# Patient Record
Sex: FEMALE | Race: WHITE | NOT HISPANIC OR LATINO | URBAN - METROPOLITAN AREA
[De-identification: names, ages, dates, MRNs, and addresses within clinical notes are randomized per-mention and may not be internally consistent; named-entity substitution may affect disease eponyms.]

---

## 2017-05-05 ENCOUNTER — EMERGENCY (EMERGENCY)
Facility: HOSPITAL | Age: 72
LOS: 1 days | Discharge: HOME | End: 2017-05-05
Attending: INTERNAL MEDICINE | Admitting: INTERNAL MEDICINE
Payer: MEDICARE

## 2017-05-05 VITALS
SYSTOLIC BLOOD PRESSURE: 161 MMHG | DIASTOLIC BLOOD PRESSURE: 93 MMHG | HEART RATE: 95 BPM | OXYGEN SATURATION: 96 % | WEIGHT: 255.96 LBS | HEIGHT: 64 IN | TEMPERATURE: 98 F

## 2017-05-05 VITALS
HEART RATE: 88 BPM | SYSTOLIC BLOOD PRESSURE: 154 MMHG | OXYGEN SATURATION: 96 % | DIASTOLIC BLOOD PRESSURE: 86 MMHG | RESPIRATION RATE: 16 BRPM

## 2017-05-05 DIAGNOSIS — Z90.49 ACQUIRED ABSENCE OF OTHER SPECIFIED PARTS OF DIGESTIVE TRACT: Chronic | ICD-10-CM

## 2017-05-05 DIAGNOSIS — Z90.710 ACQUIRED ABSENCE OF BOTH CERVIX AND UTERUS: Chronic | ICD-10-CM

## 2017-05-05 PROCEDURE — 99283 EMERGENCY DEPT VISIT LOW MDM: CPT | Mod: 25

## 2017-05-05 PROCEDURE — 73650 X-RAY EXAM OF HEEL: CPT

## 2017-05-05 PROCEDURE — 73650 X-RAY EXAM OF HEEL: CPT | Mod: 26,LT

## 2017-05-05 PROCEDURE — 99283 EMERGENCY DEPT VISIT LOW MDM: CPT

## 2017-05-05 RX ORDER — CEFOXITIN 1 G/1
1 INJECTION, POWDER, FOR SOLUTION INTRAVENOUS
Qty: 20 | Refills: 0
Start: 2017-05-05 | End: 2017-05-15

## 2017-05-05 NOTE — ED PROVIDER NOTE - MEDICAL DECISION MAKING DETAILS
erythema,tenderness,mild swelling( 4x2 inch area) overlying achilles insertion site(achilles not overtly involved)..will rx for cellulitis..no need presently to admit for iv ab

## 2017-05-05 NOTE — ED PROVIDER NOTE - OBJECTIVE STATEMENT
told to go to er for iv dose of ab."cellulitis"( overlying left achilles tendon insertion site)began 4 days ago.had fever and pain with ambulation.no chills,non diabetic.pain is actually better and no fever since onset of problem.

## 2017-05-05 NOTE — ED ADULT NURSE NOTE - OBJECTIVE STATEMENT
Noted mild swelling to lt lat ankle on Tues. Developed redness & spread to heel .. Advised by private MD to go to ED for antibiotics.

## 2017-11-21 ENCOUNTER — APPOINTMENT (OUTPATIENT)
Dept: INTERVENTIONAL RADIOLOGY/VASCULAR | Facility: CLINIC | Age: 72
End: 2017-11-21
Payer: MEDICARE

## 2017-11-21 VITALS
SYSTOLIC BLOOD PRESSURE: 116 MMHG | WEIGHT: 242 LBS | DIASTOLIC BLOOD PRESSURE: 74 MMHG | HEART RATE: 109 BPM | TEMPERATURE: 98 F | HEIGHT: 64.5 IN | BODY MASS INDEX: 40.81 KG/M2 | OXYGEN SATURATION: 96 %

## 2017-11-21 DIAGNOSIS — G89.29 LUMBAGO WITH SCIATICA, LEFT SIDE: ICD-10-CM

## 2017-11-21 DIAGNOSIS — Z60.2 PROBLEMS RELATED TO LIVING ALONE: ICD-10-CM

## 2017-11-21 DIAGNOSIS — M54.42 LUMBAGO WITH SCIATICA, LEFT SIDE: ICD-10-CM

## 2017-11-21 DIAGNOSIS — Z80.8 FAMILY HISTORY OF MALIGNANT NEOPLASM OF OTHER ORGANS OR SYSTEMS: ICD-10-CM

## 2017-11-21 DIAGNOSIS — Z87.39 PERSONAL HISTORY OF OTHER DISEASES OF THE MUSCULOSKELETAL SYSTEM AND CONNECTIVE TISSUE: ICD-10-CM

## 2017-11-21 DIAGNOSIS — Z78.9 OTHER SPECIFIED HEALTH STATUS: ICD-10-CM

## 2017-11-21 DIAGNOSIS — Z63.4 DISAPPEARANCE AND DEATH OF FAMILY MEMBER: ICD-10-CM

## 2017-11-21 DIAGNOSIS — Z85.41 PERSONAL HISTORY OF MALIGNANT NEOPLASM OF CERVIX UTERI: ICD-10-CM

## 2017-11-21 DIAGNOSIS — R93.5 ABNORMAL FINDINGS ON DIAGNOSTIC IMAGING OF OTHER ABDOMINAL REGIONS, INCLUDING RETROPERITONEUM: ICD-10-CM

## 2017-11-21 DIAGNOSIS — F15.90 OTHER STIMULANT USE, UNSPECIFIED, UNCOMPLICATED: ICD-10-CM

## 2017-11-21 DIAGNOSIS — R31.29 OTHER MICROSCOPIC HEMATURIA: ICD-10-CM

## 2017-11-21 PROCEDURE — 99204 OFFICE O/P NEW MOD 45 MIN: CPT

## 2017-11-21 SDOH — SOCIAL STABILITY - SOCIAL INSECURITY: DISSAPEARANCE AND DEATH OF FAMILY MEMBER: Z63.4

## 2017-11-21 SDOH — SOCIAL STABILITY - SOCIAL INSECURITY: PROBLEMS RELATED TO LIVING ALONE: Z60.2

## 2017-11-30 ENCOUNTER — OUTPATIENT (OUTPATIENT)
Dept: OUTPATIENT SERVICES | Facility: HOSPITAL | Age: 72
LOS: 1 days | End: 2017-11-30
Payer: MEDICARE

## 2017-11-30 VITALS
OXYGEN SATURATION: 95 % | HEART RATE: 90 BPM | RESPIRATION RATE: 16 BRPM | WEIGHT: 240.97 LBS | SYSTOLIC BLOOD PRESSURE: 137 MMHG | TEMPERATURE: 98 F | DIASTOLIC BLOOD PRESSURE: 80 MMHG | HEIGHT: 64.5 IN

## 2017-11-30 DIAGNOSIS — Z90.49 ACQUIRED ABSENCE OF OTHER SPECIFIED PARTS OF DIGESTIVE TRACT: Chronic | ICD-10-CM

## 2017-11-30 DIAGNOSIS — Z01.818 ENCOUNTER FOR OTHER PREPROCEDURAL EXAMINATION: ICD-10-CM

## 2017-11-30 DIAGNOSIS — N20.0 CALCULUS OF KIDNEY: ICD-10-CM

## 2017-11-30 DIAGNOSIS — Z98.890 OTHER SPECIFIED POSTPROCEDURAL STATES: Chronic | ICD-10-CM

## 2017-11-30 DIAGNOSIS — N81.4 UTEROVAGINAL PROLAPSE, UNSPECIFIED: Chronic | ICD-10-CM

## 2017-11-30 DIAGNOSIS — G47.33 OBSTRUCTIVE SLEEP APNEA (ADULT) (PEDIATRIC): ICD-10-CM

## 2017-11-30 DIAGNOSIS — I10 ESSENTIAL (PRIMARY) HYPERTENSION: ICD-10-CM

## 2017-11-30 DIAGNOSIS — Z90.710 ACQUIRED ABSENCE OF BOTH CERVIX AND UTERUS: Chronic | ICD-10-CM

## 2017-11-30 DIAGNOSIS — Z98.49 CATARACT EXTRACTION STATUS, UNSPECIFIED EYE: Chronic | ICD-10-CM

## 2017-11-30 LAB
ANION GAP SERPL CALC-SCNC: 12 MMOL/L — SIGNIFICANT CHANGE UP (ref 5–17)
BLD GP AB SCN SERPL QL: NEGATIVE — SIGNIFICANT CHANGE UP
BUN SERPL-MCNC: 16 MG/DL — SIGNIFICANT CHANGE UP (ref 7–23)
CALCIUM SERPL-MCNC: 10.1 MG/DL — SIGNIFICANT CHANGE UP (ref 8.4–10.5)
CHLORIDE SERPL-SCNC: 101 MMOL/L — SIGNIFICANT CHANGE UP (ref 96–108)
CO2 SERPL-SCNC: 27 MMOL/L — SIGNIFICANT CHANGE UP (ref 22–31)
CREAT SERPL-MCNC: 0.86 MG/DL — SIGNIFICANT CHANGE UP (ref 0.5–1.3)
GLUCOSE SERPL-MCNC: 88 MG/DL — SIGNIFICANT CHANGE UP (ref 70–99)
HCT VFR BLD CALC: 41.9 % — SIGNIFICANT CHANGE UP (ref 34.5–45)
HGB BLD-MCNC: 13.7 G/DL — SIGNIFICANT CHANGE UP (ref 11.5–15.5)
MCHC RBC-ENTMCNC: 31.6 PG — SIGNIFICANT CHANGE UP (ref 27–34)
MCHC RBC-ENTMCNC: 32.7 GM/DL — SIGNIFICANT CHANGE UP (ref 32–36)
MCV RBC AUTO: 96.8 FL — SIGNIFICANT CHANGE UP (ref 80–100)
PLATELET # BLD AUTO: 343 K/UL — SIGNIFICANT CHANGE UP (ref 150–400)
POTASSIUM SERPL-MCNC: 4.5 MMOL/L — SIGNIFICANT CHANGE UP (ref 3.5–5.3)
POTASSIUM SERPL-SCNC: 4.5 MMOL/L — SIGNIFICANT CHANGE UP (ref 3.5–5.3)
RBC # BLD: 4.33 M/UL — SIGNIFICANT CHANGE UP (ref 3.8–5.2)
RBC # FLD: 13.8 % — SIGNIFICANT CHANGE UP (ref 10.3–14.5)
RH IG SCN BLD-IMP: POSITIVE — SIGNIFICANT CHANGE UP
SODIUM SERPL-SCNC: 140 MMOL/L — SIGNIFICANT CHANGE UP (ref 135–145)
WBC # BLD: 7.88 K/UL — SIGNIFICANT CHANGE UP (ref 3.8–10.5)
WBC # FLD AUTO: 7.88 K/UL — SIGNIFICANT CHANGE UP (ref 3.8–10.5)

## 2017-11-30 PROCEDURE — 93005 ELECTROCARDIOGRAM TRACING: CPT

## 2017-11-30 PROCEDURE — 93010 ELECTROCARDIOGRAM REPORT: CPT

## 2017-11-30 PROCEDURE — 86900 BLOOD TYPING SEROLOGIC ABO: CPT

## 2017-11-30 PROCEDURE — 87086 URINE CULTURE/COLONY COUNT: CPT

## 2017-11-30 PROCEDURE — 86901 BLOOD TYPING SEROLOGIC RH(D): CPT

## 2017-11-30 PROCEDURE — G0463: CPT

## 2017-11-30 PROCEDURE — 85027 COMPLETE CBC AUTOMATED: CPT

## 2017-11-30 PROCEDURE — 86850 RBC ANTIBODY SCREEN: CPT

## 2017-11-30 PROCEDURE — 80048 BASIC METABOLIC PNL TOTAL CA: CPT

## 2017-11-30 RX ORDER — VALSARTAN 80 MG/1
1 TABLET ORAL
Qty: 0 | Refills: 0 | COMMUNITY

## 2017-11-30 RX ORDER — SODIUM CHLORIDE 9 MG/ML
3 INJECTION INTRAMUSCULAR; INTRAVENOUS; SUBCUTANEOUS EVERY 8 HOURS
Qty: 0 | Refills: 0 | Status: DISCONTINUED | OUTPATIENT
Start: 2017-12-07 | End: 2017-12-10

## 2017-11-30 NOTE — H&P PST ADULT - HISTORY OF PRESENT ILLNESS
73 yo female, PMH HTN, 71 yo female, PMH HTN, smoker 1/2 PPD X 55 years, emphysema, morbid obesity BMI 41, cervical CA s/p ROBERTO 1978, chronic low back pain since 2008 after a fall and lumbar spinal stenosis, bilateral renal stones s/p lithotripsy X 3. Pt. reports having microscopic blood in urine and CT scan revealed bilateral kidney stones, large stone on right and presents to PST today for Right Percutaneous Cystoscopy, Nephrolithotomy on 12/7/17. Pt. with NAVARRO which is chronic due to weight and emphysema, denies recent fever, chills, chest pain, changes in bowel/urinary habits.

## 2017-11-30 NOTE — H&P PST ADULT - PSH
H/O abdominal hysterectomy  ROBERTO 1978  H/O lithotripsy  ESWL 1986 left, 2004 right, 2007 left  History of appendectomy  1954  Prolapse, uterovaginal  s/p TVT Sling 2000  S/P breast lumpectomy  left, benign  S/P cataract surgery  bilateral, 2015  S/P cholecystectomy  2005  S/P ERCP  2006

## 2017-11-30 NOTE — H&P PST ADULT - PROBLEM SELECTOR PLAN 1
Right Right Percutaneous cystoscopy, Nephrolithotomy  Pre-op education, including Chlorhexidine soap, provided - all questions answered

## 2017-11-30 NOTE — H&P PST ADULT - PMH
Emphysema lung  mild, on CT scan 2017  HTN (hypertension)    Malignant neoplasm of cervix, unspecified site  1978  Obesity    Proteinuria    Renal calculus, bilateral    Smoker  1/2 pack X 55 years Emphysema lung  mild, on CT scan 2017  HTN (hypertension)    Malignant neoplasm of cervix, unspecified site  1978  Morbid obesity with BMI of 40.0-44.9, adult    Proteinuria    Renal calculus, bilateral    Smoker  1/2 pack X 55 years Emphysema lung  mild, on CT scan 2017  HTN (hypertension)  diagnosed 10/2017  Lumbar herniated disc    Malignant neoplasm of cervix, unspecified site  s/p ROBERTO 1978  Morbid obesity with BMI of 40.0-44.9, adult    TYLOR (obstructive sleep apnea)  as per [facility] criteria  Proteinuria    Renal calculus, bilateral    Smoker  1/2 pack X 55 years  Spinal stenosis of lumbar region

## 2017-11-30 NOTE — H&P PST ADULT - GENERAL GENITOURINARY SYMPTOMS
increased urinary frequency/hematuria/nocturia nocturia/microscopic hematuria/increased urinary frequency

## 2017-11-30 NOTE — H&P PST ADULT - NSANTHOSAYNRD_GEN_A_CORE
No. TYLOR screening performed.  STOP BANG Legend: 0-2 = LOW Risk; 3-4 = INTERMEDIATE Risk; 5-8 = HIGH Risk

## 2017-12-01 LAB
CULTURE RESULTS: SIGNIFICANT CHANGE UP
SPECIMEN SOURCE: SIGNIFICANT CHANGE UP

## 2017-12-07 ENCOUNTER — TRANSCRIPTION ENCOUNTER (OUTPATIENT)
Age: 72
End: 2017-12-07

## 2017-12-07 ENCOUNTER — INPATIENT (INPATIENT)
Facility: HOSPITAL | Age: 72
LOS: 2 days | Discharge: HOME | DRG: 660 | End: 2017-12-10
Attending: UROLOGY | Admitting: UROLOGY
Payer: MEDICARE

## 2017-12-07 ENCOUNTER — RESULT REVIEW (OUTPATIENT)
Age: 72
End: 2017-12-07

## 2017-12-07 VITALS
SYSTOLIC BLOOD PRESSURE: 104 MMHG | HEART RATE: 60 BPM | DIASTOLIC BLOOD PRESSURE: 53 MMHG | RESPIRATION RATE: 14 BRPM | TEMPERATURE: 97 F | OXYGEN SATURATION: 94 %

## 2017-12-07 DIAGNOSIS — Z98.890 OTHER SPECIFIED POSTPROCEDURAL STATES: Chronic | ICD-10-CM

## 2017-12-07 DIAGNOSIS — Z98.49 CATARACT EXTRACTION STATUS, UNSPECIFIED EYE: Chronic | ICD-10-CM

## 2017-12-07 DIAGNOSIS — N23 UNSPECIFIED RENAL COLIC: ICD-10-CM

## 2017-12-07 DIAGNOSIS — Z90.49 ACQUIRED ABSENCE OF OTHER SPECIFIED PARTS OF DIGESTIVE TRACT: Chronic | ICD-10-CM

## 2017-12-07 DIAGNOSIS — N20.0 CALCULUS OF KIDNEY: ICD-10-CM

## 2017-12-07 DIAGNOSIS — Z90.710 ACQUIRED ABSENCE OF BOTH CERVIX AND UTERUS: Chronic | ICD-10-CM

## 2017-12-07 DIAGNOSIS — N81.4 UTEROVAGINAL PROLAPSE, UNSPECIFIED: Chronic | ICD-10-CM

## 2017-12-07 LAB
ANION GAP SERPL CALC-SCNC: 12 MMOL/L — SIGNIFICANT CHANGE UP (ref 5–17)
APTT BLD: 31.6 SEC — SIGNIFICANT CHANGE UP (ref 27.5–37.4)
BASOPHILS # BLD AUTO: 0 K/UL — SIGNIFICANT CHANGE UP (ref 0–0.2)
BASOPHILS NFR BLD AUTO: 0.1 % — SIGNIFICANT CHANGE UP (ref 0–2)
BUN SERPL-MCNC: 20 MG/DL — SIGNIFICANT CHANGE UP (ref 7–23)
CALCIUM SERPL-MCNC: 7.8 MG/DL — LOW (ref 8.4–10.5)
CHLORIDE SERPL-SCNC: 104 MMOL/L — SIGNIFICANT CHANGE UP (ref 96–108)
CO2 SERPL-SCNC: 23 MMOL/L — SIGNIFICANT CHANGE UP (ref 22–31)
CREAT SERPL-MCNC: 0.86 MG/DL — SIGNIFICANT CHANGE UP (ref 0.5–1.3)
EOSINOPHIL # BLD AUTO: 0.1 K/UL — SIGNIFICANT CHANGE UP (ref 0–0.5)
EOSINOPHIL NFR BLD AUTO: 0.5 % — SIGNIFICANT CHANGE UP (ref 0–6)
GLUCOSE SERPL-MCNC: 171 MG/DL — HIGH (ref 70–99)
HCT VFR BLD CALC: 27 % — LOW (ref 34.5–45)
HCT VFR BLD CALC: 30.6 % — LOW (ref 34.5–45)
HGB BLD-MCNC: 10.7 G/DL — LOW (ref 11.5–15.5)
HGB BLD-MCNC: 9.7 G/DL — LOW (ref 11.5–15.5)
INR BLD: 1.01 RATIO — SIGNIFICANT CHANGE UP (ref 0.88–1.16)
LYMPHOCYTES # BLD AUTO: 16 % — SIGNIFICANT CHANGE UP (ref 13–44)
LYMPHOCYTES # BLD AUTO: 2.3 K/UL — SIGNIFICANT CHANGE UP (ref 1–3.3)
MCHC RBC-ENTMCNC: 34.6 PG — HIGH (ref 27–34)
MCHC RBC-ENTMCNC: 35 GM/DL — SIGNIFICANT CHANGE UP (ref 32–36)
MCHC RBC-ENTMCNC: 35.7 PG — HIGH (ref 27–34)
MCHC RBC-ENTMCNC: 35.9 GM/DL — SIGNIFICANT CHANGE UP (ref 32–36)
MCV RBC AUTO: 99 FL — SIGNIFICANT CHANGE UP (ref 80–100)
MCV RBC AUTO: 99.4 FL — SIGNIFICANT CHANGE UP (ref 80–100)
MONOCYTES # BLD AUTO: 0.5 K/UL — SIGNIFICANT CHANGE UP (ref 0–0.9)
MONOCYTES NFR BLD AUTO: 3.2 % — SIGNIFICANT CHANGE UP (ref 2–14)
NEUTROPHILS # BLD AUTO: 11.5 K/UL — HIGH (ref 1.8–7.4)
NEUTROPHILS NFR BLD AUTO: 80.2 % — HIGH (ref 43–77)
PLATELET # BLD AUTO: 221 K/UL — SIGNIFICANT CHANGE UP (ref 150–400)
PLATELET # BLD AUTO: 281 K/UL — SIGNIFICANT CHANGE UP (ref 150–400)
POTASSIUM SERPL-MCNC: 4.1 MMOL/L — SIGNIFICANT CHANGE UP (ref 3.5–5.3)
POTASSIUM SERPL-SCNC: 4.1 MMOL/L — SIGNIFICANT CHANGE UP (ref 3.5–5.3)
PROTHROM AB SERPL-ACNC: 10.9 SEC — SIGNIFICANT CHANGE UP (ref 9.8–12.7)
RBC # BLD: 2.72 M/UL — LOW (ref 3.8–5.2)
RBC # BLD: 3.09 M/UL — LOW (ref 3.8–5.2)
RBC # FLD: 12.3 % — SIGNIFICANT CHANGE UP (ref 10.3–14.5)
RBC # FLD: 12.4 % — SIGNIFICANT CHANGE UP (ref 10.3–14.5)
SODIUM SERPL-SCNC: 139 MMOL/L — SIGNIFICANT CHANGE UP (ref 135–145)
WBC # BLD: 11.3 K/UL — HIGH (ref 3.8–10.5)
WBC # BLD: 14.3 K/UL — HIGH (ref 3.8–10.5)
WBC # FLD AUTO: 11.3 K/UL — HIGH (ref 3.8–10.5)
WBC # FLD AUTO: 14.3 K/UL — HIGH (ref 3.8–10.5)

## 2017-12-07 PROCEDURE — 88300 SURGICAL PATH GROSS: CPT | Mod: 26

## 2017-12-07 PROCEDURE — 50395: CPT | Mod: RT

## 2017-12-07 RX ORDER — CEFAZOLIN SODIUM 1 G
2000 VIAL (EA) INJECTION ONCE
Qty: 0 | Refills: 0 | Status: COMPLETED | OUTPATIENT
Start: 2017-12-07 | End: 2017-12-07

## 2017-12-07 RX ORDER — SENNA PLUS 8.6 MG/1
2 TABLET ORAL AT BEDTIME
Qty: 0 | Refills: 0 | Status: DISCONTINUED | OUTPATIENT
Start: 2017-12-07 | End: 2017-12-07

## 2017-12-07 RX ORDER — VALSARTAN 80 MG/1
320 TABLET ORAL DAILY
Qty: 0 | Refills: 0 | Status: DISCONTINUED | OUTPATIENT
Start: 2017-12-07 | End: 2017-12-10

## 2017-12-07 RX ORDER — ONDANSETRON 8 MG/1
4 TABLET, FILM COATED ORAL ONCE
Qty: 0 | Refills: 0 | Status: COMPLETED | OUTPATIENT
Start: 2017-12-07 | End: 2017-12-08

## 2017-12-07 RX ORDER — HEPARIN SODIUM 5000 [USP'U]/ML
5000 INJECTION INTRAVENOUS; SUBCUTANEOUS EVERY 8 HOURS
Qty: 0 | Refills: 0 | Status: DISCONTINUED | OUTPATIENT
Start: 2017-12-07 | End: 2017-12-10

## 2017-12-07 RX ORDER — OXYCODONE AND ACETAMINOPHEN 5; 325 MG/1; MG/1
2 TABLET ORAL EVERY 4 HOURS
Qty: 0 | Refills: 0 | Status: DISCONTINUED | OUTPATIENT
Start: 2017-12-07 | End: 2017-12-10

## 2017-12-07 RX ORDER — LIDOCAINE HCL 20 MG/ML
0.2 VIAL (ML) INJECTION ONCE
Qty: 0 | Refills: 0 | Status: COMPLETED | OUTPATIENT
Start: 2017-12-07 | End: 2017-12-07

## 2017-12-07 RX ORDER — HYDROMORPHONE HYDROCHLORIDE 2 MG/ML
0.25 INJECTION INTRAMUSCULAR; INTRAVENOUS; SUBCUTANEOUS ONCE
Qty: 0 | Refills: 0 | Status: DISCONTINUED | OUTPATIENT
Start: 2017-12-07 | End: 2017-12-07

## 2017-12-07 RX ORDER — CEFAZOLIN SODIUM 1 G
2000 VIAL (EA) INJECTION EVERY 8 HOURS
Qty: 0 | Refills: 0 | Status: COMPLETED | OUTPATIENT
Start: 2017-12-07 | End: 2017-12-08

## 2017-12-07 RX ORDER — HYDROMORPHONE HYDROCHLORIDE 2 MG/ML
0.25 INJECTION INTRAMUSCULAR; INTRAVENOUS; SUBCUTANEOUS
Qty: 0 | Refills: 0 | Status: DISCONTINUED | OUTPATIENT
Start: 2017-12-07 | End: 2017-12-08

## 2017-12-07 RX ORDER — OXYCODONE AND ACETAMINOPHEN 5; 325 MG/1; MG/1
1 TABLET ORAL EVERY 4 HOURS
Qty: 0 | Refills: 0 | Status: DISCONTINUED | OUTPATIENT
Start: 2017-12-07 | End: 2017-12-10

## 2017-12-07 RX ORDER — SODIUM CHLORIDE 9 MG/ML
1000 INJECTION, SOLUTION INTRAVENOUS
Qty: 0 | Refills: 0 | Status: DISCONTINUED | OUTPATIENT
Start: 2017-12-07 | End: 2017-12-08

## 2017-12-07 RX ADMIN — Medication 100 MILLIGRAM(S): at 22:52

## 2017-12-07 RX ADMIN — HYDROMORPHONE HYDROCHLORIDE 0.25 MILLIGRAM(S): 2 INJECTION INTRAMUSCULAR; INTRAVENOUS; SUBCUTANEOUS at 20:05

## 2017-12-07 RX ADMIN — HYDROMORPHONE HYDROCHLORIDE 0.25 MILLIGRAM(S): 2 INJECTION INTRAMUSCULAR; INTRAVENOUS; SUBCUTANEOUS at 20:30

## 2017-12-07 RX ADMIN — SODIUM CHLORIDE 3 MILLILITER(S): 9 INJECTION INTRAMUSCULAR; INTRAVENOUS; SUBCUTANEOUS at 22:22

## 2017-12-07 RX ADMIN — HYDROMORPHONE HYDROCHLORIDE 0.25 MILLIGRAM(S): 2 INJECTION INTRAMUSCULAR; INTRAVENOUS; SUBCUTANEOUS at 13:00

## 2017-12-07 RX ADMIN — SODIUM CHLORIDE 150 MILLILITER(S): 9 INJECTION, SOLUTION INTRAVENOUS at 19:44

## 2017-12-07 RX ADMIN — HYDROMORPHONE HYDROCHLORIDE 0.25 MILLIGRAM(S): 2 INJECTION INTRAMUSCULAR; INTRAVENOUS; SUBCUTANEOUS at 12:45

## 2017-12-07 RX ADMIN — HYDROMORPHONE HYDROCHLORIDE 0.25 MILLIGRAM(S): 2 INJECTION INTRAMUSCULAR; INTRAVENOUS; SUBCUTANEOUS at 19:42

## 2017-12-07 RX ADMIN — HEPARIN SODIUM 5000 UNIT(S): 5000 INJECTION INTRAVENOUS; SUBCUTANEOUS at 22:53

## 2017-12-07 RX ADMIN — HYDROMORPHONE HYDROCHLORIDE 0.25 MILLIGRAM(S): 2 INJECTION INTRAMUSCULAR; INTRAVENOUS; SUBCUTANEOUS at 20:45

## 2017-12-07 NOTE — BRIEF OPERATIVE NOTE - PROCEDURE
<<-----Click on this checkbox to enter Procedure Percutaneous nephrolithotomy  12/07/2017    Active  CTABIB

## 2017-12-07 NOTE — PROGRESS NOTE ADULT - SUBJECTIVE AND OBJECTIVE BOX
Post op Check    Pt seen and examined without complaints. Pain is controlled. Denies SOB/CP/N/V.     Vital Signs Last 24 Hrs  T(C): 36.2 (07 Dec 2017 19:30), Max: 36.2 (07 Dec 2017 19:30)  T(F): 97.2 (07 Dec 2017 19:30), Max: 97.2 (07 Dec 2017 19:30)  HR: 78 (07 Dec 2017 20:30) (54 - 89)  BP: 114/62 (07 Dec 2017 20:30) (81/50 - 119/58)  BP(mean): 80 (07 Dec 2017 20:30) (57 - 83)  RR: 17 (07 Dec 2017 20:30) (14 - 17)  SpO2: 100% (07 Dec 2017 20:30) (94% - 100%)    I&O's Summary    07 Dec 2017 07:01  -  07 Dec 2017 20:58  --------------------------------------------------------  IN: 300 mL / OUT: 210 mL / NET: 90 mL    Physical Exam  Gen: NAD, A&Ox3  Pulm: No respiratory distress, no subcostal retractions  CV: RRR, no JVD  Abd: obese, Soft, NT, ND  Back: right flank dressing c/d/i   r nephrostomy tube draining clear red urine  : donnelly draining pink urine                           9.7    11.3  )-----------( 221      ( 07 Dec 2017 20:14 )             27.0       12-07    139  |  104  |  20  ----------------------------<  171<H>  4.1   |  23  |  0.86    Ca    7.8<L>      07 Dec 2017 18:53        A/P: 72y Female s/p right PCNL   DVT prophylaxis/OOB  Incentive spirometry  Strict I&O's and monitor vitals closely   Analgesia and antiemetics as needed  Diet-regular   Pacu labs and AM labs  Post op Check    Pt seen and examined without complaints. Pain is controlled. Denies SOB/CP/N/V.     Vital Signs Last 24 Hrs  T(C): 36.2 (07 Dec 2017 19:30), Max: 36.2 (07 Dec 2017 19:30)  T(F): 97.2 (07 Dec 2017 19:30), Max: 97.2 (07 Dec 2017 19:30)  HR: 78 (07 Dec 2017 20:30) (54 - 89)  BP: 114/62 (07 Dec 2017 20:30) (81/50 - 119/58)  BP(mean): 80 (07 Dec 2017 20:30) (57 - 83)  RR: 17 (07 Dec 2017 20:30) (14 - 17)  SpO2: 100% (07 Dec 2017 20:30) (94% - 100%)    I&O's Summary    07 Dec 2017 07:01  -  07 Dec 2017 20:58  --------------------------------------------------------  IN: 300 mL / OUT: 210 mL / NET: 90 mL    Physical Exam  Gen: NAD, A&Ox3  Pulm: No respiratory distress, no subcostal retractions  CV: RRR, no JVD  Abd: obese, Soft, NT, ND  Back: right flank dressing c/d/i   r nephrostomy tube draining clear red urine  : donnelly draining pink urine                           9.7    11.3  )-----------( 221      ( 07 Dec 2017 20:14 )             27.0       12-07    139  |  104  |  20  ----------------------------<  171<H>  4.1   |  23  |  0.86    Ca    7.8<L>      07 Dec 2017 18:53        A/P: 72y Female s/p right PCNL   DVT prophylaxis/OOB  Incentive spirometry  Strict I&O's and monitor vitals closely   Analgesia and antiemetics as needed  Diet-regular   Pacu labs and AM labs  Nephrostogram tomorrow

## 2017-12-08 DIAGNOSIS — N20.0 CALCULUS OF KIDNEY: ICD-10-CM

## 2017-12-08 LAB
ANION GAP SERPL CALC-SCNC: 8 MMOL/L — SIGNIFICANT CHANGE UP (ref 5–17)
BASOPHILS # BLD AUTO: 0 K/UL — SIGNIFICANT CHANGE UP (ref 0–0.2)
BASOPHILS NFR BLD AUTO: 0.2 % — SIGNIFICANT CHANGE UP (ref 0–2)
BUN SERPL-MCNC: 19 MG/DL — SIGNIFICANT CHANGE UP (ref 7–23)
CALCIUM SERPL-MCNC: 7.7 MG/DL — LOW (ref 8.4–10.5)
CHLORIDE SERPL-SCNC: 103 MMOL/L — SIGNIFICANT CHANGE UP (ref 96–108)
CO2 SERPL-SCNC: 27 MMOL/L — SIGNIFICANT CHANGE UP (ref 22–31)
CREAT SERPL-MCNC: 0.86 MG/DL — SIGNIFICANT CHANGE UP (ref 0.5–1.3)
EOSINOPHIL # BLD AUTO: 0 K/UL — SIGNIFICANT CHANGE UP (ref 0–0.5)
EOSINOPHIL NFR BLD AUTO: 0 % — SIGNIFICANT CHANGE UP (ref 0–6)
GLUCOSE SERPL-MCNC: 132 MG/DL — HIGH (ref 70–99)
HCT VFR BLD CALC: 28.3 % — LOW (ref 34.5–45)
HCT VFR BLD CALC: 30.4 % — LOW (ref 34.5–45)
HGB BLD-MCNC: 10.3 G/DL — LOW (ref 11.5–15.5)
HGB BLD-MCNC: 9.8 G/DL — LOW (ref 11.5–15.5)
LYMPHOCYTES # BLD AUTO: 0.8 K/UL — LOW (ref 1–3.3)
LYMPHOCYTES # BLD AUTO: 7.9 % — LOW (ref 13–44)
MCHC RBC-ENTMCNC: 33.6 PG — SIGNIFICANT CHANGE UP (ref 27–34)
MCHC RBC-ENTMCNC: 33.8 GM/DL — SIGNIFICANT CHANGE UP (ref 32–36)
MCHC RBC-ENTMCNC: 34.6 PG — HIGH (ref 27–34)
MCHC RBC-ENTMCNC: 34.8 GM/DL — SIGNIFICANT CHANGE UP (ref 32–36)
MCV RBC AUTO: 99.4 FL — SIGNIFICANT CHANGE UP (ref 80–100)
MCV RBC AUTO: 99.5 FL — SIGNIFICANT CHANGE UP (ref 80–100)
MONOCYTES # BLD AUTO: 0.5 K/UL — SIGNIFICANT CHANGE UP (ref 0–0.9)
MONOCYTES NFR BLD AUTO: 4.4 % — SIGNIFICANT CHANGE UP (ref 2–14)
NEUTROPHILS # BLD AUTO: 8.9 K/UL — HIGH (ref 1.8–7.4)
NEUTROPHILS NFR BLD AUTO: 87.5 % — HIGH (ref 43–77)
PLATELET # BLD AUTO: 267 K/UL — SIGNIFICANT CHANGE UP (ref 150–400)
PLATELET # BLD AUTO: 269 K/UL — SIGNIFICANT CHANGE UP (ref 150–400)
POTASSIUM SERPL-MCNC: 4.9 MMOL/L — SIGNIFICANT CHANGE UP (ref 3.5–5.3)
POTASSIUM SERPL-SCNC: 4.9 MMOL/L — SIGNIFICANT CHANGE UP (ref 3.5–5.3)
RBC # BLD: 2.85 M/UL — LOW (ref 3.8–5.2)
RBC # BLD: 3.05 M/UL — LOW (ref 3.8–5.2)
RBC # FLD: 12.5 % — SIGNIFICANT CHANGE UP (ref 10.3–14.5)
RBC # FLD: 12.5 % — SIGNIFICANT CHANGE UP (ref 10.3–14.5)
SODIUM SERPL-SCNC: 138 MMOL/L — SIGNIFICANT CHANGE UP (ref 135–145)
WBC # BLD: 10.1 K/UL — SIGNIFICANT CHANGE UP (ref 3.8–10.5)
WBC # BLD: 10.8 K/UL — HIGH (ref 3.8–10.5)
WBC # FLD AUTO: 10.1 K/UL — SIGNIFICANT CHANGE UP (ref 3.8–10.5)
WBC # FLD AUTO: 10.8 K/UL — HIGH (ref 3.8–10.5)

## 2017-12-08 PROCEDURE — 50431 NJX PX NFROSGRM &/URTRGRM: CPT | Mod: RT

## 2017-12-08 RX ORDER — ACETAMINOPHEN 500 MG
1000 TABLET ORAL ONCE
Qty: 0 | Refills: 0 | Status: COMPLETED | OUTPATIENT
Start: 2017-12-08 | End: 2017-12-08

## 2017-12-08 RX ORDER — INFLUENZA VIRUS VACCINE 15; 15; 15; 15 UG/.5ML; UG/.5ML; UG/.5ML; UG/.5ML
0.5 SUSPENSION INTRAMUSCULAR ONCE
Qty: 0 | Refills: 0 | Status: DISCONTINUED | OUTPATIENT
Start: 2017-12-08 | End: 2017-12-10

## 2017-12-08 RX ADMIN — HEPARIN SODIUM 5000 UNIT(S): 5000 INJECTION INTRAVENOUS; SUBCUTANEOUS at 21:19

## 2017-12-08 RX ADMIN — Medication 1000 MILLIGRAM(S): at 07:30

## 2017-12-08 RX ADMIN — Medication 100 MILLIGRAM(S): at 21:17

## 2017-12-08 RX ADMIN — SODIUM CHLORIDE 3 MILLILITER(S): 9 INJECTION INTRAMUSCULAR; INTRAVENOUS; SUBCUTANEOUS at 06:07

## 2017-12-08 RX ADMIN — Medication 100 MILLIGRAM(S): at 06:07

## 2017-12-08 RX ADMIN — Medication 400 MILLIGRAM(S): at 07:15

## 2017-12-08 RX ADMIN — Medication 100 MILLIGRAM(S): at 13:12

## 2017-12-08 RX ADMIN — Medication 30 MILLILITER(S): at 16:07

## 2017-12-08 RX ADMIN — HEPARIN SODIUM 5000 UNIT(S): 5000 INJECTION INTRAVENOUS; SUBCUTANEOUS at 13:12

## 2017-12-08 RX ADMIN — SODIUM CHLORIDE 3 MILLILITER(S): 9 INJECTION INTRAMUSCULAR; INTRAVENOUS; SUBCUTANEOUS at 13:13

## 2017-12-08 RX ADMIN — ONDANSETRON 4 MILLIGRAM(S): 8 TABLET, FILM COATED ORAL at 08:15

## 2017-12-08 RX ADMIN — SODIUM CHLORIDE 3 MILLILITER(S): 9 INJECTION INTRAMUSCULAR; INTRAVENOUS; SUBCUTANEOUS at 21:21

## 2017-12-08 RX ADMIN — HEPARIN SODIUM 5000 UNIT(S): 5000 INJECTION INTRAVENOUS; SUBCUTANEOUS at 06:07

## 2017-12-08 NOTE — PROGRESS NOTE ADULT - ASSESSMENT
A/P: 72y Female s/p right PCNL   -DVT prophylaxis/OOB  -Incentive spirometry  -F/U Nephrostogram   -OOB

## 2017-12-08 NOTE — PROGRESS NOTE ADULT - ATTENDING COMMENTS
patient stable but with decrease h/h  now stable and will monitor  for nephrostogram study today and possible removable saturday if no leak and no hematuria  advance diet   oob ambulate

## 2017-12-08 NOTE — PROGRESS NOTE ADULT - SUBJECTIVE AND OBJECTIVE BOX
UROLOGY DAILY PROGRESS NOTE:     Subjective: Patient seen and examined at bedside. No overnight events.       Objective:  Vital signs  T(F): , Max: 98.1 (12-08-17 @ 06:00)  HR: 85 (12-08-17 @ 07:30)  BP: 119/54 (12-08-17 @ 07:15)  SpO2: 94% (12-08-17 @ 07:30)  Wt(kg): --    I&O's Summary    07 Dec 2017 07:01  -  08 Dec 2017 07:00  --------------------------------------------------------  IN: 1875 mL / OUT: 1075 mL / NET: 800 mL        Gen: NAD  Pulm: No respiratory distress, no subcostal retractions  CV: RRR, no JVD  Abd: Soft, NT, ND  Back: right flank dressing c/d/i. R nephrostomy tube draining clear red urine  : [place] draining pink urine         Labs:  12-08  10.1  / 28.3  /0.86   12-08  10.8  / 30.4  /x                              9.8    10.1  )-----------( 269      ( 08 Dec 2017 04:34 )             28.3     12-08    138  |  103  |  19  ----------------------------<  132<H>  4.9   |  27  |  0.86    Ca    7.7<L>      08 Dec 2017 04:34      PT/INR - ( 07 Dec 2017 08:25 )   PT: 10.9 sec;   INR: 1.01 ratio         PTT - ( 07 Dec 2017 08:25 )  PTT:31.6 sec

## 2017-12-09 LAB
HCT VFR BLD CALC: 23.6 % — LOW (ref 34.5–45)
HCT VFR BLD CALC: 23.9 % — LOW (ref 34.5–45)
HGB BLD-MCNC: 8.1 G/DL — LOW (ref 11.5–15.5)
HGB BLD-MCNC: 8.3 G/DL — LOW (ref 11.5–15.5)
MCHC RBC-ENTMCNC: 34.2 GM/DL — SIGNIFICANT CHANGE UP (ref 32–36)
MCHC RBC-ENTMCNC: 34.2 PG — HIGH (ref 27–34)
MCHC RBC-ENTMCNC: 34.6 GM/DL — SIGNIFICANT CHANGE UP (ref 32–36)
MCHC RBC-ENTMCNC: 34.7 PG — HIGH (ref 27–34)
MCV RBC AUTO: 100 FL — SIGNIFICANT CHANGE UP (ref 80–100)
MCV RBC AUTO: 100 FL — SIGNIFICANT CHANGE UP (ref 80–100)
PLATELET # BLD AUTO: 233 K/UL — SIGNIFICANT CHANGE UP (ref 150–400)
PLATELET # BLD AUTO: 258 K/UL — SIGNIFICANT CHANGE UP (ref 150–400)
RBC # BLD: 2.36 M/UL — LOW (ref 3.8–5.2)
RBC # BLD: 2.38 M/UL — LOW (ref 3.8–5.2)
RBC # FLD: 12.6 % — SIGNIFICANT CHANGE UP (ref 10.3–14.5)
RBC # FLD: 12.6 % — SIGNIFICANT CHANGE UP (ref 10.3–14.5)
WBC # BLD: 11.3 K/UL — HIGH (ref 3.8–10.5)
WBC # BLD: 9.1 K/UL — SIGNIFICANT CHANGE UP (ref 3.8–10.5)
WBC # FLD AUTO: 11.3 K/UL — HIGH (ref 3.8–10.5)
WBC # FLD AUTO: 9.1 K/UL — SIGNIFICANT CHANGE UP (ref 3.8–10.5)

## 2017-12-09 RX ORDER — NICOTINE POLACRILEX 2 MG
1 GUM BUCCAL DAILY
Qty: 0 | Refills: 0 | Status: DISCONTINUED | OUTPATIENT
Start: 2017-12-09 | End: 2017-12-10

## 2017-12-09 RX ORDER — SODIUM CHLORIDE 9 MG/ML
1000 INJECTION INTRAMUSCULAR; INTRAVENOUS; SUBCUTANEOUS ONCE
Qty: 0 | Refills: 0 | Status: COMPLETED | OUTPATIENT
Start: 2017-12-09 | End: 2017-12-09

## 2017-12-09 RX ORDER — ACETAMINOPHEN 500 MG
1000 TABLET ORAL ONCE
Qty: 0 | Refills: 0 | Status: DISCONTINUED | OUTPATIENT
Start: 2017-12-09 | End: 2017-12-10

## 2017-12-09 RX ADMIN — SODIUM CHLORIDE 1000 MILLILITER(S): 9 INJECTION INTRAMUSCULAR; INTRAVENOUS; SUBCUTANEOUS at 11:59

## 2017-12-09 RX ADMIN — SODIUM CHLORIDE 3 MILLILITER(S): 9 INJECTION INTRAMUSCULAR; INTRAVENOUS; SUBCUTANEOUS at 05:56

## 2017-12-09 RX ADMIN — HEPARIN SODIUM 5000 UNIT(S): 5000 INJECTION INTRAVENOUS; SUBCUTANEOUS at 15:56

## 2017-12-09 RX ADMIN — OXYCODONE AND ACETAMINOPHEN 2 TABLET(S): 5; 325 TABLET ORAL at 13:00

## 2017-12-09 RX ADMIN — HEPARIN SODIUM 5000 UNIT(S): 5000 INJECTION INTRAVENOUS; SUBCUTANEOUS at 05:41

## 2017-12-09 RX ADMIN — SODIUM CHLORIDE 3 MILLILITER(S): 9 INJECTION INTRAMUSCULAR; INTRAVENOUS; SUBCUTANEOUS at 13:00

## 2017-12-09 RX ADMIN — OXYCODONE AND ACETAMINOPHEN 2 TABLET(S): 5; 325 TABLET ORAL at 12:04

## 2017-12-09 RX ADMIN — SODIUM CHLORIDE 3 MILLILITER(S): 9 INJECTION INTRAMUSCULAR; INTRAVENOUS; SUBCUTANEOUS at 21:29

## 2017-12-09 RX ADMIN — HEPARIN SODIUM 5000 UNIT(S): 5000 INJECTION INTRAVENOUS; SUBCUTANEOUS at 21:25

## 2017-12-09 RX ADMIN — VALSARTAN 320 MILLIGRAM(S): 80 TABLET ORAL at 05:58

## 2017-12-09 NOTE — PROVIDER CONTACT NOTE (OTHER) - ASSESSMENT
R hand IV infiltration; Site ecchymotic & +1 edema noted. No numbness, tingling, loss of sensation. Pulses palpable +2, capillary refill

## 2017-12-09 NOTE — PROGRESS NOTE ADULT - SUBJECTIVE AND OBJECTIVE BOX
UROLOGY DAILY PROGRESS NOTE:     Subjective: Patient seen and examined at bedside. No overnight events.       Objective:  Vital signs  T(F): , Max: 99 (12-08-17 @ 20:20)  HR: 98 (12-09-17 @ 05:10)  BP: 135/77 (12-09-17 @ 05:10)  SpO2: 94% (12-09-17 @ 05:10)  Wt(kg): --    I&O's Summary    08 Dec 2017 07:01  -  09 Dec 2017 07:00  --------------------------------------------------------  IN: 1900 mL / OUT: 2135 mL / NET: -235 mL    Gen: NAD  Pulm: No respiratory distress, no subcostal retractions  CV: RRR, no JVD  Abd: Soft, NT, ND  Back: R NT dressing c/d/i, draining clear red urine  : donnelly draining clear red urine    Labs:  12-09  9.1   / 23.9  /x      12-08  10.1  / 28.3  /0.86                           8.3    9.1   )-----------( 233      ( 09 Dec 2017 07:07 )             23.9     12-08    138  |  103  |  19  ----------------------------<  132<H>  4.9   |  27  |  0.86    Ca    7.7<L>      08 Dec 2017 04:34

## 2017-12-09 NOTE — DIETITIAN INITIAL EVALUATION ADULT. - ORAL INTAKE PTA
Pt reports consuming 2-3meals/day + snacks. Breakfast: coffee (made with hot chocolate and cream), cream of wheat/oatmeal/cinnamon toast/cold cereal, eggs. Lunch: skips or salad. Dinner: meat with sauce, vegetable and starch. Snacks: cookies, cakes. Pt stated she changed the way she eats prior to surgery to try to loose weight. Pt denies micronutrient supplementation. Pt confirms NKFA./good

## 2017-12-09 NOTE — CHART NOTE - NSCHARTNOTEFT_GEN_A_CORE
Upon Nutritional Assessment by the Registered Dietitian your patient was determined to meet criteria / has evidence of the following diagnosis/diagnoses:          [ ]  Mild Protein Calorie Malnutrition        [ ]  Moderate Protein Calorie Malnutrition        [ ] Severe Protein Calorie Malnutrition        [ ] Unspecified Protein Calorie Malnutrition        [ ] Underweight / BMI <19        [X] Morbid Obesity / BMI > 40      Findings as based on:  [X] Comprehensive nutrition assessment   [ ] Nutrition Focused Physical Exam  [X] Other: BMI:40.6kg/m2, 196% of ideal body weight      Nutrition Plan/Recommendations:    - Change to Low Sodium diet  - Provided and reviewed low sodium diet handout. Reviewed foods high in Na to avoid. Reviewed ways to decrease Na in your diet, discussed meal and snack options. Discussed myplate method and portion sizes.    RD remains available Namrata Weiss MBA RDN CDN Pager [phone]     PROVIDER Section:     By signing this assessment you are acknowledging and agree with the diagnosis/diagnoses assigned by the Registered Dietitian    Comments:

## 2017-12-09 NOTE — DIETITIAN INITIAL EVALUATION ADULT. - ADHERENCE
fair/Pt endorses limiting salt intake since dx of HTN although unaware of Himalayan sea salt as a source of sodium.

## 2017-12-09 NOTE — DIETITIAN INITIAL EVALUATION ADULT. - ENERGY NEEDS
ht.64.6inches wt.240.9pounds BMI:40.6kg/m2 IBW:123pounds(+/-10%) %IBW:196%  Pt is 71 yo F with PMH of HTN, smoker, emphysema, morbid obesity, cervical Ca S/P ROBERTO 1978, chronic LBP and spinal stenosis, B/L renal stones S/P lithotripsy now S/P R PCNL POD#2 pending CBC, plan to discharge NT this afternoon.   No Edema noted  No Pressure Ulcers noted  Namrata Weiss MBA RDN CDN Pager [phone]

## 2017-12-09 NOTE — DIETITIAN INITIAL EVALUATION ADULT. - OTHER INFO
Pt seen for BMI >40kg/m2 consult. Pt reports UBW of 253 pounds with recent intentional 13pound weight loss [id number] prior to surgery. Pt denies any N/V, constipation or diarrhea. Per chart, pt's last BM 12/6. Pt denies any chewing/swallowing difficulties.

## 2017-12-09 NOTE — DIETITIAN INITIAL EVALUATION ADULT. - NS AS NUTRI INTERV ED CONTENT
Provided and reviewed low sodium diet handout. Reviewed foods high in Na to avoid. Reviewed ways to decrease Na in your diet, discussed meal and snack options. Discussed myplate method and portion sizes

## 2017-12-09 NOTE — PROGRESS NOTE ADULT - SUBJECTIVE AND OBJECTIVE BOX
Subjective  oob with slight dizziness and mild flank pain    Objective  urine with minimal hematuria    Vital signs  T(F): , Max: 99.2 (12-09-17 @ 09:21)  HR: 102 (12-09-17 @ 11:09)  BP: 92/58 (12-09-17 @ 11:09)  SpO2: 94% (12-09-17 @ 09:21)  Wt(kg): --    Output     12-08 @ 07:01  -  12-09 @ 07:00  --------------------------------------------------------  IN: 1900 mL / OUT: 2135 mL / NET: -235 mL    12-09 @ 07:01  -  12-09 @ 13:32  --------------------------------------------------------  IN: 320 mL / OUT: 0 mL / NET: 320 mL        Gen  awake and alert   Abd  soft  nt with min hematuria    as above    Labs      12-09 @ 13:09    WBC 11.3  / Hct 23.6  / SCr --       12-09 @ 07:07    WBC 9.1   / Hct 23.9  / SCr --

## 2017-12-09 NOTE — PROGRESS NOTE ADULT - ASSESSMENT
A/P: 72y Female s/p right PCNL, POD2  -DVT prophylaxis/OOB  -Incentive spirometry  -Trial of void  -Noon cbc  -D/c NT this afternoon pending cbc

## 2017-12-09 NOTE — PROGRESS NOTE ADULT - ASSESSMENT
stable post op but anemic with acute periop blood loss now stable    t and c x 2u  ? transfuse this pm  clamp nt and ? remove in am

## 2017-12-10 ENCOUNTER — TRANSCRIPTION ENCOUNTER (OUTPATIENT)
Age: 72
End: 2017-12-10

## 2017-12-10 VITALS
SYSTOLIC BLOOD PRESSURE: 134 MMHG | DIASTOLIC BLOOD PRESSURE: 62 MMHG | TEMPERATURE: 98 F | HEART RATE: 95 BPM | RESPIRATION RATE: 18 BRPM | OXYGEN SATURATION: 96 %

## 2017-12-10 LAB
HCT VFR BLD CALC: 26.5 % — LOW (ref 34.5–45)
HGB BLD-MCNC: 9 G/DL — LOW (ref 11.5–15.5)
MCHC RBC-ENTMCNC: 33.6 PG — SIGNIFICANT CHANGE UP (ref 27–34)
MCHC RBC-ENTMCNC: 33.9 GM/DL — SIGNIFICANT CHANGE UP (ref 32–36)
MCV RBC AUTO: 99 FL — SIGNIFICANT CHANGE UP (ref 80–100)
PLATELET # BLD AUTO: 234 K/UL — SIGNIFICANT CHANGE UP (ref 150–400)
RBC # BLD: 2.68 M/UL — LOW (ref 3.8–5.2)
RBC # FLD: 12.8 % — SIGNIFICANT CHANGE UP (ref 10.3–14.5)
WBC # BLD: 8 K/UL — SIGNIFICANT CHANGE UP (ref 3.8–10.5)
WBC # FLD AUTO: 8 K/UL — SIGNIFICANT CHANGE UP (ref 3.8–10.5)

## 2017-12-10 RX ORDER — ACETAMINOPHEN 500 MG
650 TABLET ORAL EVERY 6 HOURS
Qty: 0 | Refills: 0 | Status: DISCONTINUED | OUTPATIENT
Start: 2017-12-10 | End: 2017-12-10

## 2017-12-10 RX ORDER — VALSARTAN 80 MG/1
1 TABLET ORAL
Qty: 0 | Refills: 0 | DISCHARGE
Start: 2017-12-10

## 2017-12-10 RX ORDER — VALSARTAN 80 MG/1
1 TABLET ORAL
Qty: 0 | Refills: 0 | COMMUNITY

## 2017-12-10 RX ORDER — CEPHALEXIN 500 MG
1 CAPSULE ORAL
Qty: 21 | Refills: 0
Start: 2017-12-10 | End: 2017-12-16

## 2017-12-10 RX ADMIN — HEPARIN SODIUM 5000 UNIT(S): 5000 INJECTION INTRAVENOUS; SUBCUTANEOUS at 06:03

## 2017-12-10 RX ADMIN — VALSARTAN 320 MILLIGRAM(S): 80 TABLET ORAL at 06:03

## 2017-12-10 RX ADMIN — Medication 650 MILLIGRAM(S): at 09:21

## 2017-12-10 RX ADMIN — SODIUM CHLORIDE 3 MILLILITER(S): 9 INJECTION INTRAMUSCULAR; INTRAVENOUS; SUBCUTANEOUS at 13:04

## 2017-12-10 RX ADMIN — HEPARIN SODIUM 5000 UNIT(S): 5000 INJECTION INTRAVENOUS; SUBCUTANEOUS at 13:50

## 2017-12-10 RX ADMIN — Medication 650 MILLIGRAM(S): at 09:51

## 2017-12-10 NOTE — DISCHARGE NOTE ADULT - MEDICATION SUMMARY - MEDICATIONS TO TAKE
I will START or STAY ON the medications listed below when I get home from the hospital:    oxyCODONE-acetaminophen 5 mg-325 mg oral tablet  -- 1 tab(s) by mouth every 4 hours, As needed, Moderate Pain (4 - 6) MDD:8  -- Indication: For Pain medication     valsartan 320 mg oral tablet  -- 1 tab(s) by mouth once a day  -- Indication: For Blood pressure    cephalexin 500 mg oral tablet  -- 1 tab(s) by mouth 3 times a day   -- Finish all this medication unless otherwise directed by prescriber.    -- Indication: For Antibiotic

## 2017-12-10 NOTE — DISCHARGE NOTE ADULT - HOSPITAL COURSE
71y/o female with PMHx HTN, emphysema, cervical ca and kidney stones s/p right pcnl on 12/7/17. Patient tolerated procedure well. Gamboa was removed postop day 2, pt voided with minimal post void residual. Had a drop in hct of 24 and tachy so 1 unit of blood given and pt responded well. Nephrostogram performed showed stone burden resolved. Nephrostomy tube was removed Post op day #3. Dressing applied. Upon discharge pt remained afebrile, voiding, tolerating diet, and pain well managed. Pt will follow up with Dr. Goldberg next week. 71y/o female with PMHx HTN, emphysema, cervical ca and kidney stones s/p right perc nephrostomy tube placement by IR and right pcnl on 12/7/17. Patient tolerated procedure well. Gamboa was removed postop day 2, pt voided with minimal post void residual. Had a drop in hct of 24 and tachy so 1 unit of blood given and pt responded well. Nephrostogram performed showed stone burden resolved. Nephrostomy tube was removed Post op day #3. Dressing applied. Upon discharge pt remained afebrile, voiding, tolerating diet, and pain well managed. Pt will follow up with Dr. Goldberg next week.

## 2017-12-10 NOTE — DISCHARGE NOTE ADULT - PATIENT PORTAL LINK FT
“You can access the Follow[employer] Patient Portal, offered by [facility], by registering with the following website: [email or web address]”

## 2017-12-10 NOTE — PROGRESS NOTE ADULT - ASSESSMENT
stable post op  discharge home   instructions reviewed  will fu 2 weeks as outpatient re stent removal  feso4 for anemia unless symptomatic

## 2017-12-10 NOTE — DISCHARGE NOTE ADULT - SECONDARY DIAGNOSIS.
Hypertension, unspecified type TYLOR (obstructive sleep apnea) Pulmonary emphysema, unspecified emphysema type

## 2017-12-10 NOTE — DISCHARGE NOTE ADULT - PLAN OF CARE
Resolution of stone burden Call the office or go to the emergency room if you experience fever greater than 101, chills, pain not relieved by oral medication, inability to tolerate food or liquids or are unable to void. No heavy lifting greater than 10lbs, you may shower regularly but no baths, use stool softener to avoid straining and constipation. Change back dressing as often as needed.   Return to daily living activities slowly as tolerated. Maintain normal blood pressure Continue home regimen Baseline symptoms

## 2017-12-10 NOTE — DISCHARGE NOTE ADULT - CARE PROVIDER_API CALL
Goldberg, Gary D (MD), Urology  [address] [place] [address]  Phone: [phone]  Fax: [phone]

## 2017-12-10 NOTE — PROGRESS NOTE ADULT - SUBJECTIVE AND OBJECTIVE BOX
Subjective  no pain but urinary urgency  no dizziness lightheadedness    Objective  wound c/d/i  nt removed no leakage    Vital signs  T(F): , Max: 98.6 (12-10-17 @ 09:22)  HR: 89 (12-10-17 @ 09:22)  BP: 114/71 (12-10-17 @ 09:22)  SpO2: 95% (12-10-17 @ 09:22)  Wt(kg): --    Output     12-09 @ 07:01  -  12-10 @ 07:00  --------------------------------------------------------  IN: 2690 mL / OUT: 1100 mL / NET: 1590 mL        Gen  awake and alert  [facility]    Labs      12-10 @ 07:06    WBC 8.0   / Hct 26.5  / SCr --       12-09 @ 13:09    WBC 11.3  / Hct 23.6  / SCr --

## 2017-12-10 NOTE — DISCHARGE NOTE ADULT - CARE PLAN
Principal Discharge DX:	Renal calculus, bilateral  Goal:	Resolution of stone burden  Instructions for follow-up, activity and diet:	Call the office or go to the emergency room if you experience fever greater than 101, chills, pain not relieved by oral medication, inability to tolerate food or liquids or are unable to void. No heavy lifting greater than 10lbs, you may shower regularly but no baths, use stool softener to avoid straining and constipation. Change back dressing as often as needed.   Return to daily living activities slowly as tolerated.  Secondary Diagnosis:	Hypertension, unspecified type  Goal:	Maintain normal blood pressure  Instructions for follow-up, activity and diet:	Continue home regimen  Secondary Diagnosis:	TYLOR (obstructive sleep apnea)  Goal:	Baseline symptoms  Instructions for follow-up, activity and diet:	Continue home regimen  Secondary Diagnosis:	Pulmonary emphysema, unspecified emphysema type  Goal:	Baseline symptoms  Instructions for follow-up, activity and diet:	Continue home regimen

## 2017-12-10 NOTE — DISCHARGE NOTE ADULT - CARE PROVIDERS DIRECT ADDRESSES
[email or web address]

## 2017-12-13 LAB — COMPN STONE: SIGNIFICANT CHANGE UP

## 2017-12-18 LAB — SURGICAL PATHOLOGY STUDY: SIGNIFICANT CHANGE UP

## 2017-12-19 PROCEDURE — 36430 TRANSFUSION BLD/BLD COMPNT: CPT

## 2017-12-19 PROCEDURE — 80048 BASIC METABOLIC PNL TOTAL CA: CPT

## 2017-12-19 PROCEDURE — 88300 SURGICAL PATH GROSS: CPT

## 2017-12-19 PROCEDURE — 85610 PROTHROMBIN TIME: CPT

## 2017-12-19 PROCEDURE — 50395: CPT

## 2017-12-19 PROCEDURE — C1894: CPT

## 2017-12-19 PROCEDURE — C1758: CPT

## 2017-12-19 PROCEDURE — C2617: CPT

## 2017-12-19 PROCEDURE — 82365 CALCULUS SPECTROSCOPY: CPT

## 2017-12-19 PROCEDURE — 85730 THROMBOPLASTIN TIME PARTIAL: CPT

## 2017-12-19 PROCEDURE — C1769: CPT

## 2017-12-19 PROCEDURE — 86923 COMPATIBILITY TEST ELECTRIC: CPT

## 2017-12-19 PROCEDURE — C1726: CPT

## 2017-12-19 PROCEDURE — 85027 COMPLETE CBC AUTOMATED: CPT

## 2017-12-19 PROCEDURE — C1889: CPT

## 2017-12-19 PROCEDURE — 50431 NJX PX NFROSGRM &/URTRGRM: CPT

## 2017-12-19 PROCEDURE — P9016: CPT

## 2017-12-19 PROCEDURE — C1887: CPT

## 2017-12-19 PROCEDURE — 76000 FLUOROSCOPY <1 HR PHYS/QHP: CPT

## 2017-12-20 ENCOUNTER — OUTPATIENT (OUTPATIENT)
Dept: OUTPATIENT SERVICES | Facility: HOSPITAL | Age: 72
LOS: 1 days | End: 2017-12-20
Payer: MEDICARE

## 2017-12-20 ENCOUNTER — APPOINTMENT (OUTPATIENT)
Dept: RADIOLOGY | Facility: CLINIC | Age: 72
End: 2017-12-20

## 2017-12-20 DIAGNOSIS — N81.4 UTEROVAGINAL PROLAPSE, UNSPECIFIED: Chronic | ICD-10-CM

## 2017-12-20 DIAGNOSIS — Z90.49 ACQUIRED ABSENCE OF OTHER SPECIFIED PARTS OF DIGESTIVE TRACT: Chronic | ICD-10-CM

## 2017-12-20 DIAGNOSIS — Z00.8 ENCOUNTER FOR OTHER GENERAL EXAMINATION: ICD-10-CM

## 2017-12-20 DIAGNOSIS — Z90.710 ACQUIRED ABSENCE OF BOTH CERVIX AND UTERUS: Chronic | ICD-10-CM

## 2017-12-20 DIAGNOSIS — Z98.49 CATARACT EXTRACTION STATUS, UNSPECIFIED EYE: Chronic | ICD-10-CM

## 2017-12-20 DIAGNOSIS — Z98.890 OTHER SPECIFIED POSTPROCEDURAL STATES: Chronic | ICD-10-CM

## 2017-12-20 PROCEDURE — 74000: CPT | Mod: 26

## 2017-12-20 PROCEDURE — 74018 RADEX ABDOMEN 1 VIEW: CPT

## 2018-05-17 ENCOUNTER — APPOINTMENT (OUTPATIENT)
Dept: ULTRASOUND IMAGING | Facility: CLINIC | Age: 73
End: 2018-05-17
Payer: MEDICARE

## 2018-05-17 ENCOUNTER — OUTPATIENT (OUTPATIENT)
Dept: OUTPATIENT SERVICES | Facility: HOSPITAL | Age: 73
LOS: 1 days | End: 2018-05-17
Payer: MEDICARE

## 2018-05-17 DIAGNOSIS — Z90.49 ACQUIRED ABSENCE OF OTHER SPECIFIED PARTS OF DIGESTIVE TRACT: Chronic | ICD-10-CM

## 2018-05-17 DIAGNOSIS — Z98.890 OTHER SPECIFIED POSTPROCEDURAL STATES: Chronic | ICD-10-CM

## 2018-05-17 DIAGNOSIS — Z90.710 ACQUIRED ABSENCE OF BOTH CERVIX AND UTERUS: Chronic | ICD-10-CM

## 2018-05-17 DIAGNOSIS — Z00.8 ENCOUNTER FOR OTHER GENERAL EXAMINATION: ICD-10-CM

## 2018-05-17 DIAGNOSIS — N81.4 UTEROVAGINAL PROLAPSE, UNSPECIFIED: Chronic | ICD-10-CM

## 2018-05-17 DIAGNOSIS — Z98.49 CATARACT EXTRACTION STATUS, UNSPECIFIED EYE: Chronic | ICD-10-CM

## 2018-05-17 PROCEDURE — 76770 US EXAM ABDO BACK WALL COMP: CPT | Mod: 26

## 2018-05-17 PROCEDURE — 76770 US EXAM ABDO BACK WALL COMP: CPT

## 2018-06-06 ENCOUNTER — APPOINTMENT (OUTPATIENT)
Dept: RADIOLOGY | Facility: CLINIC | Age: 73
End: 2018-06-06
Payer: MEDICARE

## 2018-06-06 ENCOUNTER — OUTPATIENT (OUTPATIENT)
Dept: OUTPATIENT SERVICES | Facility: HOSPITAL | Age: 73
LOS: 1 days | End: 2018-06-06
Payer: MEDICARE

## 2018-06-06 DIAGNOSIS — Z98.890 OTHER SPECIFIED POSTPROCEDURAL STATES: Chronic | ICD-10-CM

## 2018-06-06 DIAGNOSIS — Z98.49 CATARACT EXTRACTION STATUS, UNSPECIFIED EYE: Chronic | ICD-10-CM

## 2018-06-06 DIAGNOSIS — Z90.49 ACQUIRED ABSENCE OF OTHER SPECIFIED PARTS OF DIGESTIVE TRACT: Chronic | ICD-10-CM

## 2018-06-06 DIAGNOSIS — Z90.710 ACQUIRED ABSENCE OF BOTH CERVIX AND UTERUS: Chronic | ICD-10-CM

## 2018-06-06 DIAGNOSIS — N81.4 UTEROVAGINAL PROLAPSE, UNSPECIFIED: Chronic | ICD-10-CM

## 2018-06-06 DIAGNOSIS — Z00.8 ENCOUNTER FOR OTHER GENERAL EXAMINATION: ICD-10-CM

## 2018-06-06 PROCEDURE — 74018 RADEX ABDOMEN 1 VIEW: CPT | Mod: 26

## 2018-06-06 PROCEDURE — 74018 RADEX ABDOMEN 1 VIEW: CPT

## 2018-06-20 ENCOUNTER — APPOINTMENT (OUTPATIENT)
Dept: INTERNAL MEDICINE | Facility: CLINIC | Age: 73
End: 2018-06-20
Payer: MEDICARE

## 2018-06-20 VITALS
HEIGHT: 62.5 IN | DIASTOLIC BLOOD PRESSURE: 76 MMHG | OXYGEN SATURATION: 96 % | BODY MASS INDEX: 46.29 KG/M2 | SYSTOLIC BLOOD PRESSURE: 152 MMHG | WEIGHT: 258 LBS | HEART RATE: 125 BPM

## 2018-06-20 PROCEDURE — G0009: CPT

## 2018-06-20 PROCEDURE — 99204 OFFICE O/P NEW MOD 45 MIN: CPT | Mod: 25

## 2018-06-20 PROCEDURE — 90670 PCV13 VACCINE IM: CPT

## 2018-06-20 PROCEDURE — 90715 TDAP VACCINE 7 YRS/> IM: CPT

## 2018-06-20 PROCEDURE — 90472 IMMUNIZATION ADMIN EACH ADD: CPT

## 2018-06-20 RX ORDER — PNV NO.95/FERROUS FUM/FOLIC AC 28MG-0.8MG
TABLET ORAL
Refills: 0 | Status: ACTIVE | COMMUNITY

## 2018-06-20 RX ORDER — FLUCONAZOLE 150 MG/1
150 TABLET ORAL
Qty: 1 | Refills: 0 | Status: DISCONTINUED | COMMUNITY
Start: 2018-02-08

## 2018-06-20 RX ORDER — BACILLUS COAGULANS/INULIN 1B-250 MG
CAPSULE ORAL
Refills: 0 | Status: ACTIVE | COMMUNITY

## 2018-06-21 ENCOUNTER — TRANSCRIPTION ENCOUNTER (OUTPATIENT)
Age: 73
End: 2018-06-21

## 2018-06-22 ENCOUNTER — FORM ENCOUNTER (OUTPATIENT)
Age: 73
End: 2018-06-22

## 2018-06-23 ENCOUNTER — OUTPATIENT (OUTPATIENT)
Dept: OUTPATIENT SERVICES | Facility: HOSPITAL | Age: 73
LOS: 1 days | End: 2018-06-23
Payer: MEDICARE

## 2018-06-23 ENCOUNTER — APPOINTMENT (OUTPATIENT)
Dept: ULTRASOUND IMAGING | Facility: CLINIC | Age: 73
End: 2018-06-23

## 2018-06-23 ENCOUNTER — RX RENEWAL (OUTPATIENT)
Age: 73
End: 2018-06-23

## 2018-06-23 DIAGNOSIS — Z00.8 ENCOUNTER FOR OTHER GENERAL EXAMINATION: ICD-10-CM

## 2018-06-23 DIAGNOSIS — Z90.710 ACQUIRED ABSENCE OF BOTH CERVIX AND UTERUS: Chronic | ICD-10-CM

## 2018-06-23 DIAGNOSIS — Z90.49 ACQUIRED ABSENCE OF OTHER SPECIFIED PARTS OF DIGESTIVE TRACT: Chronic | ICD-10-CM

## 2018-06-23 DIAGNOSIS — Z98.890 OTHER SPECIFIED POSTPROCEDURAL STATES: Chronic | ICD-10-CM

## 2018-06-23 DIAGNOSIS — N81.4 UTEROVAGINAL PROLAPSE, UNSPECIFIED: Chronic | ICD-10-CM

## 2018-06-23 DIAGNOSIS — Z98.49 CATARACT EXTRACTION STATUS, UNSPECIFIED EYE: Chronic | ICD-10-CM

## 2018-06-23 PROCEDURE — 93971 EXTREMITY STUDY: CPT | Mod: 26,RT

## 2018-06-23 PROCEDURE — 93971 EXTREMITY STUDY: CPT

## 2018-06-25 LAB
25(OH)D3 SERPL-MCNC: 22.2 NG/ML
ALBUMIN SERPL ELPH-MCNC: 4.2 G/DL
ALP BLD-CCNC: 106 U/L
ALT SERPL-CCNC: 26 U/L
ANION GAP SERPL CALC-SCNC: 13 MMOL/L
APPEARANCE: ABNORMAL
AST SERPL-CCNC: 28 U/L
BACTERIA: NEGATIVE
BILIRUB SERPL-MCNC: 0.4 MG/DL
BILIRUBIN URINE: NEGATIVE
BLOOD URINE: NEGATIVE
BUN SERPL-MCNC: 18 MG/DL
CALCIUM OXALATE CRYSTALS: ABNORMAL
CALCIUM SERPL-MCNC: 9.6 MG/DL
CHLORIDE SERPL-SCNC: 101 MMOL/L
CHOLEST SERPL-MCNC: 197 MG/DL
CHOLEST/HDLC SERPL: 3.6 RATIO
CO2 SERPL-SCNC: 25 MMOL/L
COLOR: YELLOW
CREAT SERPL-MCNC: 0.94 MG/DL
CREAT SPEC-SCNC: 155 MG/DL
DEPRECATED D DIMER PPP IA-ACNC: 197 NG/ML DDU
FOLATE SERPL-MCNC: 6.3 NG/ML
GLUCOSE QUALITATIVE U: NEGATIVE MG/DL
GLUCOSE SERPL-MCNC: 106 MG/DL
HBA1C MFR BLD HPLC: 5.9 %
HCV AB SER QL: NONREACTIVE
HCV S/CO RATIO: 0.1 S/CO
HDLC SERPL-MCNC: 55 MG/DL
HIV1+2 AB SPEC QL IA.RAPID: NONREACTIVE
HYALINE CASTS: 1 /LPF
KETONES URINE: NEGATIVE
LDLC SERPL CALC-MCNC: 115 MG/DL
LEUKOCYTE ESTERASE URINE: ABNORMAL
MICROALBUMIN 24H UR DL<=1MG/L-MCNC: 2.9 MG/DL
MICROALBUMIN/CREAT 24H UR-RTO: 19 MG/G
MICROSCOPIC-UA: NORMAL
NITRITE URINE: NEGATIVE
PH URINE: 5
POTASSIUM SERPL-SCNC: 5.1 MMOL/L
PROT SERPL-MCNC: 7.9 G/DL
PROTEIN URINE: NEGATIVE MG/DL
RED BLOOD CELLS URINE: 3 /HPF
SODIUM SERPL-SCNC: 139 MMOL/L
SPECIFIC GRAVITY URINE: 1.02
SQUAMOUS EPITHELIAL CELLS: 15 /HPF
T4 FREE SERPL-MCNC: 1.1 NG/DL
TRIGL SERPL-MCNC: 135 MG/DL
TSH SERPL-ACNC: 1.87 UIU/ML
UROBILINOGEN URINE: NEGATIVE MG/DL
VIT B12 SERPL-MCNC: 411 PG/ML
WHITE BLOOD CELLS URINE: 20 /HPF

## 2018-07-16 PROBLEM — N23 UNSPECIFIED RENAL COLIC: Chronic | Status: INACTIVE | Noted: 2017-05-05 | Resolved: 2017-11-30

## 2018-07-16 PROBLEM — I10 ESSENTIAL (PRIMARY) HYPERTENSION: Chronic | Status: ACTIVE | Noted: 2017-11-30

## 2018-07-16 PROBLEM — J43.9 EMPHYSEMA, UNSPECIFIED: Chronic | Status: ACTIVE | Noted: 2017-11-30

## 2018-07-16 PROBLEM — C53.9 MALIGNANT NEOPLASM OF CERVIX UTERI, UNSPECIFIED: Chronic | Status: ACTIVE | Noted: 2017-05-05

## 2018-07-20 ENCOUNTER — RX RENEWAL (OUTPATIENT)
Age: 73
End: 2018-07-20

## 2018-08-13 ENCOUNTER — RX RENEWAL (OUTPATIENT)
Age: 73
End: 2018-08-13

## 2018-08-13 RX ORDER — VALSARTAN 320 MG/1
320 TABLET, COATED ORAL
Qty: 90 | Refills: 3 | Status: DISCONTINUED | COMMUNITY
Start: 2018-06-23 | End: 2018-08-13

## 2018-08-15 ENCOUNTER — EMERGENCY (EMERGENCY)
Facility: HOSPITAL | Age: 73
LOS: 1 days | Discharge: HOME | End: 2018-08-15
Attending: EMERGENCY MEDICINE
Payer: MEDICARE

## 2018-08-15 VITALS
SYSTOLIC BLOOD PRESSURE: 170 MMHG | OXYGEN SATURATION: 99 % | WEIGHT: 251.99 LBS | HEART RATE: 106 BPM | DIASTOLIC BLOOD PRESSURE: 80 MMHG | RESPIRATION RATE: 19 BRPM | HEIGHT: 63 IN | TEMPERATURE: 98 F

## 2018-08-15 VITALS
OXYGEN SATURATION: 98 % | SYSTOLIC BLOOD PRESSURE: 145 MMHG | RESPIRATION RATE: 18 BRPM | HEART RATE: 90 BPM | DIASTOLIC BLOOD PRESSURE: 81 MMHG | TEMPERATURE: 98 F

## 2018-08-15 DIAGNOSIS — Z90.49 ACQUIRED ABSENCE OF OTHER SPECIFIED PARTS OF DIGESTIVE TRACT: Chronic | ICD-10-CM

## 2018-08-15 DIAGNOSIS — Z98.890 OTHER SPECIFIED POSTPROCEDURAL STATES: Chronic | ICD-10-CM

## 2018-08-15 DIAGNOSIS — Z98.49 CATARACT EXTRACTION STATUS, UNSPECIFIED EYE: Chronic | ICD-10-CM

## 2018-08-15 DIAGNOSIS — N81.4 UTEROVAGINAL PROLAPSE, UNSPECIFIED: Chronic | ICD-10-CM

## 2018-08-15 DIAGNOSIS — Z90.710 ACQUIRED ABSENCE OF BOTH CERVIX AND UTERUS: Chronic | ICD-10-CM

## 2018-08-15 PROBLEM — R80.9 PROTEINURIA, UNSPECIFIED: Chronic | Status: ACTIVE | Noted: 2017-11-30

## 2018-08-15 PROBLEM — F17.200 NICOTINE DEPENDENCE, UNSPECIFIED, UNCOMPLICATED: Chronic | Status: ACTIVE | Noted: 2017-11-30

## 2018-08-15 PROBLEM — M48.061 SPINAL STENOSIS, LUMBAR REGION WITHOUT NEUROGENIC CLAUDICATION: Chronic | Status: ACTIVE | Noted: 2017-11-30

## 2018-08-15 PROBLEM — M51.26 OTHER INTERVERTEBRAL DISC DISPLACEMENT, LUMBAR REGION: Chronic | Status: ACTIVE | Noted: 2017-11-30

## 2018-08-15 PROBLEM — E66.01 MORBID (SEVERE) OBESITY DUE TO EXCESS CALORIES: Chronic | Status: ACTIVE | Noted: 2017-11-30

## 2018-08-15 LAB
ALBUMIN SERPL ELPH-MCNC: 3.3 G/DL — SIGNIFICANT CHANGE UP (ref 3.3–5)
ALP SERPL-CCNC: 100 U/L — SIGNIFICANT CHANGE UP (ref 40–120)
ALT FLD-CCNC: 46 U/L — SIGNIFICANT CHANGE UP (ref 12–78)
AMYLASE P1 CFR SERPL: 43 U/L — SIGNIFICANT CHANGE UP (ref 25–115)
ANION GAP SERPL CALC-SCNC: 7 MMOL/L — SIGNIFICANT CHANGE UP (ref 5–17)
AST SERPL-CCNC: 32 U/L — SIGNIFICANT CHANGE UP (ref 15–37)
BILIRUB SERPL-MCNC: 0.3 MG/DL — SIGNIFICANT CHANGE UP (ref 0.2–1.2)
BUN SERPL-MCNC: 17 MG/DL — SIGNIFICANT CHANGE UP (ref 7–23)
CALCIUM SERPL-MCNC: 9.2 MG/DL — SIGNIFICANT CHANGE UP (ref 8.5–10.1)
CHLORIDE SERPL-SCNC: 106 MMOL/L — SIGNIFICANT CHANGE UP (ref 96–108)
CO2 SERPL-SCNC: 27 MMOL/L — SIGNIFICANT CHANGE UP (ref 22–31)
CREAT SERPL-MCNC: 0.84 MG/DL — SIGNIFICANT CHANGE UP (ref 0.5–1.3)
GLUCOSE SERPL-MCNC: 93 MG/DL — SIGNIFICANT CHANGE UP (ref 70–99)
HCT VFR BLD CALC: 38.3 % — SIGNIFICANT CHANGE UP (ref 34.5–45)
HGB BLD-MCNC: 12.7 G/DL — SIGNIFICANT CHANGE UP (ref 11.5–15.5)
LIDOCAIN IGE QN: 94 U/L — SIGNIFICANT CHANGE UP (ref 73–393)
MCHC RBC-ENTMCNC: 30.4 PG — SIGNIFICANT CHANGE UP (ref 27–34)
MCHC RBC-ENTMCNC: 33.2 GM/DL — SIGNIFICANT CHANGE UP (ref 32–36)
MCV RBC AUTO: 91.6 FL — SIGNIFICANT CHANGE UP (ref 80–100)
NRBC # BLD: 0 /100 WBCS — SIGNIFICANT CHANGE UP (ref 0–0)
PLATELET # BLD AUTO: 342 K/UL — SIGNIFICANT CHANGE UP (ref 150–400)
POTASSIUM SERPL-MCNC: 4.1 MMOL/L — SIGNIFICANT CHANGE UP (ref 3.5–5.3)
POTASSIUM SERPL-SCNC: 4.1 MMOL/L — SIGNIFICANT CHANGE UP (ref 3.5–5.3)
PROT SERPL-MCNC: 7.9 G/DL — SIGNIFICANT CHANGE UP (ref 6–8.3)
RBC # BLD: 4.18 M/UL — SIGNIFICANT CHANGE UP (ref 3.8–5.2)
RBC # FLD: 14.6 % — HIGH (ref 10.3–14.5)
SODIUM SERPL-SCNC: 140 MMOL/L — SIGNIFICANT CHANGE UP (ref 135–145)
WBC # BLD: 6.96 K/UL — SIGNIFICANT CHANGE UP (ref 3.8–10.5)
WBC # FLD AUTO: 6.96 K/UL — SIGNIFICANT CHANGE UP (ref 3.8–10.5)

## 2018-08-15 PROCEDURE — 99283 EMERGENCY DEPT VISIT LOW MDM: CPT | Mod: 25

## 2018-08-15 PROCEDURE — 83690 ASSAY OF LIPASE: CPT

## 2018-08-15 PROCEDURE — 80053 COMPREHEN METABOLIC PANEL: CPT

## 2018-08-15 PROCEDURE — 99284 EMERGENCY DEPT VISIT MOD MDM: CPT

## 2018-08-15 PROCEDURE — 96361 HYDRATE IV INFUSION ADD-ON: CPT

## 2018-08-15 PROCEDURE — 96360 HYDRATION IV INFUSION INIT: CPT

## 2018-08-15 PROCEDURE — 85027 COMPLETE CBC AUTOMATED: CPT

## 2018-08-15 PROCEDURE — 36415 COLL VENOUS BLD VENIPUNCTURE: CPT

## 2018-08-15 PROCEDURE — 82150 ASSAY OF AMYLASE: CPT

## 2018-08-15 RX ORDER — SODIUM CHLORIDE 9 MG/ML
1000 INJECTION INTRAMUSCULAR; INTRAVENOUS; SUBCUTANEOUS ONCE
Qty: 0 | Refills: 0 | Status: COMPLETED | OUTPATIENT
Start: 2018-08-15 | End: 2018-08-15

## 2018-08-15 RX ADMIN — SODIUM CHLORIDE 1000 MILLILITER(S): 9 INJECTION INTRAMUSCULAR; INTRAVENOUS; SUBCUTANEOUS at 23:10

## 2018-08-15 RX ADMIN — SODIUM CHLORIDE 1000 MILLILITER(S): 9 INJECTION INTRAMUSCULAR; INTRAVENOUS; SUBCUTANEOUS at 21:25

## 2018-08-15 RX ADMIN — SODIUM CHLORIDE 1000 MILLILITER(S): 9 INJECTION INTRAMUSCULAR; INTRAVENOUS; SUBCUTANEOUS at 22:20

## 2018-08-15 NOTE — ED ADULT NURSE NOTE - OBJECTIVE STATEMENT
Pt A&Ox4, ambulatory to ED c/o diarrhea and weakness.  Pt states that six days ago she ate some chicken that "wasn't good" and developed abdominal pain and diarrhea.  Pt states that initially she had copious amounts of diarrhea but for the past few days she only has diarrhea when she eats or drinks.  Pt states she feels dehydrated and weak.  Pt spoke with PMD who told her to go to ED and give sample to r/o cdiff.

## 2018-08-15 NOTE — ED ADULT NURSE NOTE - PMH
Emphysema lung  mild, on CT scan 2017  HTN (hypertension)  diagnosed 10/2017  Lumbar herniated disc    Malignant neoplasm of cervix, unspecified site  s/p ROBERTO 1978  Morbid obesity with BMI of 40.0-44.9, adult    TYLOR (obstructive sleep apnea)  as per [facility] criteria  Proteinuria    Renal calculus, bilateral    Smoker  1/2 pack X 55 years  Spinal stenosis of lumbar region Emphysema lung  mild, on CT scan 2017  HTN (hypertension)  diagnosed 10/2017  Lumbar herniated disc    Malignant neoplasm of cervix, unspecified site  s/p ROBERTO 1978  Morbid obesity with BMI of 40.0-44.9, adult    TYLOR (obstructive sleep apnea)  as per [facility] criteria  Proteinuria    Renal calculus, bilateral    Smoker  1/2 pack X 55 years  Spinal stenosis of lumbar region

## 2018-08-15 NOTE — ED ADULT NURSE REASSESSMENT NOTE - NS ED NURSE REASSESS COMMENT FT1
Pt had 2 liters IV fluids, ate crackers and applesauce and drank water, was still unable to produce bowel movement.  Pt D/C home to follow up with PMD and GI.

## 2018-08-15 NOTE — ED ADULT NURSE NOTE - CHPI ED NUR SYMPTOMS NEG
no abdominal distension/no dysuria/no fever/no blood in stool/no vomiting/no burning urination/no chills/no hematuria/no nausea

## 2018-08-15 NOTE — ED ADULT NURSE NOTE - NSIMPLEMENTINTERV_GEN_ALL_ED
Implemented All Universal Safety Interventions:  [place] to call system. Call bell, personal items and telephone within reach. Instruct patient to call for assistance. Room bathroom lighting operational. Non-slip footwear when patient is off stretcher. Physically safe environment: no spills, clutter or unnecessary equipment. Stretcher in lowest position, wheels locked, appropriate side rails in place.

## 2018-08-15 NOTE — ED PROVIDER NOTE - PMH
Emphysema lung  mild, on CT scan 2017  HTN (hypertension)  diagnosed 10/2017  Lumbar herniated disc    Malignant neoplasm of cervix, unspecified site  s/p ROBERTO 1978  Morbid obesity with BMI of 40.0-44.9, adult    TYLOR (obstructive sleep apnea)  as per [facility] criteria  Proteinuria    Renal calculus, bilateral    Smoker  1/2 pack X 55 years  Spinal stenosis of lumbar region

## 2018-08-17 ENCOUNTER — OTHER (OUTPATIENT)
Age: 73
End: 2018-08-17

## 2018-10-25 ENCOUNTER — APPOINTMENT (OUTPATIENT)
Dept: INTERNAL MEDICINE | Facility: CLINIC | Age: 73
End: 2018-10-25
Payer: MEDICARE

## 2018-10-25 VITALS
HEART RATE: 89 BPM | DIASTOLIC BLOOD PRESSURE: 80 MMHG | BODY MASS INDEX: 46.11 KG/M2 | HEIGHT: 62.5 IN | OXYGEN SATURATION: 97 % | TEMPERATURE: 97.9 F | WEIGHT: 257 LBS | SYSTOLIC BLOOD PRESSURE: 148 MMHG

## 2018-10-25 DIAGNOSIS — F17.200 NICOTINE DEPENDENCE, UNSPECIFIED, UNCOMPLICATED: ICD-10-CM

## 2018-10-25 LAB
BASOPHILS # BLD AUTO: 0.02 K/UL
BASOPHILS NFR BLD AUTO: 0.3 %
EOSINOPHIL # BLD AUTO: 0.12 K/UL
EOSINOPHIL NFR BLD AUTO: 1.6 %
HCT VFR BLD CALC: 40.8 %
HGB BLD-MCNC: 13.2 G/DL
IMM GRANULOCYTES NFR BLD AUTO: 0.3 %
LYMPHOCYTES # BLD AUTO: 1.95 K/UL
LYMPHOCYTES NFR BLD AUTO: 26.3 %
MAN DIFF?: NORMAL
MCHC RBC-ENTMCNC: 29.9 PG
MCHC RBC-ENTMCNC: 32.4 GM/DL
MCV RBC AUTO: 92.5 FL
MONOCYTES # BLD AUTO: 0.53 K/UL
MONOCYTES NFR BLD AUTO: 7.1 %
NEUTROPHILS # BLD AUTO: 4.78 K/UL
NEUTROPHILS NFR BLD AUTO: 64.4 %
PLATELET # BLD AUTO: 357 K/UL
RBC # BLD: 4.41 M/UL
RBC # FLD: 16.2 %
WBC # FLD AUTO: 7.42 K/UL

## 2018-10-25 PROCEDURE — 99214 OFFICE O/P EST MOD 30 MIN: CPT | Mod: 25

## 2018-10-25 PROCEDURE — G0008: CPT

## 2018-10-25 PROCEDURE — 90662 IIV NO PRSV INCREASED AG IM: CPT

## 2018-10-25 RX ORDER — CILOSTAZOL 100 MG/1
100 TABLET ORAL
Qty: 180 | Refills: 3 | Status: DISCONTINUED | COMMUNITY
Start: 2018-08-13 | End: 2018-10-25

## 2018-10-30 NOTE — ASSESSMENT
[FreeTextEntry1] : \par 1.  Compression stockings\par 2.  Do PFT's.  She promises to go.\par 3.  Is going to [employer].  To start graded walking.\par 4.  Flu shot today\par 5.  To work on weight loss strategies.\par 6.  HTN - BP is acceptable.  Continue current management.\par 7.  Return to office in 4-6 months or as needed

## 2018-10-30 NOTE — HISTORY OF PRESENT ILLNESS
[de-identified] : \par Shawna dougherty a 73-year-old female with a history of mild emphysema, essential hypertension, intermittent claudication, nephrolithiasis and morbid obesity who presents to the office today for follow-up.  She had seen a vascular surgeon who had placed her on cilostazol.for vascular disease but was stopped when she developed  palpitations and atypical CP.  Symptoms resolved.  Made her feel like she was having indigestion and it would make her sit up in bed.  Has not been using compression stockings because she could not get them on.  She is awaiting ones with zippers.\par \par She is otherwise doing well.  She was supposed to go for pulmonary function test but has yet to schedule the appointment.  She continues to refrain from smoking.  She does get intermittent shortness of breath.  She denies any problems with her olmesartan.  No complaints of lightheadedness or dizziness.

## 2018-12-30 NOTE — HEALTH RISK ASSESSMENT
[Fair] : ~his/her~ current health as fair  [Good] : ~his/her~  mood as  good [] : No [de-identified] : occasional [Retired] : retired [] :  [Fully functional (bathing, dressing, toileting, transferring, walking, feeding)] : Fully functional (bathing, dressing, toileting, transferring, walking, feeding) [Fully functional (using the telephone, shopping, preparing meals, housekeeping, doing laundry, using] : Fully functional and needs no help or supervision to perform IADLs (using the telephone, shopping, preparing meals, housekeeping, doing laundry, using transportation, managing medications and managing finances) [Smoke Detector] : smoke detector [Carbon Monoxide Detector] : carbon monoxide detector [Guns at Home] : no guns at home [Seat Belt] :  uses seat belt [Sunscreen] : uses sunscreen

## 2018-12-30 NOTE — HISTORY OF PRESENT ILLNESS
[de-identified] : \par 2 yrs of LLE swelling - should have been on a blood thinner.  Saw Dr. Fang in [place] last July.  Her urologist told her not to take a blood thinner.  \par \par On Valsartan for HTN - finds pill to large\par \par +SOB.  Has gained weight and stopped smoking Dec 1, 2017.  Does not cough.  No recent PFT's.  \par \par Refused a stress test because it hurts too much.  Sounds like she had a pharmacological stress test.Had an echo last year for PST which was fine.  Her  cardiologist is out of her network.\par \par

## 2019-01-28 ENCOUNTER — RESULT REVIEW (OUTPATIENT)
Age: 74
End: 2019-01-28

## 2019-01-28 LAB — HEMOCCULT STL QL IA: NEGATIVE

## 2019-02-04 ENCOUNTER — RX RENEWAL (OUTPATIENT)
Age: 74
End: 2019-02-04

## 2019-02-07 ENCOUNTER — RX RENEWAL (OUTPATIENT)
Age: 74
End: 2019-02-07

## 2019-02-14 ENCOUNTER — RX RENEWAL (OUTPATIENT)
Age: 74
End: 2019-02-14

## 2019-02-15 ENCOUNTER — RX RENEWAL (OUTPATIENT)
Age: 74
End: 2019-02-15

## 2019-02-19 RX ORDER — OLMESARTAN MEDOXOMIL 20 MG/1
20 TABLET, FILM COATED ORAL DAILY
Qty: 180 | Refills: 3 | Status: DISCONTINUED | COMMUNITY
Start: 2019-02-15 | End: 2019-02-19

## 2019-03-21 ENCOUNTER — APPOINTMENT (OUTPATIENT)
Dept: INTERNAL MEDICINE | Facility: CLINIC | Age: 74
End: 2019-03-21

## 2019-05-09 ENCOUNTER — RX RENEWAL (OUTPATIENT)
Age: 74
End: 2019-05-09

## 2019-05-10 ENCOUNTER — MEDICATION RENEWAL (OUTPATIENT)
Age: 74
End: 2019-05-10

## 2019-05-15 ENCOUNTER — TRANSCRIPTION ENCOUNTER (OUTPATIENT)
Age: 74
End: 2019-05-15

## 2019-07-01 NOTE — ED ADULT NURSE NOTE - THOUGHTS OF HOMICIDE/VIOLENCE TOWARDS OTHERS YN, MLM
You are now on 10mg, (half of 20mg)     1. Throw away 20mg of prednisone, because a new prescription of 10mg was sent to [facility]     3. Tomorrow start 10mg every morning and continue for 4 weeks (through August 1st)    4. Then take 5mg (half of 10mg) every morning for 4 weeks     5. Stop Lasix/furosemide (water pill to increase urination)     6.  You will see me around 6 weeks  
No

## 2019-07-09 ENCOUNTER — APPOINTMENT (OUTPATIENT)
Dept: INTERNAL MEDICINE | Facility: CLINIC | Age: 74
End: 2019-07-09
Payer: MEDICARE

## 2019-07-09 VITALS
HEIGHT: 62.5 IN | SYSTOLIC BLOOD PRESSURE: 130 MMHG | TEMPERATURE: 97.9 F | BODY MASS INDEX: 45.22 KG/M2 | DIASTOLIC BLOOD PRESSURE: 70 MMHG | WEIGHT: 252 LBS

## 2019-07-09 PROCEDURE — 99214 OFFICE O/P EST MOD 30 MIN: CPT

## 2019-07-09 RX ORDER — CEFADROXIL 500 MG/1
500 CAPSULE ORAL
Qty: 28 | Refills: 0 | Status: COMPLETED | COMMUNITY
Start: 2019-06-28

## 2019-07-09 NOTE — REVIEW OF SYSTEMS
[Fatigue] : fatigue [Lower Ext Edema] : lower extremity edema [Negative] : Musculoskeletal [Fever] : no fever [Chills] : no chills [Chest Pain] : no chest pain [Paroysmal Nocturnal Dyspnea] : no paroysmal nocturnal dyspnea [FreeTextEntry5] : can now sleep flat [de-identified] : see hpi

## 2019-07-09 NOTE — ASSESSMENT
[FreeTextEntry1] : Referred to wound [email or web address] - called on her behalf - set up appt for Friday 7/12 @ 2PM with Dr Gillis

## 2019-07-09 NOTE — PHYSICAL EXAM
[No Acute Distress] : no acute distress [Well-Appearing] : well-appearing [Normal Sclera/Conjunctiva] : normal sclera/conjunctiva [EOMI] : extraocular movements intact [Normal Outer Ear/Nose] : the outer ears and nose were normal in appearance [No JVD] : no jugular venous distention [No Lymphadenopathy] : no lymphadenopathy [No Respiratory Distress] : no respiratory distress  [No Accessory Muscle Use] : no accessory muscle use [Regular Rhythm] : with a regular rhythm [Normal Rate] : normal [Rhythm Regular] : regular [___+] : [unfilled]U+ pretibial pitting edema on the right [Normal Posterior Cervical Nodes] : no posterior cervical lymphadenopathy [Normal Anterior Cervical Nodes] : no anterior cervical lymphadenopathy [No CVA Tenderness] : no CVA  tenderness [No Spinal Tenderness] : no spinal tenderness [No Joint Swelling] : no joint swelling [Grossly Normal Strength/Tone] : grossly normal strength/tone [No Rash] : no rash [Coordination Grossly Intact] : coordination grossly intact [No Focal Deficits] : no focal deficits [de-identified] : right leg wound - incision currently c/d/ beginning to scab over. not fluctuant. surrounding erythema of ~ 1.5 - 2 cm, and leg warm compared to the left with ~ 1+ - 2 pitting edema. mildly tender.

## 2019-07-09 NOTE — HISTORY OF PRESENT ILLNESS
[FreeTextEntry1] : leg wound [de-identified] : Recent had 4 vessel CABG - harvested 2 veins from her right leg.\par By her report, had post op leg wound infection - put on Bactrim while in rehab - was on it for 12 days - no significant improvement.  Went to ER at [facility] June 28th - one dose of cefadroxil IV and then sent home  on  mg 2 caps BID for 7 days. Finished 2 days ago. To her view, no change in past week. Looks just like it did when she went to the ER\par Saw Surgeon 7/3 who thinks the wound inflamed, not infected.  Saw cardiology - thought might be infected. no fever, no myalgias, no N/V.\par Had 2 LE venous Doppler's of  leg - no DVT. once in rehab and once in ER.

## 2019-07-12 ENCOUNTER — APPOINTMENT (OUTPATIENT)
Dept: WOUND CARE | Facility: CLINIC | Age: 74
End: 2019-07-12
Payer: MEDICARE

## 2019-07-12 VITALS — TEMPERATURE: 98 F

## 2019-07-12 PROCEDURE — 99203 OFFICE O/P NEW LOW 30 MIN: CPT

## 2019-07-12 NOTE — PHYSICAL EXAM
[2+] : right 2+ [Ankle Swelling (On Exam)] : present [Ankle Swelling On The Right] : of the right ankle [JVD] : no jugular venous distention  [de-identified] : overweight , NAD, well groomed [de-identified] : non labored [de-identified] : healed sternotomy [de-identified] : ambulatory [FreeTextEntry1] : RIGHT LEG  [FreeTextEntry2] : 2 cm [FreeTextEntry3] : 2 cm [FreeTextEntry4] : .1 cm [de-identified] : honey [FreeTextEntry5] : #10 scalpel / forceps  [de-identified] : Both Ft and PT vein harvest wounds with induration proximal and distal along tract of vein harvest\par No fluctuance

## 2019-07-12 NOTE — REASON FOR VISIT
[Consultation] : a consultation visit [Family Member] : family member [FreeTextEntry1] : right leg wound

## 2019-07-12 NOTE — ASSESSMENT
[FreeTextEntry1] : Status discussed- possible relationship to smoking discussed \par Encouraged cardiac f/u\par VN forms completed \par RTO 2 wks \par No smoking

## 2019-07-12 NOTE — HISTORY OF PRESENT ILLNESS
[FreeTextEntry1] : 75 yo non diabetic female , former smoker, approx 5 wks s/p CABG at [facility], has been treated for right leg wound with several courses of Abx\par Has had f/u with cardiac surgeon , Christine\luisana c/o right leg wound

## 2019-07-26 ENCOUNTER — APPOINTMENT (OUTPATIENT)
Dept: WOUND CARE | Facility: CLINIC | Age: 74
End: 2019-07-26
Payer: MEDICARE

## 2019-07-26 VITALS
BODY MASS INDEX: 43.94 KG/M2 | HEIGHT: 63 IN | SYSTOLIC BLOOD PRESSURE: 127 MMHG | TEMPERATURE: 98.2 F | HEART RATE: 106 BPM | DIASTOLIC BLOOD PRESSURE: 83 MMHG | WEIGHT: 248 LBS

## 2019-07-26 PROCEDURE — 11042 DBRDMT SUBQ TIS 1ST 20SQCM/<: CPT

## 2019-07-26 NOTE — PHYSICAL EXAM
[2+] : right 2+ [Ankle Swelling On The Right] : of the right ankle [Ankle Swelling (On Exam)] : present [4 x 4] : 4 x 4  [JVD] : no jugular venous distention  [Alert] : alert [de-identified] : non labored [de-identified] : overweight , NAD, well groomed [de-identified] : healed sternotomy [de-identified] : ambulatory [FreeTextEntry1] : RIGHT medial LEG - 2 wounds [FreeTextEntry2] : 2.8 cm [FreeTextEntry4] : 0.8 cm  [FreeTextEntry3] : .8  cm [FreeTextEntry5] : #15 scalpel / forceps  [de-identified] :  honey  [de-identified] : 6 ' cling  [de-identified] : Both Ft and PT vein harvest wounds with induration proximal and distal along tract of vein harvest\par No fluctuance\par \par prior measurements 2/2/.1\par 7/26 removed suture material and necrotic tissue , small amt.

## 2019-07-26 NOTE — HISTORY OF PRESENT ILLNESS
[FreeTextEntry1] : 75 yo non diabetic female , former smoker, approx 5 wks s/p CABG at [facility], has been treated for right leg wound with several courses of Abx\par Has had f/u with cardiac surgeon Christine\par c/o right leg wound\par 7/26 only had 1 Vn visit from [facility]- has been using Honey to right vein harvest wound

## 2019-07-26 NOTE — ASSESSMENT
[FreeTextEntry1] : Status discussed- possible relationship to smoking discussed \par Encouraged cardiac f/u\par VN forms completed \par RTO 2 wks \par No smoking\par 7/26 wound packed today with Aquacel as is Ft to fat layer- - may resume use of Honey in 3 days, with shower- No VN

## 2019-08-14 ENCOUNTER — APPOINTMENT (OUTPATIENT)
Dept: INTERNAL MEDICINE | Facility: CLINIC | Age: 74
End: 2019-08-14
Payer: MEDICARE

## 2019-08-14 VITALS
OXYGEN SATURATION: 96 % | HEART RATE: 94 BPM | WEIGHT: 250 LBS | DIASTOLIC BLOOD PRESSURE: 80 MMHG | BODY MASS INDEX: 44.3 KG/M2 | HEIGHT: 63 IN | SYSTOLIC BLOOD PRESSURE: 130 MMHG

## 2019-08-14 PROCEDURE — 99214 OFFICE O/P EST MOD 30 MIN: CPT

## 2019-08-14 RX ORDER — LOSARTAN POTASSIUM 100 MG/1
100 TABLET, FILM COATED ORAL
Qty: 90 | Refills: 0 | Status: DISCONTINUED | COMMUNITY
Start: 2019-05-10 | End: 2019-08-14

## 2019-08-14 NOTE — ASSESSMENT
[FreeTextEntry1] : 1.  Status post CABG complicated by a right lower extremity wound infection that has now mostly resolved.  Patient is doing well.  To start Toprol XL 25 mg daily.  Told patient that I agree with the addition of this medication and educated her as to the benefits of the medication in terms of increased coronary artery filling during diastole when diastole is longer.  To continue to follow-up with wound care and cardiology.\par 2.  HTN - BP is acceptable.  Continue current management.\par 3.  Obesity - weight loss encouraged.\par 4.  COPD not an issue at this time\par 5.  RTO 3 mos, flu shot at that time.

## 2019-08-14 NOTE — COUNSELING
[Benefits of weight loss discussed] : Benefits of weight loss discussed [Encouraged to increase physical activity] : Encouraged to increase physical activity [Decrease Portions] : decrease portions [Needs reinforcement, provided] : Patient needs reinforcement on understanding of disease, goals and obesity follow-up plan; reinforcement was provided

## 2019-08-14 NOTE — HISTORY OF PRESENT ILLNESS
[de-identified] : Shawna is a 74-year-old female with a history of recent CABG complicated by a wound infection in the leg, obesity, hypertension, mild emphysema and renal nephrolithiasis who presents to the office today for follow-up.  The right lower extremity wound is healing well.  It turns out that there was an infected stitch as the cause.  She is currently applying honey to the area with good results.  In addition, she saw cardiology who felt her heart rate was little too fast and started her on metoprolol XL 25 mg daily.  She had complained that when she lays down at night she tends to feel her heart beating.  She only picked it up yesterday and has yet to start taking it and wanted my opinion first.  Right lower extremity is swollen somewhat but no worse than it has been.  Firm to touch.

## 2019-08-30 ENCOUNTER — APPOINTMENT (OUTPATIENT)
Dept: WOUND CARE | Facility: CLINIC | Age: 74
End: 2019-08-30
Payer: MEDICARE

## 2019-08-30 PROCEDURE — 99213 OFFICE O/P EST LOW 20 MIN: CPT

## 2019-08-30 NOTE — HISTORY OF PRESENT ILLNESS
[FreeTextEntry1] : 75 yo non diabetic female , former smoker, approx 5 wks s/p CABG at [facility], has been treated for right leg wound with several courses of Abx\par Has had f/u with cardiac surgeon Christine\par c/o right leg wound\par 7/26 only had 1 Vn visit from [facility]- has been using Honey to right vein harvest wound\par 8/30 right vein harvest scar is much improved \par Told by Dr Valle to use compression hose- unable to don- advised Circ Aids

## 2019-08-30 NOTE — PHYSICAL EXAM
[Ankle Swelling (On Exam)] : present [Ankle Swelling On The Right] : of the right ankle [Alert] : alert [4 x 4] : 4 x 4  [JVD] : no jugular venous distention  [Ankle Swelling On The Left] : moderate [de-identified] : overweight , NAD, well groomed [de-identified] : non labored [de-identified] : healed sternotomy [de-identified] : ambulatory [FreeTextEntry1] : RIGHT medial LEG  [FreeTextEntry2] : 0.2 [FreeTextEntry4] : 0.2 [FreeTextEntry3] : 0.2 [de-identified] : Both Ft and PT vein harvest wounds with induration proximal and distal along tract of vein harvest\par No fluctuance\par \par prior measurements 2/2/.1\par 7/26 removed suture material and necrotic tissue , small amt.  [TWNoteComboBox3] : FT [TWNoteComboBox1] : Right [TWNoteComboBox4] : Small [TWNoteComboBox5] : No [TWNoteComboBox6] : Surgical [de-identified] : No [de-identified] : Normal [de-identified] : None [de-identified] : None [de-identified] : 100% [de-identified] : No [TWNoteComboBox7] : False

## 2019-08-30 NOTE — ASSESSMENT
[FreeTextEntry1] : Status discussed- possible relationship to smoking discussed \par Encouraged cardiac f/u\par VN forms completed \par RTO 2 wks \par No smoking\par 7/26 wound packed today with Aquacel as is Ft to fat layer- - may resume use of Honey in 3 days, with shower- No VN\par \par 8/30/19 Vascular surgeon Dr. Franco per patient she has venous hypertension and PAD, was prescribed Plavix but she had a reaction, only taking ASA at this time.\par Was told to use compression but unable to don. \par Will order circaid  calf 40cm  ankle 25cm   length 31cm

## 2019-10-17 ENCOUNTER — APPOINTMENT (OUTPATIENT)
Dept: WOUND CARE | Facility: CLINIC | Age: 74
End: 2019-10-17

## 2019-11-03 RX ORDER — OLMESARTAN MEDOXOMIL 40 MG/1
40 TABLET, FILM COATED ORAL
Qty: 90 | Refills: 0 | Status: DISCONTINUED | COMMUNITY
Start: 2018-08-13 | End: 2019-11-03

## 2019-11-05 ENCOUNTER — APPOINTMENT (OUTPATIENT)
Dept: INTERNAL MEDICINE | Facility: CLINIC | Age: 74
End: 2019-11-05
Payer: MEDICARE

## 2019-11-05 VITALS
OXYGEN SATURATION: 96 % | HEART RATE: 83 BPM | WEIGHT: 256 LBS | BODY MASS INDEX: 45.35 KG/M2 | DIASTOLIC BLOOD PRESSURE: 80 MMHG | SYSTOLIC BLOOD PRESSURE: 164 MMHG

## 2019-11-05 DIAGNOSIS — Z23 ENCOUNTER FOR IMMUNIZATION: ICD-10-CM

## 2019-11-05 PROCEDURE — 90662 IIV NO PRSV INCREASED AG IM: CPT

## 2019-11-05 PROCEDURE — 90732 PPSV23 VACC 2 YRS+ SUBQ/IM: CPT

## 2019-11-05 PROCEDURE — 99214 OFFICE O/P EST MOD 30 MIN: CPT | Mod: 25

## 2019-11-05 PROCEDURE — G0008: CPT

## 2019-11-05 PROCEDURE — G0009: CPT

## 2020-08-12 RX ORDER — METOPROLOL SUCCINATE 25 MG/1
25 TABLET, EXTENDED RELEASE ORAL DAILY
Qty: 90 | Refills: 3 | Status: DISCONTINUED | COMMUNITY
End: 2020-08-12

## 2020-08-12 RX ORDER — METOPROLOL SUCCINATE 100 MG/1
100 TABLET, EXTENDED RELEASE ORAL DAILY
Qty: 90 | Refills: 3 | Status: ACTIVE | COMMUNITY
Start: 2019-11-05

## 2020-12-14 ENCOUNTER — NON-APPOINTMENT (OUTPATIENT)
Age: 75
End: 2020-12-14

## 2020-12-15 ENCOUNTER — APPOINTMENT (OUTPATIENT)
Dept: INTERNAL MEDICINE | Facility: CLINIC | Age: 75
End: 2020-12-15
Payer: MEDICARE

## 2020-12-15 VITALS
BODY MASS INDEX: 45.89 KG/M2 | HEART RATE: 77 BPM | OXYGEN SATURATION: 95 % | HEIGHT: 63 IN | SYSTOLIC BLOOD PRESSURE: 160 MMHG | DIASTOLIC BLOOD PRESSURE: 80 MMHG | WEIGHT: 259 LBS

## 2020-12-15 DIAGNOSIS — Z13.39 ENCOUNTER FOR SCREENING EXAM FOR OTHER MENTAL HEALTH AND BEHAVIORAL DISORDERS: ICD-10-CM

## 2020-12-15 PROCEDURE — G0444 DEPRESSION SCREEN ANNUAL: CPT

## 2020-12-15 PROCEDURE — G0439: CPT

## 2020-12-15 PROCEDURE — G0442 ANNUAL ALCOHOL SCREEN 15 MIN: CPT

## 2020-12-15 PROCEDURE — 99072 ADDL SUPL MATRL&STAF TM PHE: CPT

## 2020-12-31 ENCOUNTER — TRANSCRIPTION ENCOUNTER (OUTPATIENT)
Age: 75
End: 2020-12-31

## 2020-12-31 PROBLEM — Z13.39 SCREENING FOR ALCOHOL PROBLEM: Status: RESOLVED | Noted: 2020-12-31 | Resolved: 2021-01-02

## 2020-12-31 RX ORDER — ATORVASTATIN CALCIUM 40 MG/1
40 TABLET, FILM COATED ORAL
Qty: 90 | Refills: 0 | Status: DISCONTINUED | COMMUNITY
Start: 2019-07-05 | End: 2020-12-31

## 2020-12-31 RX ORDER — NITROGLYCERIN 0.4 MG/1
0.4 TABLET SUBLINGUAL
Qty: 1 | Refills: 3 | Status: ACTIVE | COMMUNITY
Start: 2020-12-31 | End: 1900-01-01

## 2020-12-31 NOTE — HISTORY OF PRESENT ILLNESS
[de-identified] : 74 yo female with a h/o CAD s/p CABG, obesity, spinal stenosis, HTN, emphysema, nephrolithiasis and right knee pain here for her AWV.\par \par Has had episodes of chest pain in September for which she was hospitalized fir 3 days.  Has a closed graft and it made its own path for circulation/collaterals and the other one is clogged after a cardiac cath was done.  SHe brought in her records for me which will be scanned into the chart..  Was started on ranolazine.  2 days after that discharge she was admitted to [facility] for a fast, irregular heart beat and lightheadedness thought to be related to the institution of ranolazine.  Has been doing well since then.  Labs at that time were reviewed and were fine.\par \par \par \par

## 2020-12-31 NOTE — ASSESSMENT
[FreeTextEntry1] : 1.  [facility] - consider mammo once COVID is over although she is past the age of screening.  Had a colonoscopy in 2012.  Tetanus and Pneumonia shots are UTD.  Had a flu shot.  Will need a BMD once COVID is over.\par 2.  CAD with closure of grafts but good collaterals.  To continue with Renexa and metoprolol.  Now on Crestor.  F/U with cards\par 3.  COPD - mild on imaging.  Requires no meds.\par 4.  Had been doing Lung Ca screening with her prior MD's.  Will discuss at her next visit.  Has not smoked since 2017.\par 5.  Weight loss encouraged.\par 6.  HTN - BP is mildly elevated - to f/u with cards.  Likely has an element of white coat HTN.\par 7.  F/U 3-6 mos.\par \par

## 2020-12-31 NOTE — HEALTH RISK ASSESSMENT
[Fair] : ~his/her~ current health as fair  [No] : In the past 12 months have you used drugs other than those required for medical reasons? No [No falls in past year] : Patient reported no falls in the past year [0] : 1) Little interest or pleasure doing things: Not at all (0) [1] : 2) Feeling down, depressed, or hopeless for several days (1) [] : No [30 or more] : 30 or more [YearQuit] : 2017 [de-identified] : occ [de-identified] : unable [de-identified] : low sodium [QPK3Ixboh] : 1 [2] : 2 [LowDoseCTScan] : 07/17

## 2020-12-31 NOTE — PHYSICAL EXAM
[No Acute Distress] : no acute distress [Well Nourished] : well nourished [Well Developed] : well developed [Well-Appearing] : well-appearing [Normal Sclera/Conjunctiva] : normal sclera/conjunctiva [PERRL] : pupils equal round and reactive to light [EOMI] : extraocular movements intact [Normal Outer Ear/Nose] : the outer ears and nose were normal in appearance [Normal Oropharynx] : the oropharynx was normal [No JVD] : no jugular venous distention [No Lymphadenopathy] : no lymphadenopathy [Supple] : supple [Thyroid Normal, No Nodules] : the thyroid was normal and there were no nodules present [No Respiratory Distress] : no respiratory distress  [No Accessory Muscle Use] : no accessory muscle use [Clear to Auscultation] : lungs were clear to auscultation bilaterally [Normal Rate] : normal rate  [Regular Rhythm] : with a regular rhythm [Normal S1, S2] : normal S1 and S2 [No Murmur] : no murmur heard [No Carotid Bruits] : no carotid bruits [No Abdominal Bruit] : a ~M bruit was not heard ~T in the abdomen [No Varicosities] : no varicosities [Pedal Pulses Present] : the pedal pulses are present [No Edema] : there was no peripheral edema [No Palpable Aorta] : no palpable aorta [No Extremity Clubbing/Cyanosis] : no extremity clubbing/cyanosis [Soft] : abdomen soft [Non Tender] : non-tender [Non-distended] : non-distended [No Masses] : no abdominal mass palpated [No HSM] : no HSM [Normal Bowel Sounds] : normal bowel sounds [Normal Posterior Cervical Nodes] : no posterior cervical lymphadenopathy [Normal Anterior Cervical Nodes] : no anterior cervical lymphadenopathy [No CVA Tenderness] : no CVA  tenderness [No Spinal Tenderness] : no spinal tenderness [No Joint Swelling] : no joint swelling [Grossly Normal Strength/Tone] : grossly normal strength/tone [No Rash] : no rash [Coordination Grossly Intact] : coordination grossly intact [No Focal Deficits] : no focal deficits [Normal Gait] : normal gait [Normal Affect] : the affect was normal [Normal Insight/Judgement] : insight and judgment were intact [de-identified] : well healed surgical scars

## 2021-02-09 NOTE — ED ADULT NURSE NOTE - CAS EDN INTEG ASSESS
"    [facility] [place]        Subjective     CC: Follow-up (MRI F/U of right shoulder (01/26/21))      HPI    Eli Clayton is a 77 y.o. female.  Patient returns for follow-up of the MRI of her right shoulder.  She continues to struggle with pain that radiates across her clavicle and down her biceps.  She has difficulty with overhead activity.  She has difficulty sleeping at night.    Patient is also here for new problem today regarding her right knee.  She fell about 2 weeks ago and landed directly on her right knee which was replaced years ago by Dr. Valentin.  She has a lot of pain anteriorly.    Overall, patient's symptoms are worsening.    ROS:    Constiutional:Pt denies fever, chills, nausea, or vomiting.  MSK:as above        Objective      Past Medical History  Past Medical History:   Diagnosis Date   • Anesthesia complication     decreased oxygen and heart rate after knee surgery    • Arthritis     osteoarthritis   • Cancer (CMS/HCC)     cervical and skin   • Disease of thyroid gland    • GERD (gastroesophageal reflux disease)    • Hard of hearing     has hearing aid but does not wear it    • History of transfusion    • Interstitial cystitis    • PONV (postoperative nausea and vomiting)    • Wears dentures    • Wears glasses          Physical Exam  Pulse 81   Ht 165.1 cm (65\")   Wt 76.7 kg (169 lb)   SpO2 98%   BMI 28.12 kg/m²     Body mass index is 28.12 kg/m².    Patient is well nourished and well developed.        Ortho Exam  Musculoskeletal   Upper Extremity   Right Shoulder     Inspection and Palpation:     Medial border scapular tenderness-none    AC Joint Tenderness -moderate    Sensation is normal    Examination reveals no ecchymosis.        Strength and Tone:    Supraspinatus -4-5 with pain    External Onlwtove-5-6 with pain    Infraspinatus - 5/5    Subscapularis - 5/5 with mild pain    Deltoid - 5/5     Range of Motion      RightShoulder:    Internal Rotation: ROM - " L4    External Rotation: AROM - 60 degrees    Elevation through flexion: AROM - 140 degrees        Impingement   Right shoulder    Lopez-Melecio impingement test positive    Neer impingement test negative     Functional Testing   Right shoulder    AC crossover adduction test positive    Speeds test positive    Uppercut test negative    O'Briens test negative    Drop arm sign negative    Apprehension relocation negative    Right knee: Patient is able do straight leg raise.  5 out of 5 quad strength.  No palpable defect noted.  No knee effusion noted.  Tender over the patella to palpation.  The knee is stable to varus and valgus at 0 and 30 degrees.      Imaging/Labs/EMG Reviewed:  Imaging Results (Last 24 Hours)     ** No results found for the last 24 hours. **      We reviewed x-rays of her right knee today in the office.  I see no acute fractures.  No evidence of loosening of her DePuy implant.    We have also reviewed images and report of the MRI the patient's right shoulder.  Dr. Pennington has read this as no significant change compared to prior study.  She does have AC joint arthritis.  The biceps is at home.  Subscap for the most part appears to be okay.  There does appear to be a full-thickness injury to the anterior part of the supraspinatus with some thinning of the tendon as well.      Assessment    Assessment:  1. Traumatic incomplete tear of right rotator cuff, subsequent encounter    2. Cellulitis of right arm    3. Status post total right knee replacement    4. Arthritis of right acromioclavicular joint        Plan:  1. Recommend over the counter anti-inflammatories for pain and/or swelling  2. Pain in the right knee after a fall status post total knee arthroplasty--I think the patient is doing well overall.  I do not see any acute fractures or signs of tendon rupture.  Observe for now.  If things worsen or persist, we will lore-ray it or possibly CT scan will be ordered.  3. Right partial-thickness  rotator cuff tear with small anterior full-thickness component and AC joint arthritis--we discussed treatment options alternatives with the patient.  At this point, she would like to do something definitive.  I would like for her daughter to be present to ask questions as well as she is highly involved in her mother's care.  It is okay for her to come back for her preop appointment.  The risk, benefits, potential hazards of right shoulder arthroscopy, possible repair of the upper subscap, possible biceps tenodesis, repair of the rotator cuff and arthroscopic distal clavicle resection were discussed with her this morning.  She had the opportunity to ask questions and wishes to proceed with scheduling.  Again I will see her back preoperatively with her daughter to answer all questions.  We will do this as an outpatient at the hospital.      Tristan Gonzalez MD  02/09/21  13:00 EST      Dragon disclaimer:  Much of this encounter note is an electronic transcription/translation of spoken language to printed text. The electronic translation of spoken language may permit erroneous, or at times, nonsensical words or phrases to be inadvertently transcribed; Although I have reviewed the note for such errors, some may still exist.   - - -

## 2021-02-14 ENCOUNTER — EMERGENCY (EMERGENCY)
Facility: HOSPITAL | Age: 76
LOS: 1 days | Discharge: HOME | End: 2021-02-14
Attending: EMERGENCY MEDICINE | Admitting: EMERGENCY MEDICINE
Payer: MEDICARE

## 2021-02-14 VITALS
DIASTOLIC BLOOD PRESSURE: 63 MMHG | OXYGEN SATURATION: 96 % | TEMPERATURE: 98 F | HEART RATE: 84 BPM | HEIGHT: 63 IN | SYSTOLIC BLOOD PRESSURE: 150 MMHG | RESPIRATION RATE: 18 BRPM | WEIGHT: 259.93 LBS

## 2021-02-14 VITALS
SYSTOLIC BLOOD PRESSURE: 133 MMHG | HEART RATE: 75 BPM | TEMPERATURE: 99 F | DIASTOLIC BLOOD PRESSURE: 67 MMHG | OXYGEN SATURATION: 96 % | RESPIRATION RATE: 16 BRPM

## 2021-02-14 DIAGNOSIS — Z90.710 ACQUIRED ABSENCE OF BOTH CERVIX AND UTERUS: Chronic | ICD-10-CM

## 2021-02-14 DIAGNOSIS — Z98.890 OTHER SPECIFIED POSTPROCEDURAL STATES: Chronic | ICD-10-CM

## 2021-02-14 DIAGNOSIS — Z90.49 ACQUIRED ABSENCE OF OTHER SPECIFIED PARTS OF DIGESTIVE TRACT: Chronic | ICD-10-CM

## 2021-02-14 DIAGNOSIS — Z95.1 PRESENCE OF AORTOCORONARY BYPASS GRAFT: Chronic | ICD-10-CM

## 2021-02-14 DIAGNOSIS — Z98.49 CATARACT EXTRACTION STATUS, UNSPECIFIED EYE: Chronic | ICD-10-CM

## 2021-02-14 DIAGNOSIS — N81.4 UTEROVAGINAL PROLAPSE, UNSPECIFIED: Chronic | ICD-10-CM

## 2021-02-14 PROCEDURE — 73610 X-RAY EXAM OF ANKLE: CPT

## 2021-02-14 PROCEDURE — 99283 EMERGENCY DEPT VISIT LOW MDM: CPT | Mod: 25

## 2021-02-14 PROCEDURE — 73610 X-RAY EXAM OF ANKLE: CPT | Mod: 26,RT

## 2021-02-14 PROCEDURE — 99283 EMERGENCY DEPT VISIT LOW MDM: CPT

## 2021-02-14 NOTE — ED ADULT NURSE NOTE - PMH
CAD (coronary artery disease)    Cataract    Chronic bronchitis    Emphysema lung  mild, on CT scan 2017  HTN (hypertension)  diagnosed 10/2017  Lumbar herniated disc    Malignant neoplasm of cervix, unspecified site  s/p ROBERTO 1978  Morbid obesity with BMI of 40.0-44.9, adult    TYLOR (obstructive sleep apnea)  as per [facility] criteria  Proteinuria    Renal calculus, bilateral    Smoker  1/2 pack X 55 years  Spinal stenosis of lumbar region

## 2021-02-14 NOTE — ED ADULT NURSE NOTE - NSIMPLEMENTINTERV_GEN_ALL_ED
Implemented All Fall Risk Interventions:  [place] to call system. Call bell, personal items and telephone within reach. Instruct patient to call for assistance. Room bathroom lighting operational. Non-slip footwear when patient is off stretcher. Physically safe environment: no spills, clutter or unnecessary equipment. Stretcher in lowest position, wheels locked, appropriate side rails in place. Provide visual cue, wrist band, yellow gown, etc. Monitor gait and stability. Monitor for mental status changes and reorient to person, place, and time. Review medications for side effects contributing to fall risk. Reinforce activity limits and safety measures with patient and family.

## 2021-02-14 NOTE — ED ADULT TRIAGE NOTE - CHIEF COMPLAINT QUOTE
" I fell Monday and twisted my right ankle, it still hurts and it has been a little red for a while "

## 2021-02-14 NOTE — ED ADULT NURSE NOTE - OBJECTIVE STATEMENT
76 y/o female received aox4 ambulatory c/o right ankle pain x1 week. pt slipped and fell on ice on monday and has been experiencing right ankle discomfort since. pt is still able to bear weight and ambulate although c/o of pain while walking. no deformities noted.

## 2021-02-14 NOTE — ED PROVIDER NOTE - NSFOLLOWUPINSTRUCTIONS_ED_ALL_ED_FT
Ankle Sprain    WHAT YOU NEED TO KNOW:    An ankle sprain happens when 1 or more ligaments in your ankle joint stretch or tear. Ligaments are tough tissues that connect bones. Ligaments support your joints and keep your bones in place.    DISCHARGE INSTRUCTIONS:    Return to the emergency department if:   •You have severe pain in your ankle.      •Your foot or toes are cold or numb.      •Your ankle becomes more weak or unstable (wobbly).      •You are unable to put any weight on your ankle or foot.      •Your swelling has increased or returned.      Call your doctor if:   •Your pain does not go away, even after treatment.      •You have questions or concerns about your condition or care.      Medicines: You may need any of the following:   •NSAIDs, such as ibuprofen, help decrease swelling, pain, and fever. This medicine is available with or without a doctor's order. NSAIDs can cause stomach bleeding or kidney problems in certain people. If you take blood thinner medicine, always ask your healthcare provider if NSAIDs are safe for you. Always read the medicine label and follow directions.      •Acetaminophen decreases pain and fever. It is available without a doctor's order. Ask how much to take and how often to take it. Follow directions. Read the labels of all other medicines you are using to see if they also contain acetaminophen, or ask your doctor or pharmacist. Acetaminophen can cause liver damage if not taken correctly. Do not use more than 4 grams (4,000 milligrams) total of acetaminophen in one day.       •Prescription pain medicine may be given. Ask your healthcare provider how to take this medicine safely. Some prescription pain medicines contain acetaminophen. Do not take other medicines that contain acetaminophen without talking to your healthcare provider. Too much acetaminophen may cause liver damage. Prescription pain medicine may cause constipation. Ask your healthcare provider how to prevent or treat constipation.       •Take your medicine as directed. Contact your healthcare provider if you think your medicine is not helping or if you have side effects. Tell him or her if you are allergic to any medicine. Keep a list of the medicines, vitamins, and herbs you take. Include the amounts, and when and why you take them. Bring the list or the pill bottles to follow-up visits. Carry your medicine list with you in case of an emergency.      Self-care:   •Use support devices, such as a brace, cast, or splint, to limit your movement and protect your joint. You may need to use crutches to decrease your pain as you move around.      •Go to physical therapy as directed. A physical therapist teaches you exercises to help improve movement and strength, and to decrease pain.      •Rest your ankle so that it can heal. Return to normal activities as directed.      •Apply ice on your ankle for 15 to 20 minutes every hour or as directed. Use an ice pack, or put crushed ice in a plastic bag. Cover it with a towel. Ice helps prevent tissue damage and decreases swelling and pain.      •Compress your ankle. Ask if you should wrap an elastic bandage around your injured ligament. An elastic bandage provides support and helps decrease swelling and movement so your joint can heal. Wear as long as directed.  How to Wrap an Elastic Bandage           •Elevate your ankle above the level of your heart as often as you can. This will help decrease swelling and pain. Prop your ankle on pillows or blankets to keep it elevated comfortably.   Elevate Leg           Prevent another ankle sprain:   •Let your ankle heal. Find out how long your ligament needs to heal. Do not do any physical activity until your healthcare provider says it is okay. If you start activity too soon, you may develop a more serious injury.      •Always warm up and stretch before you exercise or play sports.      •Use the right equipment. Always wear shoes that fit well and are made for the activity that you are doing. You may also need ankle supports, elbow and knee pads, or braces.      Follow up with your doctor as directed: Write down your questions so you remember to ask them during your visits.

## 2021-02-14 NOTE — ED PROVIDER NOTE - PATIENT PORTAL LINK FT
You can access the FollowMyHealth Patient Portal offered by [facility] by registering at the following website: [email or web address]. By joining [employer]’s FollowMyHealth portal, you will also be able to view your health information using other applications (apps) compatible with our system.

## 2021-02-14 NOTE — ED PROVIDER NOTE - PMH
CAD (coronary artery disease)    Cataract    Chronic bronchitis    Emphysema lung  mild, on CT scan 2017  HTN (hypertension)  diagnosed 10/2017  Lumbar herniated disc    Malignant neoplasm of cervix, unspecified site  s/p ROBERTO 1978  Morbid obesity with BMI of 40.0-44.9, adult    TYLOR (obstructive sleep apnea)  as per [facility] criteria  Proteinuria    Renal calculus, bilateral    Smoker  1/2 pack X 55 years  Spinal stenosis of lumbar region

## 2021-02-14 NOTE — ED PROVIDER NOTE - OBJECTIVE STATEMENT
76 y/o F with PMHx of CAD s/p CABG x4, HTN, cervical CA s/p ROBERTO, chronic bronchitis, emphysema, TYLOR, kidney stone s/p lithotripsy, spinal stenosis, s/p L breast lumpectomy, s/p appendectomy, and s/p cholecystectomy presents to the ED c/o worsening +R ankle pain s/p mechanical slip and fall on ice 6 nights ago while walking on her driveway. No head injury or LOC. Has been taking Tylenol PM for pain. Walks with cane at baseline. On baby ASA BID. Former smoker. NKDA. PCP: Dr. Scarlet Kuhn. Cardiologist: Dr. Roberto Heck.

## 2021-02-14 NOTE — ED PROVIDER NOTE - CARE PROVIDER_API CALL
Amari Hull (DPM)  Podiatric Medicine and Surgery  [address] [place] [address]  Phone: [phone]  Fax: [phone]  Follow Up Time: 1-3 Days

## 2021-02-14 NOTE — ED PROVIDER NOTE - PSH
H/O abdominal hysterectomy  ROBERTO 1978  H/O lithotripsy  ESWL 1986 left, 2004 right, 2007 left  History of appendectomy  1954  Prolapse, uterovaginal  s/p TVT Sling 2000  S/P breast lumpectomy  left, benign  S/P CABG x 4    S/P cataract surgery  bilateral, 2015  S/P cholecystectomy  2005  S/P ERCP  2006

## 2021-02-14 NOTE — ED ADULT NURSE NOTE - NS_SISCREENINGSR_GEN_ALL_ED
----- Message from Kala Winn MD sent at 1/3/2020  4:03 PM CST -----  Please call the patient on his cell phone with normal results.  Do not call his parents.kh   Negative

## 2021-02-15 ENCOUNTER — NON-APPOINTMENT (OUTPATIENT)
Age: 76
End: 2021-02-15

## 2021-02-15 NOTE — POST DISCHARGE NOTE - DETAILS:
Ankle still hurts, encouraged to keep leg elevated, take pain meds as needed and schedule follow up with podiatrist as recommended.

## 2021-06-14 ENCOUNTER — NON-APPOINTMENT (OUTPATIENT)
Age: 76
End: 2021-06-14

## 2021-06-15 ENCOUNTER — APPOINTMENT (OUTPATIENT)
Dept: INTERNAL MEDICINE | Facility: CLINIC | Age: 76
End: 2021-06-15
Payer: MEDICARE

## 2021-06-15 VITALS
OXYGEN SATURATION: 95 % | BODY MASS INDEX: 47.66 KG/M2 | SYSTOLIC BLOOD PRESSURE: 170 MMHG | DIASTOLIC BLOOD PRESSURE: 80 MMHG | HEIGHT: 63 IN | WEIGHT: 269 LBS | HEART RATE: 76 BPM

## 2021-06-15 VITALS — DIASTOLIC BLOOD PRESSURE: 80 MMHG | SYSTOLIC BLOOD PRESSURE: 155 MMHG

## 2021-06-15 PROBLEM — J42 UNSPECIFIED CHRONIC BRONCHITIS: Chronic | Status: ACTIVE | Noted: 2021-02-14

## 2021-06-15 PROBLEM — H26.9 UNSPECIFIED CATARACT: Chronic | Status: ACTIVE | Noted: 2021-02-14

## 2021-06-15 PROCEDURE — 99213 OFFICE O/P EST LOW 20 MIN: CPT

## 2021-06-29 ENCOUNTER — APPOINTMENT (OUTPATIENT)
Dept: PULMONOLOGY | Facility: CLINIC | Age: 76
End: 2021-06-29
Payer: MEDICARE

## 2021-06-29 VITALS
BODY MASS INDEX: 47.21 KG/M2 | RESPIRATION RATE: 17 BRPM | DIASTOLIC BLOOD PRESSURE: 76 MMHG | WEIGHT: 266.5 LBS | HEART RATE: 71 BPM | TEMPERATURE: 98.4 F | OXYGEN SATURATION: 96 % | SYSTOLIC BLOOD PRESSURE: 138 MMHG

## 2021-06-29 PROCEDURE — 99204 OFFICE O/P NEW MOD 45 MIN: CPT

## 2021-06-29 NOTE — ASSESSMENT
[Obesity, Class III, BMI 40-49.9 (E66.01)] : macrophage activation syndrome [FreeTextEntry1] : 77yo F with history of CAD s/p CABG, PCI, prediabetes, hypertension, presenting for evaluation of possible sleep disordered breathing. She complains of heavy snoring, witnessed apneas, nocturnal awakenings, gasping, choking. She is a light sleeper and wakes up to go to the bathroom often. She also has obesity. She has trouble maintaining sleep. She also has a history of GERD. She just had a stent in for angina and her chest pain has improved since then. \par \par Will order diagnostic PSG to evaluate for TYLOR/CSA\par I explained the rationale for treatment of TYLOR -- to improve quality of life, daytime function and to decrease the cardiometabolic and other medical risks that are associated with untreated TYLOR. The patient verbalized understanding.\par I also explained that the patient can expect a follow up call once results of the above study becomes available.\par

## 2021-06-29 NOTE — HISTORY OF PRESENT ILLNESS
[FreeTextEntry1] : 77yo F with history of CAD s/p CABG, PCI, prediabetes, hypertension, presenting for evaluation of possible sleep disordered breathing. She complains of heavy snoring, witnessed apneas, nocturnal awakenings, gasping, choking. She is a light sleeper and wakes up to go to the bathroom often. She also has obesity. She has trouble maintaining sleep. She also has a history of GERD. She just had a stent in for angina and her chest pain has improved since then.

## 2021-06-29 NOTE — CONSULT LETTER
[Dear  ___] : Dear  [unfilled], [Consult Letter:] : I had the pleasure of evaluating your patient, [unfilled]. [Please see my note below.] : Please see my note below. [Consult Closing:] : Thank you very much for allowing me to participate in the care of this patient.  If you have any questions, please do not hesitate to contact me. [Sincerely,] : Sincerely, [FreeTextEntry3] : Anila Sims MD

## 2021-06-29 NOTE — PHYSICAL EXAM
[General Appearance - In No Acute Distress] : no acute distress [Normal Conjunctiva] : the conjunctiva exhibited no abnormalities [Low Lying Soft Palate] : low lying soft palate [Neck Appearance] : the appearance of the neck was normal [Heart Sounds] : normal S1 and S2 [Heart Rate And Rhythm] : heart rate was normal and rhythm regular [Auscultation Breath Sounds / Voice Sounds] : lungs were clear to auscultation bilaterally [Involuntary Movements] : no involuntary movements were seen [Nail Clubbing] : no clubbing of the fingernails [Non-Pitting] : non-pitting [Skin Color & Pigmentation] : normal skin color and pigmentation [No Focal Deficits] : no focal deficits [Oriented To Time, Place, And Person] : oriented to person, place, and time [IV] : IV [Normal Oropharynx] : abnormal oropharynx

## 2021-06-29 NOTE — REVIEW OF SYSTEMS
[EDS: ESS=____] : daytime somnolence: ESS=[unfilled] [Fatigue] : fatigue [Snoring] : snoring [Witnessed Apneas] : witnessed apnea [A.M. Dry Mouth] : a.m. dry mouth [Negative] : Psychiatric [Edema] : ~T edema was present [CHF] : congestive heart failure [Obesity] : obesity [Heartburn] : heartburn [Nocturia] : nocturia [Arthralgias] : arthralgias [Difficulty Maintaining Sleep] : difficulty maintaining sleep [Nasal Congestion] : no nasal congestion [Difficulty Initiating Sleep] : no difficulty falling asleep [Lower Extremity Discomfort] : no lower extremity discomfort [Unusual Sleep Behavior] : no unusual sleep behavior [Cataplexy] :  no cataplexy

## 2021-07-11 NOTE — ASSESSMENT
[FreeTextEntry1] : LINDA BRICE  is a 76 year old F with s/p CAD, s/p CABG ( closure of graft but good collateral circulation)   presented today for  BP check and f/u after recent stent insertion at [facility].\par \par #HTN/CAD, s/p CABG, leg edema\par Reviewed document from [facility] on 4/27/21 which will be scanned in.\par : LV EF 50%, LIMA graft to LAD and Saphenous vein graft to CIRC are patent. Successful PCI with drug eluting stent of CIRC( first Diagonal lesion). Continue uninterrupted dual anti-platelet therapy.\par \par /80\par No CP., radiating arm pain, SOB at rest  except SOB on exertion like walking.\par Rt>> Lt pitting edema on ankle, lower extremities.\par Discussed on Skin care including moisturizing at legs for venous insufficiency. No infection sign of legs.\par \par Started Furosemide 20mg po qd in AM for edema. Fall prevention was discussed.\par Continue Aspirin 81mg qd, Metoprolol 100mg qd, NG 0.4mg SL for CP prn, Ranozoline 500mg po q 12hr for angina, and new med Prasugrel 5mg po qd  after stent insertion.\par Referred to Sleep study for the possible CPAP use.\par Pt will contact Cardiology for earlier follow up for leg swelling and pharmacy issue on med.\par \par #postnasal dripping\par Pt did not start Singulair 10mg po qd by [facility] yet. \par Dr. Kuhn recommended to try nasal Flonase spray instead to see it works. \par \par \par f/i 1 month later for leg edema.

## 2021-07-11 NOTE — END OF VISIT
[FreeTextEntry3] : Patient seen and examined in conjunction with Yamini Quinones. NP acting as practitioner and scribe.  I agree with the history and plan as outlined with modifications documented as appropriate.\par

## 2021-07-11 NOTE — HISTORY OF PRESENT ILLNESS
[FreeTextEntry1] : f/u BP [de-identified] : LINDA BRICE  is a 76 year old F with s/p CAD, s/p CABG ( closure of graft but good collateral circulation)   presented today for  BP check and f/u after recent stent insertion at [facility] on 4/27/21 for unstable angina with CP, SOB in resting time. Recent condition has improved but still had SOB on exertion like walking on the ground but no CP, no arm radiating pain. No bleeding. No need oxygen at home. She's waiting for cardiac rehab by [facility]. She had hx TYLOR without CPAP. Mildly anxious for her medical conditions. Pt brought in [facility]'s cardiac cath document. \par

## 2021-07-11 NOTE — REVIEW OF SYSTEMS
[Fever] : no fever [Chills] : no chills [Chest Pain] : no chest pain [Palpitations] : no palpitations [Lower Ext Edema] : no lower extremity edema [Shortness Of Breath] : no shortness of breath [Wheezing] : no wheezing [Abdominal Pain] : no abdominal pain [Nausea] : no nausea [Diarrhea] : diarrhea [Dysuria] : no dysuria [Joint Pain] : no joint pain [Itching] : no itching [Headache] : no headache [Depression] : no depression [Easy Bleeding] : no easy bleeding [FreeTextEntry5] : Rt>>Lt lower ankles pitting edema

## 2021-07-11 NOTE — PHYSICAL EXAM
[No Acute Distress] : no acute distress [Normal Sclera/Conjunctiva] : normal sclera/conjunctiva [Normal Outer Ear/Nose] : the outer ears and nose were normal in appearance [No JVD] : no jugular venous distention [No Respiratory Distress] : no respiratory distress  [No Accessory Muscle Use] : no accessory muscle use [Normal Rate] : normal rate  ASA of 4, 4E, 5 or 5E [Regular Rhythm] : with a regular rhythm [Normal S1, S2] : normal S1 and S2 [Soft] : abdomen soft [No Joint Swelling] : no joint swelling [No Rash] : no rash [Coordination Grossly Intact] : coordination grossly intact [Normal] : affect was normal and insight and judgment were intact [No Murmur] : no murmur heard [de-identified] : rt leg pitting edema 3+, no skin break, discoloration of left ankle and left lower leg [de-identified] : obese distension [de-identified] : Rt lower leg erythema, pitting edema(Rt>>Lt), s/p CABG grafting from Rt leg ASA of 4, 4E, 5 or 5E ASA of 4, 4E, 5 or 5E ASA of 4, 4E, 5 or 5E

## 2021-07-24 ENCOUNTER — OUTPATIENT (OUTPATIENT)
Dept: OUTPATIENT SERVICES | Facility: HOSPITAL | Age: 76
LOS: 1 days | End: 2021-07-24
Payer: MEDICARE

## 2021-07-24 ENCOUNTER — APPOINTMENT (OUTPATIENT)
Dept: SLEEP CENTER | Facility: CLINIC | Age: 76
End: 2021-07-24
Payer: MEDICARE

## 2021-07-24 DIAGNOSIS — Z90.710 ACQUIRED ABSENCE OF BOTH CERVIX AND UTERUS: Chronic | ICD-10-CM

## 2021-07-24 DIAGNOSIS — N81.4 UTEROVAGINAL PROLAPSE, UNSPECIFIED: Chronic | ICD-10-CM

## 2021-07-24 DIAGNOSIS — Z90.49 ACQUIRED ABSENCE OF OTHER SPECIFIED PARTS OF DIGESTIVE TRACT: Chronic | ICD-10-CM

## 2021-07-24 DIAGNOSIS — Z98.49 CATARACT EXTRACTION STATUS, UNSPECIFIED EYE: Chronic | ICD-10-CM

## 2021-07-24 DIAGNOSIS — Z98.890 OTHER SPECIFIED POSTPROCEDURAL STATES: Chronic | ICD-10-CM

## 2021-07-24 DIAGNOSIS — Z95.1 PRESENCE OF AORTOCORONARY BYPASS GRAFT: Chronic | ICD-10-CM

## 2021-07-24 PROCEDURE — 95810 POLYSOM 6/> YRS 4/> PARAM: CPT | Mod: 26

## 2021-07-24 PROCEDURE — 95810 POLYSOM 6/> YRS 4/> PARAM: CPT

## 2021-07-26 DIAGNOSIS — G47.33 OBSTRUCTIVE SLEEP APNEA (ADULT) (PEDIATRIC): ICD-10-CM

## 2021-07-30 ENCOUNTER — INPATIENT (INPATIENT)
Facility: HOSPITAL | Age: 76
LOS: 7 days | Discharge: HOME HEALTH | DRG: 690 | End: 2021-08-07
Attending: INTERNAL MEDICINE | Admitting: INTERNAL MEDICINE
Payer: MEDICARE

## 2021-07-30 VITALS
RESPIRATION RATE: 18 BRPM | DIASTOLIC BLOOD PRESSURE: 76 MMHG | HEIGHT: 63 IN | WEIGHT: 261.91 LBS | HEART RATE: 75 BPM | OXYGEN SATURATION: 99 % | TEMPERATURE: 98 F | SYSTOLIC BLOOD PRESSURE: 161 MMHG

## 2021-07-30 DIAGNOSIS — Z98.890 OTHER SPECIFIED POSTPROCEDURAL STATES: Chronic | ICD-10-CM

## 2021-07-30 DIAGNOSIS — Z90.49 ACQUIRED ABSENCE OF OTHER SPECIFIED PARTS OF DIGESTIVE TRACT: Chronic | ICD-10-CM

## 2021-07-30 DIAGNOSIS — N81.4 UTEROVAGINAL PROLAPSE, UNSPECIFIED: Chronic | ICD-10-CM

## 2021-07-30 DIAGNOSIS — Z90.710 ACQUIRED ABSENCE OF BOTH CERVIX AND UTERUS: Chronic | ICD-10-CM

## 2021-07-30 DIAGNOSIS — Z98.49 CATARACT EXTRACTION STATUS, UNSPECIFIED EYE: Chronic | ICD-10-CM

## 2021-07-30 DIAGNOSIS — Z95.1 PRESENCE OF AORTOCORONARY BYPASS GRAFT: Chronic | ICD-10-CM

## 2021-07-30 LAB
ALBUMIN SERPL ELPH-MCNC: 3.3 G/DL — SIGNIFICANT CHANGE UP (ref 3.3–5)
ALP SERPL-CCNC: 81 U/L — SIGNIFICANT CHANGE UP (ref 40–120)
ALT FLD-CCNC: 11 U/L — SIGNIFICANT CHANGE UP (ref 10–45)
ANION GAP SERPL CALC-SCNC: 16 MMOL/L — SIGNIFICANT CHANGE UP (ref 5–17)
ANION GAP SERPL CALC-SCNC: 17 MMOL/L — SIGNIFICANT CHANGE UP (ref 5–17)
APPEARANCE UR: ABNORMAL
AST SERPL-CCNC: 25 U/L — SIGNIFICANT CHANGE UP (ref 10–40)
BACTERIA # UR AUTO: ABNORMAL
BASOPHILS # BLD AUTO: 0.03 K/UL — SIGNIFICANT CHANGE UP (ref 0–0.2)
BASOPHILS NFR BLD AUTO: 0.4 % — SIGNIFICANT CHANGE UP (ref 0–2)
BILIRUB SERPL-MCNC: 0.3 MG/DL — SIGNIFICANT CHANGE UP (ref 0.2–1.2)
BILIRUB UR-MCNC: NEGATIVE — SIGNIFICANT CHANGE UP
BUN SERPL-MCNC: 16 MG/DL — SIGNIFICANT CHANGE UP (ref 7–23)
BUN SERPL-MCNC: 18 MG/DL — SIGNIFICANT CHANGE UP (ref 7–23)
CALCIUM SERPL-MCNC: 8.2 MG/DL — LOW (ref 8.4–10.5)
CALCIUM SERPL-MCNC: 9.6 MG/DL — SIGNIFICANT CHANGE UP (ref 8.4–10.5)
CHLORIDE SERPL-SCNC: 101 MMOL/L — SIGNIFICANT CHANGE UP (ref 96–108)
CHLORIDE SERPL-SCNC: 106 MMOL/L — SIGNIFICANT CHANGE UP (ref 96–108)
CO2 SERPL-SCNC: 18 MMOL/L — LOW (ref 22–31)
CO2 SERPL-SCNC: 24 MMOL/L — SIGNIFICANT CHANGE UP (ref 22–31)
COLOR SPEC: SIGNIFICANT CHANGE UP
CREAT SERPL-MCNC: 0.77 MG/DL — SIGNIFICANT CHANGE UP (ref 0.5–1.3)
CREAT SERPL-MCNC: 0.81 MG/DL — SIGNIFICANT CHANGE UP (ref 0.5–1.3)
DIFF PNL FLD: ABNORMAL
EOSINOPHIL # BLD AUTO: 0.07 K/UL — SIGNIFICANT CHANGE UP (ref 0–0.5)
EOSINOPHIL NFR BLD AUTO: 0.8 % — SIGNIFICANT CHANGE UP (ref 0–6)
EPI CELLS # UR: 1 — SIGNIFICANT CHANGE UP
GLUCOSE SERPL-MCNC: 106 MG/DL — HIGH (ref 70–99)
GLUCOSE SERPL-MCNC: 93 MG/DL — SIGNIFICANT CHANGE UP (ref 70–99)
GLUCOSE UR QL: NEGATIVE — SIGNIFICANT CHANGE UP
HCT VFR BLD CALC: 38.8 % — SIGNIFICANT CHANGE UP (ref 34.5–45)
HGB BLD-MCNC: 12.1 G/DL — SIGNIFICANT CHANGE UP (ref 11.5–15.5)
HYALINE CASTS # UR AUTO: 0 /LPF — SIGNIFICANT CHANGE UP (ref 0–7)
IMM GRANULOCYTES NFR BLD AUTO: 0.4 % — SIGNIFICANT CHANGE UP (ref 0–1.5)
KETONES UR-MCNC: NEGATIVE — SIGNIFICANT CHANGE UP
LEUKOCYTE ESTERASE UR-ACNC: ABNORMAL
LYMPHOCYTES # BLD AUTO: 1.51 K/UL — SIGNIFICANT CHANGE UP (ref 1–3.3)
LYMPHOCYTES # BLD AUTO: 17.9 % — SIGNIFICANT CHANGE UP (ref 13–44)
MCHC RBC-ENTMCNC: 30.4 PG — SIGNIFICANT CHANGE UP (ref 27–34)
MCHC RBC-ENTMCNC: 31.2 GM/DL — LOW (ref 32–36)
MCV RBC AUTO: 97.5 FL — SIGNIFICANT CHANGE UP (ref 80–100)
MONOCYTES # BLD AUTO: 0.84 K/UL — SIGNIFICANT CHANGE UP (ref 0–0.9)
MONOCYTES NFR BLD AUTO: 9.9 % — SIGNIFICANT CHANGE UP (ref 2–14)
NEUTROPHILS # BLD AUTO: 5.97 K/UL — SIGNIFICANT CHANGE UP (ref 1.8–7.4)
NEUTROPHILS NFR BLD AUTO: 70.6 % — SIGNIFICANT CHANGE UP (ref 43–77)
NITRITE UR-MCNC: POSITIVE
NRBC # BLD: 0 /100 WBCS — SIGNIFICANT CHANGE UP (ref 0–0)
PH UR: 6.5 — SIGNIFICANT CHANGE UP (ref 5–8)
PLATELET # BLD AUTO: 298 K/UL — SIGNIFICANT CHANGE UP (ref 150–400)
POTASSIUM SERPL-MCNC: 3.3 MMOL/L — LOW (ref 3.5–5.3)
POTASSIUM SERPL-MCNC: 4.6 MMOL/L — SIGNIFICANT CHANGE UP (ref 3.5–5.3)
POTASSIUM SERPL-SCNC: 3.3 MMOL/L — LOW (ref 3.5–5.3)
POTASSIUM SERPL-SCNC: 4.6 MMOL/L — SIGNIFICANT CHANGE UP (ref 3.5–5.3)
PROT SERPL-MCNC: 7.2 G/DL — SIGNIFICANT CHANGE UP (ref 6–8.3)
PROT UR-MCNC: ABNORMAL
RBC # BLD: 3.98 M/UL — SIGNIFICANT CHANGE UP (ref 3.8–5.2)
RBC # FLD: 14 % — SIGNIFICANT CHANGE UP (ref 10.3–14.5)
RBC CASTS # UR COMP ASSIST: >50 /HPF — HIGH (ref 0–4)
SODIUM SERPL-SCNC: 136 MMOL/L — SIGNIFICANT CHANGE UP (ref 135–145)
SODIUM SERPL-SCNC: 146 MMOL/L — HIGH (ref 135–145)
SP GR SPEC: 1.02 — SIGNIFICANT CHANGE UP (ref 1.01–1.02)
UROBILINOGEN FLD QL: NEGATIVE — SIGNIFICANT CHANGE UP
WBC # BLD: 8.45 K/UL — SIGNIFICANT CHANGE UP (ref 3.8–10.5)
WBC # FLD AUTO: 8.45 K/UL — SIGNIFICANT CHANGE UP (ref 3.8–10.5)
WBC UR QL: >50 /HPF — HIGH (ref 0–5)

## 2021-07-30 PROCEDURE — 74176 CT ABD & PELVIS W/O CONTRAST: CPT | Mod: 26,MA

## 2021-07-30 RX ORDER — SODIUM CHLORIDE 9 MG/ML
1000 INJECTION, SOLUTION INTRAVENOUS ONCE
Refills: 0 | Status: COMPLETED | OUTPATIENT
Start: 2021-07-30 | End: 2021-07-30

## 2021-07-30 RX ORDER — MORPHINE SULFATE 50 MG/1
4 CAPSULE, EXTENDED RELEASE ORAL ONCE
Refills: 0 | Status: DISCONTINUED | OUTPATIENT
Start: 2021-07-30 | End: 2021-07-30

## 2021-07-30 RX ORDER — TAMSULOSIN HYDROCHLORIDE 0.4 MG/1
0.4 CAPSULE ORAL AT BEDTIME
Refills: 0 | Status: DISCONTINUED | OUTPATIENT
Start: 2021-07-30 | End: 2021-08-07

## 2021-07-30 RX ORDER — ONDANSETRON 8 MG/1
4 TABLET, FILM COATED ORAL ONCE
Refills: 0 | Status: DISCONTINUED | OUTPATIENT
Start: 2021-07-30 | End: 2021-07-30

## 2021-07-30 RX ORDER — CEFTRIAXONE 500 MG/1
1000 INJECTION, POWDER, FOR SOLUTION INTRAMUSCULAR; INTRAVENOUS ONCE
Refills: 0 | Status: COMPLETED | OUTPATIENT
Start: 2021-07-30 | End: 2021-07-30

## 2021-07-30 RX ORDER — KETOROLAC TROMETHAMINE 30 MG/ML
15 SYRINGE (ML) INJECTION ONCE
Refills: 0 | Status: DISCONTINUED | OUTPATIENT
Start: 2021-07-30 | End: 2021-07-30

## 2021-07-30 RX ORDER — ACETAMINOPHEN 500 MG
975 TABLET ORAL ONCE
Refills: 0 | Status: COMPLETED | OUTPATIENT
Start: 2021-07-30 | End: 2021-07-30

## 2021-07-30 RX ADMIN — SODIUM CHLORIDE 1000 MILLILITER(S): 9 INJECTION, SOLUTION INTRAVENOUS at 17:01

## 2021-07-30 RX ADMIN — SODIUM CHLORIDE 1000 MILLILITER(S): 9 INJECTION, SOLUTION INTRAVENOUS at 22:08

## 2021-07-30 RX ADMIN — MORPHINE SULFATE 4 MILLIGRAM(S): 50 CAPSULE, EXTENDED RELEASE ORAL at 17:01

## 2021-07-30 RX ADMIN — SODIUM CHLORIDE 1000 MILLILITER(S): 9 INJECTION, SOLUTION INTRAVENOUS at 21:08

## 2021-07-30 RX ADMIN — Medication 975 MILLIGRAM(S): at 17:01

## 2021-07-30 RX ADMIN — CEFTRIAXONE 1000 MILLIGRAM(S): 500 INJECTION, POWDER, FOR SOLUTION INTRAMUSCULAR; INTRAVENOUS at 23:01

## 2021-07-30 RX ADMIN — CEFTRIAXONE 100 MILLIGRAM(S): 500 INJECTION, POWDER, FOR SOLUTION INTRAMUSCULAR; INTRAVENOUS at 22:31

## 2021-07-30 RX ADMIN — TAMSULOSIN HYDROCHLORIDE 0.4 MILLIGRAM(S): 0.4 CAPSULE ORAL at 19:36

## 2021-07-30 NOTE — ED PROVIDER NOTE - OBJECTIVE STATEMENT
76F with h/o Nephrolithiasis s/p lithotripsy x3 s/p nephrostomy, CAD s/p CABGx4, HTN, Chronic bronchitis, emphysema, TYLOR, spinal stenosis, Cervical Ca s/p ROBERTO, s/p Appendectomy, s/p cholecystectomy, s/p left lumpectomy, p/w 4 hours of left flank pain. Pain began suddenly in left flank, feels achey and comes in waves, 10/10 in severity, does not radiate, is associated with nausea but no vomiting, and feels similar to previous kidney stones. Currently asymptomatic in ED. Denies dysuria, hematuria, fever/chills, trauma, CP, SOB, Dizziness.

## 2021-07-30 NOTE — CONSULT NOTE ADULT - SUBJECTIVE AND OBJECTIVE BOX
HPI:  Patient is a 76y Female with PMH of CAD s/p CABG x 4, HTN, chronic bronchitis, emphysema, TYLOR, spinal stenosis, cervical CA s/p ROBERTO, nephrolithiasis, s/p multiple ESWL and R PCN in 2018 who presented with L flank pain that started few hours ago. Pain is described as severe ache, does not radiate, no alleviating or aggravating factors, associated with nausea and dry heaving. Patient reports seeing blood in the urine while in the ED. Patient denies fever, chills, burning or pain with urination, HA, dizziness, SOB, chest pain, palpitations, pain in her calves.     PAST MEDICAL & SURGICAL HISTORY:  Malignant neoplasm of cervix, unspecified site  s/p ROBERTO     Smoker  1/2 pack X 55 years    HTN (hypertension)  diagnosed 10/2017    Emphysema lung  mild, on CT scan     Proteinuria    Renal calculus, bilateral    Morbid obesity with BMI of 40.0-44.9, adult    TYLOR (obstructive sleep apnea)  as per [facility] criteria    Lumbar herniated disc    Spinal stenosis of lumbar region    CAD (coronary artery disease)    Chronic bronchitis    Cataract    History of appendectomy      H/O abdominal hysterectomy  1978    H/O lithotripsy  ESWL  left,  right, 2007 left    S/P breast lumpectomy  left, benign    Prolapse, uterovaginal  s/p TVT Sling     S/P cholecystectomy      S/P ERCP  2006    S/P cataract surgery  bilateral, 2015    S/P CABG x 4      MEDICATIONS  (STANDING):  tamsulosin 0.4 milliGRAM(s) Oral at bedtime    MEDICATIONS  (PRN):    FAMILY HISTORY:    Allergies    No Known Allergies    Intolerances      SOCIAL HISTORY:   Tobacco hx:    REVIEW OF SYSTEMS: Pertinent positives and negatives as stated in HPI, otherwise negative    Vital signs  T(C): 36.7, Max: 36.7 ( @ 19:39)  HR: 67  BP: 122/78  SpO2: 99%    Output    UOP    Physical Exam  Gen: NAD  Pulm: No respiratory distress, no subcostal retractions  CV: RRR, no JVD  Abd: Soft, NT, ND  : No CVA tenderness b/l   MSK: No edema present    LABS:     @ 17:02    WBC 8.45  / Hct 38.8  / SCr 0.81         136  |  101  |  18  ----------------------------<  93  4.6   |  18<L>  |  0.81    Ca    9.6      2021 17:02    TPro  7.2  /  Alb  3.3  /  TBili  0.3  /  DBili  x   /  AST  25  /  ALT  11  /  AlkPhos  81        Urinalysis Basic - ( 2021 21:41 )    Color: BROWN / Appearance: Turbid / S.018 / pH: x  Gluc: x / Ketone: Negative  / Bili: Negative / Urobili: Negative   Blood: x / Protein: 100 mg/dL / Nitrite: Positive   Leuk Esterase: Large / RBC: >50 /hpf / WBC >50 /HPF   Sq Epi: x / Non Sq Epi: 1 / Bacteria: Many            Urine Cx:   Blood Cx:    RADIOLOGY:  < from: CT Abdomen and Pelvis No Cont (21 @ 17:47) >  Moderate left hydroureteronephrosis secondary to a 0.7 cm calculus in the mid pelvic left ureteral segment. Additional renal calculi are identified on the left measuring up to 1.6 cm. Multiple right renal calculi identified measuring up to 9 mm. There is no evidence for right-sided hydronephrosis.    < end of copied text >

## 2021-07-30 NOTE — ED PROVIDER NOTE - ATTENDING CONTRIBUTION TO CARE
Attending MD Todd:  I personally have seen and examined this patient.  Resident note reviewed and agree on plan of care and except where noted.  See HPI, PE, and MDM for details.

## 2021-07-30 NOTE — ED ADULT NURSE NOTE - OBJECTIVE STATEMENT
75 Yo female PMHX CAD s/p stents, CABG with 4 vessels involved, multiple kidney stones, both sides requiring multiple lithotripsies c/o L-flank pain. Patient reports that pain began at approx 1200, reports that pain is similar to past kidney stones, today with associated nausea. Patient A&Ox4, denies chest pain, SOB, HA, abdominal pain, fever/chills, urinary symptoms, hematuria, weakness, dizziness, numbness, tinging. Peripheral pulses present b/l. Skin warm, dry and pink. Pt placed in position of comfort. Pt educated on call bell system and provided call bell. Bed in lowest position, wheels locked, appropriate side rails raised. Pt denies needs at this time.

## 2021-07-30 NOTE — ED ADULT NURSE NOTE - URINE CHARACTERISTICS
After Visit Summary   8/11/2017    Lena Morrow    MRN: [id number]           Patient Information     Date Of Birth          1976        Visit Information        Provider Department      8/11/2017 3:00 PM Yesika Templeton PA-C [facility]        Today's Diagnoses     Kidney stones          Care Instructions    I will review images with Dr. Kimbrough and call you on Monday with recommendations.     Continue Flomax and start Miralax daily while on pain meds.               Follow-ups after your visit        Who to contact     If you have questions or need follow up information about today's clinic visit or your schedule please contact [facility] directly at [phone].  Normal or non-critical lab and imaging results will be communicated to you by baseclickhart, letter or phone within 4 business days after the clinic has received the results. If you do not hear from us within 7 days, please contact the clinic through baseclickhart or phone. If you have a critical or abnormal lab result, we will notify you by phone as soon as possible.  Submit refill requests through [employer] or call your pharmacy and they will forward the refill request to us. Please allow 3 business days for your refill to be completed.          Additional Information About Your Visit        MyChart Information     [employer] gives you secure access to your electronic health record. If you see a primary care provider, you can also send messages to your care team and make appointments. If you have questions, please call your primary care clinic.  If you do not have a primary care provider, please call [phone] and they will assist you.        Care EveryWhere ID     This is your Care EveryWhere ID. This could be used by other organizations to access your [place] medical records  [id number]        Your Vitals Were     Pulse Height Last Period BMI (Body Mass Index)  "         76 1.638 m (5' 4.5\") 12/10/2015 30.93 kg/m2         Blood Pressure from Last 3 Encounters:   08/11/17 118/78   07/26/17 (!) 130/100   07/17/17 120/70    Weight from Last 3 Encounters:   08/11/17 83 kg (183 lb)   07/26/17 83 kg (183 lb)   07/17/17 83.1 kg (183 lb 4.8 oz)              We Performed the Following     UA without Microscopic [[id number]]          Today's Medication Changes          These changes are accurate as of: 8/11/17  3:41 PM.  If you have any questions, ask your nurse or doctor.               These medicines have changed or have updated prescriptions.        Dose/Directions    * oxyCODONE-acetaminophen 5-325 MG per tablet   Commonly known as:  PERCOCET   This may have changed:  Another medication with the same name was added. Make sure you understand how and when to take each.   Used for:  Kidney stones   Changed by:  Yesika Templeton PA-C        Dose:  1-2 tablet   Take 1-2 tablets by mouth every 4 hours as needed for pain   Quantity:  15 tablet   Refills:  0       * oxyCODONE-acetaminophen 5-325 MG per tablet   Commonly known as:  PERCOCET   This may have changed:  You were already taking a medication with the same name, and this prescription was added. Make sure you understand how and when to take each.   Used for:  Kidney stones   Changed by:  Yesika Templeton PA-C        Dose:  1-2 tablet   Take 1-2 tablets by mouth every 4 hours as needed for pain   Quantity:  20 tablet   Refills:  0       * Notice:  This list has 2 medication(s) that are the same as other medications prescribed for you. Read the directions carefully, and ask your doctor or other care provider to review them with you.         Where to get your medicines      These [facility] [address] AT [facility] [address] [facility] [address]     Phone:  [phone]     tamsulosin 0.4 MG capsule         Some of these will need a " paper prescription and others can be bought over the counter.  Ask your nurse if you have questions.     Bring a paper prescription for each of these medications     oxyCODONE-acetaminophen 5-325 MG per tablet                Primary Care Provider Office Phone # Fax #    Trevon Crisostomo [phone]       [address] [facility] [address]        Equal Access to Services     PATRICIA MAURICIO : Hadii aad ku hadasho Soomaali, waaxda luqadaha, qaybta kaalmada adeegyada, waxay idiin hayaan adeeg kharash la'power . So [facility] [phone].    ATENCIÓN: Si habla español, tiene a manriquez disposición servicios gratuitos de asistencia lingüística. Liame al [phone].    We comply with applicable federal civil rights laws and [place] laws. We do not discriminate on the basis of race, color, national origin, age, disability sex, sexual orientation or gender identity.            Thank you!     Thank you for choosing [facility] [place]  for your care. Our goal is always to provide you with excellent care. Hearing back from our patients is one way we can continue to improve our services. Please take a few minutes to complete the written survey that you may receive in the mail after your visit with us. Thank you!             Your Updated Medication List - Protect others around you: Learn how to safely use, store and throw away your medicines at www.disposemymeds.org.          This list is accurate as of: 8/11/17  3:41 PM.  Always use your most recent med list.                   Brand Name Dispense Instructions for use Diagnosis    albuterol 108 (90 BASE) MCG/ACT Inhaler    PROAIR HFA/PROVENTIL HFA/VENTOLIN HFA    1 Inhaler    Inhale 2 puffs into the lungs every 6 hours as needed for shortness of breath / dyspnea or wheezing    Mild intermittent asthma with acute exacerbation       * buPROPion 300 MG 24 hr tablet    WELLBUTRIN XL    30 tablet    Take one 300 mg tablet along with 150  mg tablet to make 450 mg per day.    Major depressive disorder, recurrent episode, moderate (H)       * buPROPion 150 MG 24 hr tablet    WELLBUTRIN XL    30 tablet    Take one 150 mg tablet along with one 300 mg tablet to make 450 mg per day.    Major depressive disorder, recurrent episode, moderate (H)       FISH OIL PO           ibuprofen 600 MG tablet    ADVIL/MOTRIN    90 tablet    Take 1 tablet (600 mg) by mouth every 6 hours as needed for pain (mild)    S/P laparoscopic hysterectomy, Endometriosis, Pelvic pain in female       lamoTRIgine 200 MG tablet    LAMICTAL    180 tablet    Take 1 tablet (200 mg) by mouth 2 times daily    Bipolar 2 disorder (H)       LORATADINE PO      Take 10 mg by mouth daily        magnesium citrate solution      Take 296 mLs by mouth once        MUCINEX SINUS-MAX PO      Take 1,200 mg by mouth daily        NEXIUM PO      Take by mouth daily        OXYCODONE HCL PO      Take 5-10 mg by mouth every 3 hours as needed        * oxyCODONE-acetaminophen 5-325 MG per tablet    PERCOCET    15 tablet    Take 1-2 tablets by mouth every 4 hours as needed for pain    Kidney stones       * oxyCODONE-acetaminophen 5-325 MG per tablet    PERCOCET    20 tablet    Take 1-2 tablets by mouth every 4 hours as needed for pain    Kidney stones       prenatal multivitamin plus iron 27-0.8 MG Tabs per tablet      Take 1 tablet by mouth daily        propranolol 20 MG tablet    INDERAL    90 tablet    Take 1-2 tablets as needed for anxiety (up to twice a day)    Generalized anxiety disorder       ROBAXIN PO      Take 750 mg by mouth 4 times daily as needed for muscle spasms        SUPER B COMPLEX PO           tamsulosin 0.4 MG capsule    FLOMAX    21 capsule    Take 1 capsule (0.4 mg) by mouth daily    Kidney stones       VALTREX PO      Take 500 mg by mouth 2 times daily as needed (herpes outbreak)        VITAMIN D (CHOLECALCIFEROL) PO      Take 5,000 Units by mouth daily        * Notice:  This list has 4  medication(s) that are the same as other medications prescribed for you. Read the directions carefully, and ask your doctor or other care provider to review them with you.       clear

## 2021-07-30 NOTE — ED ADULT NURSE NOTE - NSHOSCREENINGQ1_ED_ALL_ED
Faxed back  
Reason for Call:  Form, our goal is to have forms completed with 72 hours, however, some forms may require a visit or additional information.    Type of letter, form or note:  medical    Who is the form from?: [facility]    Where did the form come from: form was faxed in    What clinic location was the form placed at?: [facility]    Where the form was placed: Dr's Box: Leo Melgar MD    What number is listed as a contact on the form?: Fax # [phone]       Additional comments: Labs (8/3/17) & Vitals (4-7/17-7/28/17)    Call taken on 8/4/2017 at 9:47 AM by Steven Barlow      
No

## 2021-07-30 NOTE — ED PROVIDER NOTE - PHYSICAL EXAMINATION
GENERAL: well appearing in no acute distress, non-toxic appearing  HEAD: normocephalic, atraumatic  HEENT: normal conjunctiva, oral mucosa moist, uvula midline, no tonsilar exudates, neck supple, no JVD  CARDIAC: regular rate and rhythm, normal S1S2, no appreciable murmurs, 2+ pulses in UE/LE b/l  PULM: normal breath sounds, clear to ascultation bilaterally, no rales, rhonchi, wheezing  GI: abdomen nondistended, soft, mild left flank pain   : + Left CVA tenderness, no suprapubic tenderness  NEURO: no focal motor or sensory deficits, CN2-12 intact, normal speech, PERRLA, EOMI, normal gait, AAOx3  MSK: no peripheral edema, no calf tenderness b/l  SKIN: well-perfused, extremities warm, no visible rashes  PSYCH: appropriate mood and affect

## 2021-07-30 NOTE — ED PROVIDER NOTE - CLINICAL SUMMARY MEDICAL DECISION MAKING FREE TEXT BOX
76F with h/o multiple kidney stones, s/p lithotripsy, s/p nephrostomy, who presents with left flank pain and nausea. No dysuria. Left CVA tenderness. AVSS.  Kidney stone most likely. Will get labs, imaging, and reassess.

## 2021-07-30 NOTE — ED PROVIDER NOTE - PMH
CAD (coronary artery disease)    Cataract    Chronic bronchitis    Emphysema lung  mild, on CT scan 2017  HTN (hypertension)  diagnosed 10/2017  Lumbar herniated disc    Malignant neoplasm of cervix, unspecified site  s/p ROBERTO 1978  Morbid obesity with BMI of 40.0-44.9, adult    TYLOR (obstructive sleep apnea)  as per [facility] criteria  Proteinuria    Renal calculus, bilateral    Smoker  1/2 pack X 55 years  Spinal stenosis of lumbar region

## 2021-07-30 NOTE — ED PROVIDER NOTE - PROGRESS NOTE DETAILS
ED Sign OUt, eval for likely L ureteral stone, pending imaging / close reassessments for tx / dispo decision -- Roberto Olmos MD Pt noted blood in urine upon urination. Pain well controlled. AVSS. Awaiting CT final read

## 2021-07-30 NOTE — ED ADULT NURSE NOTE - PMH
CAD (coronary artery disease)    Cataract    Chronic bronchitis    Emphysema lung  mild, on CT scan 2017  HTN (hypertension)  diagnosed 10/2017  Lumbar herniated disc    Malignant neoplasm of cervix, unspecified site  s/p ROBERTO 1978  Morbid obesity with BMI of 40.0-44.9, adult    TYLOR (obstructive sleep apnea)  as per [facility] criteria  Proteinuria    Renal calculus, bilateral    Smoker  1/2 pack X 55 years  Spinal stenosis of lumbar region

## 2021-07-30 NOTE — CONSULT NOTE ADULT - ASSESSMENT
Patient is a 76y Female with PMH of CAD s/p CABG x 4, HTN, chronic bronchitis, emphysema, TYLOR, spinal stenosis, cervical CA s/p ROBERTO, nephrolithiasis, s/p multiple ESWL and R PCN in 2018 who presented with L flank pain that started few hours ago. Pain is described as severe ache, does not radiate, no alleviating or aggravating factors, associated with nausea and dry heaving. Patient reports seeing blood in the urine while in the ED. Patient denies fever, chills, burning or pain with urination, HA, dizziness, SOB, chest pain, palpitations, pain in her calves. In ED patient is hemodynamically stable, afebrile, with pain resolved.  CT scan showed "Moderate left hydroureteronephrosis secondary to a 0.7 cm calculus in the mid pelvic left ureteral segment"  WBC 8.45  / Hct 38.8  / SCr 0.81, UA- with signs of UTI    Patient received 1g Rocephine, 2L of IVF and flomax in ED.     Full consult note pending discussion with attending    Patient is a 76y Female with PMH of CAD s/p CABG x 4, HTN, chronic bronchitis, emphysema, TYLOR, spinal stenosis, cervical CA s/p ROBERTO, nephrolithiasis, s/p multiple ESWL and R PCN in 2018 who presented with L flank pain that started few hours ago. Pain is described as severe ache, does not radiate, no alleviating or aggravating factors, associated with nausea and dry heaving. Patient reports seeing blood in the urine while in the ED. Patient denies fever, chills, burning or pain with urination, HA, dizziness, SOB, chest pain, palpitations, pain in her calves. In ED patient is hemodynamically stable, afebrile, with pain resolved.  CT scan showed "Moderate left hydroureteronephrosis secondary to a 0.7 cm calculus in the mid pelvic left ureteral segment"  WBC 8.45  / Hct 38.8  / SCr 0.81, UA- with signs of UTI    Patient received 1g Rocephine, 2L of IVF and flomax in ED.     plan:  No surgical interventions at this time   CDU for observation for expulsive therapy   pain management   Flomax  adequate hydration   Labs in AM  send Urine culture   ABX for UTI

## 2021-07-31 DIAGNOSIS — N20.0 CALCULUS OF KIDNEY: ICD-10-CM

## 2021-07-31 DIAGNOSIS — N39.0 URINARY TRACT INFECTION, SITE NOT SPECIFIED: ICD-10-CM

## 2021-07-31 DIAGNOSIS — I25.118 ATHEROSCLEROTIC HEART DISEASE OF NATIVE CORONARY ARTERY WITH OTHER FORMS OF ANGINA PECTORIS: ICD-10-CM

## 2021-07-31 LAB
ANION GAP SERPL CALC-SCNC: 7 MMOL/L — SIGNIFICANT CHANGE UP (ref 5–17)
BASOPHILS # BLD AUTO: 0.02 K/UL — SIGNIFICANT CHANGE UP (ref 0–0.2)
BASOPHILS NFR BLD AUTO: 0.3 % — SIGNIFICANT CHANGE UP (ref 0–2)
BUN SERPL-MCNC: 19 MG/DL — SIGNIFICANT CHANGE UP (ref 7–23)
CALCIUM SERPL-MCNC: 9.4 MG/DL — SIGNIFICANT CHANGE UP (ref 8.4–10.5)
CHLORIDE SERPL-SCNC: 103 MMOL/L — SIGNIFICANT CHANGE UP (ref 96–108)
CO2 SERPL-SCNC: 30 MMOL/L — SIGNIFICANT CHANGE UP (ref 22–31)
CREAT SERPL-MCNC: 0.88 MG/DL — SIGNIFICANT CHANGE UP (ref 0.5–1.3)
EOSINOPHIL # BLD AUTO: 0.1 K/UL — SIGNIFICANT CHANGE UP (ref 0–0.5)
EOSINOPHIL NFR BLD AUTO: 1.5 % — SIGNIFICANT CHANGE UP (ref 0–6)
GLUCOSE SERPL-MCNC: 121 MG/DL — HIGH (ref 70–99)
HCT VFR BLD CALC: 36.1 % — SIGNIFICANT CHANGE UP (ref 34.5–45)
HGB BLD-MCNC: 11.3 G/DL — LOW (ref 11.5–15.5)
IMM GRANULOCYTES NFR BLD AUTO: 0.5 % — SIGNIFICANT CHANGE UP (ref 0–1.5)
LYMPHOCYTES # BLD AUTO: 1.29 K/UL — SIGNIFICANT CHANGE UP (ref 1–3.3)
LYMPHOCYTES # BLD AUTO: 19.6 % — SIGNIFICANT CHANGE UP (ref 13–44)
MCHC RBC-ENTMCNC: 30.5 PG — SIGNIFICANT CHANGE UP (ref 27–34)
MCHC RBC-ENTMCNC: 31.3 GM/DL — LOW (ref 32–36)
MCV RBC AUTO: 97.6 FL — SIGNIFICANT CHANGE UP (ref 80–100)
MONOCYTES # BLD AUTO: 0.53 K/UL — SIGNIFICANT CHANGE UP (ref 0–0.9)
MONOCYTES NFR BLD AUTO: 8 % — SIGNIFICANT CHANGE UP (ref 2–14)
NEUTROPHILS # BLD AUTO: 4.62 K/UL — SIGNIFICANT CHANGE UP (ref 1.8–7.4)
NEUTROPHILS NFR BLD AUTO: 70.1 % — SIGNIFICANT CHANGE UP (ref 43–77)
NRBC # BLD: 0 /100 WBCS — SIGNIFICANT CHANGE UP (ref 0–0)
PLATELET # BLD AUTO: 272 K/UL — SIGNIFICANT CHANGE UP (ref 150–400)
POTASSIUM SERPL-MCNC: 5 MMOL/L — SIGNIFICANT CHANGE UP (ref 3.5–5.3)
POTASSIUM SERPL-SCNC: 5 MMOL/L — SIGNIFICANT CHANGE UP (ref 3.5–5.3)
RBC # BLD: 3.7 M/UL — LOW (ref 3.8–5.2)
RBC # FLD: 13.8 % — SIGNIFICANT CHANGE UP (ref 10.3–14.5)
SARS-COV-2 RNA SPEC QL NAA+PROBE: SIGNIFICANT CHANGE UP
SODIUM SERPL-SCNC: 140 MMOL/L — SIGNIFICANT CHANGE UP (ref 135–145)
WBC # BLD: 6.59 K/UL — SIGNIFICANT CHANGE UP (ref 3.8–10.5)
WBC # FLD AUTO: 6.59 K/UL — SIGNIFICANT CHANGE UP (ref 3.8–10.5)

## 2021-07-31 PROCEDURE — 99222 1ST HOSP IP/OBS MODERATE 55: CPT

## 2021-07-31 PROCEDURE — 71045 X-RAY EXAM CHEST 1 VIEW: CPT | Mod: 26

## 2021-07-31 RX ORDER — FAMOTIDINE 10 MG/ML
1 INJECTION INTRAVENOUS
Qty: 0 | Refills: 0 | DISCHARGE

## 2021-07-31 RX ORDER — ASPIRIN/CALCIUM CARB/MAGNESIUM 324 MG
81 TABLET ORAL DAILY
Refills: 0 | Status: DISCONTINUED | OUTPATIENT
Start: 2021-07-31 | End: 2021-08-01

## 2021-07-31 RX ORDER — SODIUM CHLORIDE 9 MG/ML
1000 INJECTION INTRAMUSCULAR; INTRAVENOUS; SUBCUTANEOUS
Refills: 0 | Status: DISCONTINUED | OUTPATIENT
Start: 2021-07-31 | End: 2021-08-07

## 2021-07-31 RX ORDER — FAMOTIDINE 10 MG/ML
40 INJECTION INTRAVENOUS
Refills: 0 | Status: DISCONTINUED | OUTPATIENT
Start: 2021-07-31 | End: 2021-08-07

## 2021-07-31 RX ORDER — RANOLAZINE 500 MG/1
500 TABLET, FILM COATED, EXTENDED RELEASE ORAL
Refills: 0 | Status: DISCONTINUED | OUTPATIENT
Start: 2021-07-31 | End: 2021-08-07

## 2021-07-31 RX ORDER — ACETAMINOPHEN 500 MG
650 TABLET ORAL EVERY 6 HOURS
Refills: 0 | Status: DISCONTINUED | OUTPATIENT
Start: 2021-07-31 | End: 2021-08-07

## 2021-07-31 RX ORDER — FUROSEMIDE 40 MG
20 TABLET ORAL DAILY
Refills: 0 | Status: DISCONTINUED | OUTPATIENT
Start: 2021-07-31 | End: 2021-08-07

## 2021-07-31 RX ORDER — CEFTRIAXONE 500 MG/1
1000 INJECTION, POWDER, FOR SOLUTION INTRAMUSCULAR; INTRAVENOUS EVERY 12 HOURS
Refills: 0 | Status: DISCONTINUED | OUTPATIENT
Start: 2021-07-31 | End: 2021-08-03

## 2021-07-31 RX ORDER — ATORVASTATIN CALCIUM 80 MG/1
80 TABLET, FILM COATED ORAL AT BEDTIME
Refills: 0 | Status: DISCONTINUED | OUTPATIENT
Start: 2021-07-31 | End: 2021-08-07

## 2021-07-31 RX ORDER — ACETAMINOPHEN 500 MG
650 TABLET ORAL ONCE
Refills: 0 | Status: COMPLETED | OUTPATIENT
Start: 2021-07-31 | End: 2021-07-31

## 2021-07-31 RX ORDER — HEPARIN SODIUM 5000 [USP'U]/ML
5000 INJECTION INTRAVENOUS; SUBCUTANEOUS EVERY 12 HOURS
Refills: 0 | Status: DISCONTINUED | OUTPATIENT
Start: 2021-07-31 | End: 2021-08-07

## 2021-07-31 RX ORDER — PRASUGREL 5 MG/1
5 TABLET, FILM COATED ORAL DAILY
Refills: 0 | Status: DISCONTINUED | OUTPATIENT
Start: 2021-07-31 | End: 2021-08-01

## 2021-07-31 RX ORDER — METOPROLOL TARTRATE 50 MG
100 TABLET ORAL DAILY
Refills: 0 | Status: DISCONTINUED | OUTPATIENT
Start: 2021-07-31 | End: 2021-08-07

## 2021-07-31 RX ADMIN — Medication 650 MILLIGRAM(S): at 05:27

## 2021-07-31 RX ADMIN — CEFTRIAXONE 1000 MILLIGRAM(S): 500 INJECTION, POWDER, FOR SOLUTION INTRAMUSCULAR; INTRAVENOUS at 11:04

## 2021-07-31 RX ADMIN — CEFTRIAXONE 100 MILLIGRAM(S): 500 INJECTION, POWDER, FOR SOLUTION INTRAMUSCULAR; INTRAVENOUS at 22:06

## 2021-07-31 RX ADMIN — ATORVASTATIN CALCIUM 80 MILLIGRAM(S): 80 TABLET, FILM COATED ORAL at 22:05

## 2021-07-31 RX ADMIN — FAMOTIDINE 40 MILLIGRAM(S): 10 INJECTION INTRAVENOUS at 17:33

## 2021-07-31 RX ADMIN — Medication 650 MILLIGRAM(S): at 05:14

## 2021-07-31 RX ADMIN — SODIUM CHLORIDE 75 MILLILITER(S): 9 INJECTION INTRAMUSCULAR; INTRAVENOUS; SUBCUTANEOUS at 04:28

## 2021-07-31 RX ADMIN — RANOLAZINE 500 MILLIGRAM(S): 500 TABLET, FILM COATED, EXTENDED RELEASE ORAL at 17:33

## 2021-07-31 RX ADMIN — TAMSULOSIN HYDROCHLORIDE 0.4 MILLIGRAM(S): 0.4 CAPSULE ORAL at 22:05

## 2021-07-31 RX ADMIN — CEFTRIAXONE 100 MILLIGRAM(S): 500 INJECTION, POWDER, FOR SOLUTION INTRAMUSCULAR; INTRAVENOUS at 10:28

## 2021-07-31 RX ADMIN — HEPARIN SODIUM 5000 UNIT(S): 5000 INJECTION INTRAVENOUS; SUBCUTANEOUS at 17:33

## 2021-07-31 RX ADMIN — Medication 650 MILLIGRAM(S): at 23:47

## 2021-07-31 NOTE — CONSULT NOTE ADULT - SUBJECTIVE AND OBJECTIVE BOX
Patient is a 76y old  Female who presents with a chief complaint of renal colic (31 Jul 2021 09:46)    From HPI" HPI:  76F with h/o Nephrolithiasis s/p lithotripsy x3 s/p nephrostomy, CAD s/p CABGx4, HTN, Chronic bronchitis, emphysema, TYLOR, spinal stenosis, Cervical Ca s/p ROBERTO, s/p Appendectomy, s/p cholecystectomy, s/p left lumpectomy, p/w 4 hours of left flank pain. Pain began suddenly in left flank, feels achey and comes in waves, 10/10 in severity, does not radiate, is associated with nausea but no vomiting, and feels similar to previous kidney stones.  Denies dysuria, hematuria, fever/chills, trauma, CP, SOB, Dizziness. (31 Jul 2021 12:21)  "    Above verified-agree with above unless noted below.  seen by urology      ID consulted     PAST MEDICAL & SURGICAL HISTORY:  Malignant neoplasm of cervix, unspecified site  s/p ROBERTO 1978    Smoker  1/2 pack X 55 years    HTN (hypertension)  diagnosed 10/2017    Emphysema lung  mild, on CT scan 2017    Proteinuria    Renal calculus, bilateral    Morbid obesity with BMI of 40.0-44.9, adult    TYLOR (obstructive sleep apnea)  as per [facility] criteria    Lumbar herniated disc    Spinal stenosis of lumbar region    CAD (coronary artery disease)    Chronic bronchitis    Cataract    History of appendectomy  1954    H/O abdominal hysterectomy  ROBERTO 1978    H/O lithotripsy  ESWL 1986 left, 2004 right, 2007 left    S/P breast lumpectomy  left, benign    Prolapse, uterovaginal  s/p TVT Sling 2000    S/P cholecystectomy  2005    S/P ERCP  2006    S/P cataract surgery  bilateral, 2015    S/P CABG x 4        Social history:    + Smoking,  no   ETOH,      IVDU       FAMILY HISTORY:  No pertinent family history in first degree relatives    - Family history of medical problems in all first degree relatives reviewed.None of these were found to be related to patients current illness.    REVIEW OF SYSTEMS  General:	Denies any malaise fatigue + chills. Fevers absent    Skin:No rash  	  Ophthalmologic:Denies any visual complaints,discharge redness or photophobia  	  ENMT:No nasal discharge,headache,sinus congestion or throat pain.No dental complaints    Respiratory and Thorax:No cough,sputum or chest pain.Denies shortness of breath  	  Cardiovascular:	No chest pain,palpitaions or dizziness    Gastrointestinal:	NO nausea,abdominal pain or diarrhea.    Genitourinary:	+ flank pain  some dysuria    Musculoskeletal:	No joint swelling or pain.No weakness    Neurological:No confusion,diziness.No extremity weakness.No bladder or bowel incontinence	        Hematology/Lymphatics:	No LN swelling.No gum bleeding     Endocrine:	No recent weight gain or loss.No abnormal heat/cold intolerance      Allergies    No Known Allergies    Intolerances        Antimicrobials:    cefTRIAXone   IVPB 1000 milliGRAM(s) IV Intermittent every 12 hours      Prior Antimicrobials:  MEDICATIONS  (STANDING):  cefTRIAXone   IVPB   100 mL/Hr IV Intermittent (07-30-21 @ 22:31)    cefTRIAXone   IVPB   100 mL/Hr IV Intermittent (07-31-21 @ 10:28)      Other medications reviewed      Vital Signs Last 24 Hrs  T(C): 37.3 (31 Jul 2021 12:14), Max: 37.9 (31 Jul 2021 05:00)  T(F): 99.2 (31 Jul 2021 12:14), Max: 100.2 (31 Jul 2021 05:00)  HR: 87 (31 Jul 2021 12:14) (67 - 88)  BP: 146/70 (31 Jul 2021 12:14) (98/63 - 149/74)  BP(mean): --  RR: 18 (31 Jul 2021 05:00) (17 - 18)  SpO2: 96% (31 Jul 2021 12:14) (94% - 99%)    PHYSICAL EXAM:Pleasant patient in no acute distress.      Constitutional:Comfortable.Awake and alert  No cachexia     Eyes:PERRL EOMI.NO discharge or conjunctival injection    ENMT:No sinus tenderness.No thrush.No pharyngeal exudate or erythema.Fair dental hygiene    Neck:Supple,No LN,no JVD      Respiratory:Good air entry bilaterally,coarse BS     Cardiovascular:S1 S2 wnl, No murmurs,rub or gallops    Gastrointestinal:Soft BS(+) no tenderness no masses ,No rebound or guarding    Genitourinary:Left  CVA tendereness         Extremities:No cyanosis,clubbing or edema.    Vascular:peripheral pulses felt    Neurological:AAO X 3,No grossly focal deficits            Musculoskeletal:No joint swelling or LOM              Labs:                            11.3   6.59  )-----------( 272      ( 31 Jul 2021 04:37 )             36.1     WBC Count: 6.59 (07-31-21 @ 04:37)  WBC Count: 8.45 (07-30-21 @ 17:02)      07-31    140  |  103  |  19  ----------------------------<  121<H>  5.0   |  30  |  0.88    Ca    9.4      31 Jul 2021 04:37    TPro  7.2  /  Alb  3.3  /  TBili  0.3  /  DBili  x   /  AST  25  /  ALT  11  /  AlkPhos  81  07-30      Urinalysis + Microscopic Examination (07.30.21 @ 21:41)   Urine Appearance: Turbid   Urobilinogen: Negative   Specific Gravity: 1.018   Protein, Urine: 100 mg/dL   pH Urine: 6.5   Leukocyte Esterase Concentration: Large   Nitrite: Positive   Ketone - Urine: Negative   Bilirubin: Negative   Color: BROWN   Glucose Qualitative, Urine: Negative   Blood, Urine: Moderate   Red Blood Cell - Urine: >50 /hpf   White Blood Cell - Urine: >50 /HPF   Epithelial Cells: 1   Hyaline Casts: 0 /LPF   Bacteria: Many       < from: Xray Chest 1 View- PORTABLE-Urgent (Xray Chest 1 View- PORTABLE-Urgent .) (07.31.21 @ 01:08) >    IMPRESSION:  Clear lungs.    --- End of Report ---    < end of copied text >  < from: CT Abdomen and Pelvis No Cont (07.30.21 @ 17:47) >  IMPRESSION:  Moderate left hydroureteronephrosis secondary to a 0.7 cm calculus in the mid pelvic left ureteral segment. Additional renal calculi are identified on the left measuring up to 1.6 cm. Multiple right renal calculi identified measuring up to 9 mm. There is no evidence for right-sided hydronephrosis.        --- End of Report ---        < end of copied text >        COVID-19 PCR . (07.31.21 @ 10:55)   COVID-19 PCR: NotDetec:Imaging studies(films) were independently reviewed.Findings as detailed in report above

## 2021-07-31 NOTE — H&P ADULT - NSICDXPASTSURGICALHX_GEN_ALL_CORE_FT
PAST SURGICAL HISTORY:  H/O abdominal hysterectomy ROBERTO 1978    H/O lithotripsy ESWL 1986 left, 2004 right, 2007 left    History of appendectomy 1954    Prolapse, uterovaginal s/p TVT Sling 2000    S/P breast lumpectomy left, benign    S/P CABG x 4     S/P cataract surgery bilateral, 2015    S/P cholecystectomy 2005    S/P ERCP 2006

## 2021-07-31 NOTE — H&P ADULT - NSICDXPASTMEDICALHX_GEN_ALL_CORE_FT
PAST MEDICAL HISTORY:  CAD (coronary artery disease)     Cataract     Chronic bronchitis     Emphysema lung mild, on CT scan 2017    HTN (hypertension) diagnosed 10/2017    Lumbar herniated disc     Malignant neoplasm of cervix, unspecified site s/p ROBERTO 1978    Morbid obesity with BMI of 40.0-44.9, adult     TYLOR (obstructive sleep apnea) as per [facility] criteria    Proteinuria     Renal calculus, bilateral     Smoker 1/2 pack X 55 years    Spinal stenosis of lumbar region

## 2021-07-31 NOTE — ED CDU PROVIDER SUBSEQUENT DAY NOTE - ATTENDING CONTRIBUTION TO CARE
Patient seen and examined at bedside. Patient's results reviewed with 7mm stone with UTI. Patient noted to have a temperature of 100.2 this morning. Pain is controlled at this time, no acute complaints. Spoke to urology, will admit patient to monitor. Plan discussed with patient and agreeable.

## 2021-07-31 NOTE — ED CDU PROVIDER SUBSEQUENT DAY NOTE - HISTORY
No interval changes since initial CDU provider note. Pt feels well without complaint. NAD. Plan to continue pain control and repeat labs in the setting of renal colic. Urology following. - DEEPA Vásquez

## 2021-07-31 NOTE — CONSULT NOTE ADULT - ASSESSMENT
76F with h/o Nephrolithiasis s/p lithotripsy x3 s/p nephrostomy, CAD s/p CABGx4, HTN, Chronic bronchitis, emphysema, TYLOR, spinal stenosis, Cervical Ca s/p ROBERTO, s/p Appendectomy, s/p cholecystectomy, s/p left lumpectomy, p/w 4 hours of left flank pain. Pain began suddenly in left flank, feels achey and comes in waves, 10/10 in severity, does not radiate, is associated with nausea but no vomiting, and feels similar to previous kidney stones.   CT with partially obstructing erica  seen by urology  observation  denies dysuria but pyuria  no fevers chills or s/s dissemination  No recent abx    rec:  A) Nephrolithiasis  Pyuria  ? UTI  ? Reactive  follow urine cx  check blood Cx  continue ceftriaxone  Intervention/stone plan per urology    B) CABG, COPD  will defer to primary  pt not vaccinated for COVID  vaccine benefits/risks/adverse effects ,covid risk discussed ind etail  Vaccination encouraged  Pt will think about it    Will tailor plan for ID issues  per course,results.Will defer to primary team on management of other issues.  Assessment, plan and recommendations as detailed above were discussed with the medical/primary  team.  Will Follow.  Beeper [phone] [place] [employer] [facility] [id number].   Wknd/afterhours/No [phone] or Fellow on call

## 2021-07-31 NOTE — ED ADULT NURSE REASSESSMENT NOTE - GENERAL PATIENT STATE
comfortable appearance/improvement verbalized/resting/sleeping
comfortable appearance/cooperative/improvement verbalized/resting/sleeping/smiling/interactive

## 2021-07-31 NOTE — ED CDU PROVIDER SUBSEQUENT DAY NOTE - PROGRESS NOTE DETAILS
CXR prelim clear. Patient states that she has been urinating. Will give hydration and monitor closely. - Jena Vásquez PA-C Patient febrile. Otherwise vitals stable. Patient appears well. Urology made aware. - Jena Vásquez PA-C Patient seen at bedside in NAD.  VSS.  Patient resting comfortably without complaints. Febrile overnight to 100.2 orally.  Urology recommending admission to medicine for abx and close observation. -Juan Echeverria PA-C

## 2021-07-31 NOTE — ED CDU PROVIDER INITIAL DAY NOTE - NS ED ROS FT
Constitutional: No fever or chills  Eyes: No visual changes  CV: No chest pain or lower extremity edema  Resp: No SOB no cough  GI: +flank pain  + nausea No vomiting. No diarrhea.   : No dysuria, hematuria.   MSK: No musculoskeletal pain  Skin: No rash  Psych: No complaints   Neuro: No headache. No numbness or tingling. No weakness.

## 2021-07-31 NOTE — ED CDU PROVIDER DISPOSITION NOTE - ATTENDING CONTRIBUTION TO CARE
Attending Contribution to Care: Patient seen and examined at bedside. Patient's results reviewed with 7mm stone with UTI. Patient noted to have a temperature of 100.2 this morning. Pain is controlled at this time, no acute complaints. Spoke to urology, will admit patient to monitor. Plan discussed with patient and agreeable. Patient admitted to Dr. Dockery.

## 2021-07-31 NOTE — CONSULT NOTE ADULT - SUBJECTIVE AND OBJECTIVE BOX
HPI  Patient known to practice with history of nephrolithiasis now presented to ed with flank pain and low grade fever.  She has had no pain now for 24 hours and no n/v/ fever or chills.   She is a 75 yo female with  PMH of CAD s/p CABG x 4, HTN, chronic bronchitis, emphysema, TYLOR, spinal stenosis, cervical CA s/p ROBERTO, nephrolithiasis, s/p multiple ESWL and R PCN in 2018 who presented with L flank pain that started few hours ago. Pain is described as severe ache, does not radiate, no alleviating or aggravating factors, associated with nausea and dry heaving. Patient reports seeing blood in the urine while in the ED. Patient denies fever, chills, burning or pain with urination, HA, dizziness, SOB, chest pain, palpitations, pain in her calves    PAST MEDICAL & SURGICAL HISTORY:  Malignant neoplasm of cervix, unspecified site  s/p ROBERTO     Smoker  1/2 pack X 55 years    HTN (hypertension)  diagnosed 10/2017    Emphysema lung  mild, on CT scan     Proteinuria    Renal calculus, bilateral    Morbid obesity with BMI of 40.0-44.9, adult    TYLOR (obstructive sleep apnea)  as per [facility] criteria    Lumbar herniated disc    Spinal stenosis of lumbar region    CAD (coronary artery disease)    Chronic bronchitis    Cataract    History of appendectomy      H/O abdominal hysterectomy  ROBERTO     H/O lithotripsy  [facility]  left,  right, 2007 left    S/P breast lumpectomy  left, benign    Prolapse, uterovaginal  s/p TVT Sling     S/P cholecystectomy  2005    S/P ERCP  2006    S/P cataract surgery  bilateral, 2015    S/P CABG x 4    MEDICATIONS  (STANDING):  cefTRIAXone   IVPB 1000 milliGRAM(s) IV Intermittent every 12 hours  sodium chloride 0.9%. 1000 milliLiter(s) (75 mL/Hr) IV Continuous <Continuous>  tamsulosin 0.4 milliGRAM(s) Oral at bedtime    MEDICATIONS  (PRN):      FAMILY HISTORY:  No pertinent family history in first degree relatives    Allergies    No Known Allergies    SOCIAL HISTORY: no tobacco etoh    REVIEW OF SYSTEMS:gen neg  heent neg neck neg   cv neg   gi neg   gu per hpi  msk neg  neuro neg   psych neg  all neg skin neg    Physical Exam  Vital signs  T(C): 37 (21 @ 07:47), Max: 37.9 (21 @ 05:00)  HR: 84 (21 @ 07:47)  BP: 114/69 (21 @ 07:47)  SpO2: 96% (21 @ 07:47)  Gen:  WDWN woman in nad  vss afeb   heent ncat neck symm supple  cor reg  chest clear   abd soft ndnt no cvat  gu no mass no cvat  neuro non focal  psych a and o x 3    LABS:   @ 04:37    WBC 6.59  / Hct 36.1  / SCr 0.88      @ 22:50    WBC --    / Hct --    / SCr 0.77         140  |  103  |  19  ----------------------------<  121<H>  5.0   |  30  |  0.88    Ca    9.4      2021 04:37    TPro  7.2  /  Alb  3.3  /  TBili  0.3  /  DBili  x   /  AST  25  /  ALT  11  /  AlkPhos  81        Urinalysis Basic - ( 2021 21:41 )    Color: BROWN / Appearance: Turbid / S.018 / pH: x  Gluc: x / Ketone: Negative  / Bili: Negative / Urobili: Negative   Blood: x / Protein: 100 mg/dL / Nitrite: Positive   Leuk Esterase: Large / RBC: >50 /hpf / WBC >50 /HPF   Sq Epi: x / Non Sq Epi: 1 / Bacteria: Many    Urine Cx:  pending    RADIOLOGY:  Moderate left hydroureteronephrosis secondary to a 0.7 cm calculus in the mid pelvic left ureteral segment. Additional renal calculi are identified on the left measuring up to 1.6 cm. Multiple right renal calculi identified measuring up to 9 mm. There is no evidence for right-sided hydronephrosis.

## 2021-07-31 NOTE — ED ADULT NURSE REASSESSMENT NOTE - COMFORT CARE
darkened lights/plan of care explained/side rails up/treatment delay explained/wait time explained/warm blanket provided
ambulated to bathroom/darkened lights/plan of care explained/po fluids offered/repositioned/warm blanket provided

## 2021-07-31 NOTE — H&P ADULT - NSHPLABSRESULTS_GEN_ALL_CORE
11.3   6.59  )-----------( 272      ( 2021 04:37 )             36.1           140  |  103  |  19  ----------------------------<  121<H>  5.0   |  30  |  0.88    Ca    9.4      2021 04:37    TPro  7.2  /  Alb  3.3  /  TBili  0.3  /  DBili  x   /  AST  25  /  ALT  11  /  AlkPhos  81  07              Urinalysis Basic - ( 2021 21:41 )    Color: BROWN / Appearance: Turbid / S.018 / pH: x  Gluc: x / Ketone: Negative  / Bili: Negative / Urobili: Negative   Blood: x / Protein: 100 mg/dL / Nitrite: Positive   Leuk Esterase: Large / RBC: >50 /hpf / WBC >50 /HPF   Sq Epi: x / Non Sq Epi: 1 / Bacteria: Many      < from: Xray Chest 1 View- PORTABLE-Urgent (Xray Chest 1 View- PORTABLE-Urgent .) (21 @ 01:08) >      IMPRESSION:  Clear lungs.    < end of copied text >    < from: CT Abdomen and Pelvis No Cont (21 @ 17:47) >    IMPRESSION:  Moderate left hydroureteronephrosis secondary to a 0.7 cm calculus in the mid pelvic left ureteral segment. Additional renal calculi are identified on the left measuring up to 1.6 cm. Multiple right renal calculi identified measuring up to 9 mm. There is no evidence for right-sided hydronephrosis.    < end of copied text >

## 2021-07-31 NOTE — H&P ADULT - NSHPSOCIALHISTORY_GEN_ALL_CORE
Social History:    Marital Status:  (   )    (   ) Single    (   )    ( x )   Occupation:   Lives with: (  ) alone  (  ) children   (  ) spouse   (  ) parents  ( x ) other    Substance Use (street drugs): (x  ) never used  (  ) other:  Tobacco Usage:  (  ) never smoked   ( x  ) former smoker   (   ) current smoker  (     ) pack years  (        ) last cigarette date  Alcohol Usage:  denies    (     ) Advanced Directives: (     ) None    (      ) DNR    (     ) DNI    (     ) Health Care Proxy:

## 2021-07-31 NOTE — H&P ADULT - ASSESSMENT
76 f with    Ureteral stone/ Hydronephrosis  - pain control  - Urology evaluation    UTI  - antibiotic  - ID evaluation    CAD (coronary artery disease)   - continue Rx     Chronic bronchitis   - stable    HTN (hypertension)  - control    Morbid obesity with BMI of 40.0-44.9, adult  - Nutrition education     DVT prophylaxis    Further action as per clinical course    Amaury Dockery MD pager [phone]

## 2021-07-31 NOTE — ED CDU PROVIDER INITIAL DAY NOTE - PMH
CAD (coronary artery disease)    Cataract    Chronic bronchitis    Emphysema lung  mild, on CT scan 2017  HTN (hypertension)  diagnosed 10/2017  Lumbar herniated disc    Malignant neoplasm of cervix, unspecified site  s/p ROBERTO 1978  Morbid obesity with BMI of 40.0-44.9, adult    TYLOR (obstructive sleep apnea)  as per [facility] criteria  Proteinuria    Renal calculus, bilateral    Smoker  1/2 pack X 55 years  Spinal stenosis of lumbar region

## 2021-07-31 NOTE — ED CDU PROVIDER INITIAL DAY NOTE - WR ORDER STATUS 1
Do not drive or operate machinery/Do not make important decisions/No heavy lifting/straining/Stairs allowed/Walking - Indoors allowed/Showering allowed Resulted

## 2021-07-31 NOTE — ED CDU PROVIDER SUBSEQUENT DAY NOTE - PMH
CAD (coronary artery disease)    Cataract    Chronic bronchitis    Emphysema lung  mild, on CT scan 2017  HTN (hypertension)  diagnosed 10/2017  Lumbar herniated disc    Malignant neoplasm of cervix, unspecified site  s/p ROBERTO 1978  Morbid obesity with BMI of 40.0-44.9, adult    TYLOR (obstructive sleep apnea)  as per [facility] criteria  Proteinuria    Renal calculus, bilateral    Smoker  1/2 pack X 55 years  Spinal stenosis of lumbar region

## 2021-07-31 NOTE — ED CDU PROVIDER INITIAL DAY NOTE - PHYSICAL EXAMINATION
GENERAL: well appearing in no acute distress, non-toxic appearing  	HEAD: normocephalic, atraumatic  	HEENT: normal conjunctiva, oral mucosa moist  	CARDIAC: regular rate and rhythm, normal S1S2  	PULM: normal breath sounds, clear to ascultation bilaterally  	GI: abdomen nondistended, soft  	: + Left CVA tenderness, no suprapubic tenderness  	NEURO:  AAOx3, follows commands, FROMx4  	SKIN: well-perfused, extremities warm, no visible rashes  PSYCH: appropriate mood and affect

## 2021-07-31 NOTE — H&P ADULT - NSHPPHYSICALEXAM_GEN_ALL_CORE
PHYSICAL EXAMINATION:  Vital Signs Last 24 Hrs  T(C): 37.3 (31 Jul 2021 12:14), Max: 37.9 (31 Jul 2021 05:00)  T(F): 99.2 (31 Jul 2021 12:14), Max: 100.2 (31 Jul 2021 05:00)  HR: 87 (31 Jul 2021 12:14) (67 - 88)  BP: 146/70 (31 Jul 2021 12:14) (98/63 - 161/76)  BP(mean): --  RR: 18 (31 Jul 2021 05:00) (17 - 18)  SpO2: 96% (31 Jul 2021 12:14) (94% - 99%)  CAPILLARY BLOOD GLUCOSE          GENERAL: NAD, well-groomed, well-developed  HEAD:  atraumatic, normocephalic  EYES: sclera anicteric  ENMT: mucous membranes moist  NECK: supple, No JVD  CHEST/LUNG: clear to auscultation bilaterally; no rales, rhonchi, or wheezing b/l  HEART: normal S1, S2  ABDOMEN: BS+, soft, ND, NT  L flank CVT  EXTREMITIES:  pulses palpable; no clubbing, cyanosis, or edema b/l LEs  NEURO: awake, alert, interactive; moves all extremities  SKIN: no rashes or lesions

## 2021-07-31 NOTE — ED CDU PROVIDER INITIAL DAY NOTE - DETAILS
Renal Colic  -Pain control  -gentle IVF, cardiac history   -Urology following  -Flomax  -Vitals every 4 hours, frequent reevaluations, ZANA Olmos

## 2021-07-31 NOTE — H&P ADULT - HISTORY OF PRESENT ILLNESS
76F with h/o Nephrolithiasis s/p lithotripsy x3 s/p nephrostomy, CAD s/p CABGx4, HTN, Chronic bronchitis, emphysema, TYLOR, spinal stenosis, Cervical Ca s/p ROBERTO, s/p Appendectomy, s/p cholecystectomy, s/p left lumpectomy, p/w 4 hours of left flank pain. Pain began suddenly in left flank, feels achey and comes in waves, 10/10 in severity, does not radiate, is associated with nausea but no vomiting, and feels similar to previous kidney stones.  Denies dysuria, hematuria, fever/chills, trauma, CP, SOB, Dizziness.

## 2021-07-31 NOTE — ED CDU PROVIDER INITIAL DAY NOTE - PROGRESS NOTE DETAILS
Patient states that she is being worked up for CHF, on Lasix. DW Dr. Olmos, will obtain CXR. If clear, OK to place patient on standing IVF. Will I+O and monitor closely. - Jena Vásquez PA-C

## 2021-07-31 NOTE — ED CDU PROVIDER INITIAL DAY NOTE - OBJECTIVE STATEMENT
76F with h/o Nephrolithiasis s/p lithotripsy x3 s/p nephrostomy, CAD with stents s/p CABGx4 (on prasugrel), HTN, Chronic bronchitis, emphysema, TYLOR, spinal stenosis, Cervical Ca s/p ROBERTO, s/p Appendectomy, s/p cholecystectomy, s/p left lumpectomy, p/w left flank pain. Pain began suddenly in left flank, feels achey and comes in waves, 10/10 in severity, does not radiate, is associated with nausea but no vomiting, and feels similar to previous kidney stones. Currently asymptomatic in ED. Denies dysuria, hematuria, fever/chills, trauma, CP, SOB, Dizziness.  ED course: Cr 0.81, UA +Leuks/Nitrite, CT abdomen showed "Moderate left hydroureteronephrosis secondary to a 0.7 cm calculus in the mid pelvic left ureteral segment. Additional renal calculi are identified on the left measuring up to 1.6 cm. Multiple right renal calculi identified measuring up to 9 mm. There is no evidence for right-sided hydronephrosis. " Urology consulted. Started on IV Ceftriaxone. Plan for IVF, pain control, repeat labs, and reevaluation by urology in CDU.

## 2021-07-31 NOTE — ED ADULT NURSE REASSESSMENT NOTE - NS ED NURSE REASSESS COMMENT FT1
pt received at change of shift, pt denies any pain, pt updated on plan of care, safety and comfort measures maintained, will continue to monitor.
Received pt from CHARLINE Menard , received pt alert and responsive, oriented x4, denies any respiratory distress, SOB, or difficulty breathing. Pt transferred to CDU for renal stones. Pt is pain free at this time with no complaints. IV in place, patent and free of signs of infiltration, V/S stable, pt afebrile, pt denies pain at this time. Pt educated on unit and unit rules, instructed patient to notify RN of any needed assistance, Pt verbalizes understanding, Call bell placed within reach. Safety maintained. Will continue to monitor. Pending chest xray, pt aware.

## 2021-07-31 NOTE — H&P ADULT - NSHPREVIEWOFSYSTEMS_GEN_ALL_CORE
REVIEW OF SYSTEMS:    CONSTITUTIONAL: No weakness,+ fevers + chills  EYES/ENT: No visual changes;  No vertigo or throat pain   NECK: No pain or stiffness  RESPIRATORY: No cough, wheezing, hemoptysis; No shortness of breath  CARDIOVASCULAR: No chest pain or palpitations  GASTROINTESTINAL: No abdominal or epigastric pain. No nausea, vomiting, or hematemesis; No diarrhea or constipation. No melena or hematochezia.  GENITOURINARY: No dysuria, frequency or hematuria L flank pain  NEUROLOGICAL: No numbness or weakness  SKIN: No itching, burning, rashes, or lesions   All other review of systems is negative unless indicated above.

## 2021-08-01 DIAGNOSIS — N39.0 URINARY TRACT INFECTION, SITE NOT SPECIFIED: ICD-10-CM

## 2021-08-01 LAB
ANION GAP SERPL CALC-SCNC: 10 MMOL/L — SIGNIFICANT CHANGE UP (ref 5–17)
BUN SERPL-MCNC: 13 MG/DL — SIGNIFICANT CHANGE UP (ref 7–23)
CALCIUM SERPL-MCNC: 9.4 MG/DL — SIGNIFICANT CHANGE UP (ref 8.4–10.5)
CHLORIDE SERPL-SCNC: 100 MMOL/L — SIGNIFICANT CHANGE UP (ref 96–108)
CO2 SERPL-SCNC: 27 MMOL/L — SIGNIFICANT CHANGE UP (ref 22–31)
COVID-19 SPIKE DOMAIN AB INTERP: NEGATIVE — SIGNIFICANT CHANGE UP
COVID-19 SPIKE DOMAIN ANTIBODY RESULT: 0.4 U/ML — SIGNIFICANT CHANGE UP
CREAT SERPL-MCNC: 0.66 MG/DL — SIGNIFICANT CHANGE UP (ref 0.5–1.3)
GLUCOSE SERPL-MCNC: 98 MG/DL — SIGNIFICANT CHANGE UP (ref 70–99)
HCT VFR BLD CALC: 36.9 % — SIGNIFICANT CHANGE UP (ref 34.5–45)
HGB BLD-MCNC: 11.7 G/DL — SIGNIFICANT CHANGE UP (ref 11.5–15.5)
MCHC RBC-ENTMCNC: 30.7 PG — SIGNIFICANT CHANGE UP (ref 27–34)
MCHC RBC-ENTMCNC: 31.7 GM/DL — LOW (ref 32–36)
MCV RBC AUTO: 96.9 FL — SIGNIFICANT CHANGE UP (ref 80–100)
NRBC # BLD: 0 /100 WBCS — SIGNIFICANT CHANGE UP (ref 0–0)
PLATELET # BLD AUTO: 269 K/UL — SIGNIFICANT CHANGE UP (ref 150–400)
POTASSIUM SERPL-MCNC: 3.8 MMOL/L — SIGNIFICANT CHANGE UP (ref 3.5–5.3)
POTASSIUM SERPL-SCNC: 3.8 MMOL/L — SIGNIFICANT CHANGE UP (ref 3.5–5.3)
RBC # BLD: 3.81 M/UL — SIGNIFICANT CHANGE UP (ref 3.8–5.2)
RBC # FLD: 14.1 % — SIGNIFICANT CHANGE UP (ref 10.3–14.5)
SARS-COV-2 IGG+IGM SERPL QL IA: 0.4 U/ML — SIGNIFICANT CHANGE UP
SARS-COV-2 IGG+IGM SERPL QL IA: NEGATIVE — SIGNIFICANT CHANGE UP
SODIUM SERPL-SCNC: 137 MMOL/L — SIGNIFICANT CHANGE UP (ref 135–145)
WBC # BLD: 8.36 K/UL — SIGNIFICANT CHANGE UP (ref 3.8–10.5)
WBC # FLD AUTO: 8.36 K/UL — SIGNIFICANT CHANGE UP (ref 3.8–10.5)

## 2021-08-01 PROCEDURE — 99232 SBSQ HOSP IP/OBS MODERATE 35: CPT

## 2021-08-01 RX ORDER — POLYETHYLENE GLYCOL 3350 17 G/17G
17 POWDER, FOR SOLUTION ORAL ONCE
Refills: 0 | Status: COMPLETED | OUTPATIENT
Start: 2021-08-01 | End: 2021-08-01

## 2021-08-01 RX ADMIN — RANOLAZINE 500 MILLIGRAM(S): 500 TABLET, FILM COATED, EXTENDED RELEASE ORAL at 17:50

## 2021-08-01 RX ADMIN — HEPARIN SODIUM 5000 UNIT(S): 5000 INJECTION INTRAVENOUS; SUBCUTANEOUS at 06:04

## 2021-08-01 RX ADMIN — FAMOTIDINE 40 MILLIGRAM(S): 10 INJECTION INTRAVENOUS at 06:04

## 2021-08-01 RX ADMIN — HEPARIN SODIUM 5000 UNIT(S): 5000 INJECTION INTRAVENOUS; SUBCUTANEOUS at 17:50

## 2021-08-01 RX ADMIN — POLYETHYLENE GLYCOL 3350 17 GRAM(S): 17 POWDER, FOR SOLUTION ORAL at 06:05

## 2021-08-01 RX ADMIN — CEFTRIAXONE 100 MILLIGRAM(S): 500 INJECTION, POWDER, FOR SOLUTION INTRAMUSCULAR; INTRAVENOUS at 21:28

## 2021-08-01 RX ADMIN — ATORVASTATIN CALCIUM 80 MILLIGRAM(S): 80 TABLET, FILM COATED ORAL at 21:28

## 2021-08-01 RX ADMIN — RANOLAZINE 500 MILLIGRAM(S): 500 TABLET, FILM COATED, EXTENDED RELEASE ORAL at 06:05

## 2021-08-01 RX ADMIN — TAMSULOSIN HYDROCHLORIDE 0.4 MILLIGRAM(S): 0.4 CAPSULE ORAL at 21:28

## 2021-08-01 RX ADMIN — FAMOTIDINE 40 MILLIGRAM(S): 10 INJECTION INTRAVENOUS at 17:49

## 2021-08-01 RX ADMIN — CEFTRIAXONE 100 MILLIGRAM(S): 500 INJECTION, POWDER, FOR SOLUTION INTRAMUSCULAR; INTRAVENOUS at 10:10

## 2021-08-01 RX ADMIN — Medication 20 MILLIGRAM(S): at 06:04

## 2021-08-01 RX ADMIN — Medication 100 MILLIGRAM(S): at 06:04

## 2021-08-01 NOTE — CONSULT NOTE ADULT - SUBJECTIVE AND OBJECTIVE BOX
CARDIOLOGY CONSULT NOTE - DR. BELLAMY    HPI:  76F with h/o Nephrolithiasis s/p lithotripsy x3 s/p nephrostomy, CAD s/p CABGx4, HTN, Chronic bronchitis, emphysema, TYLOR, spinal stenosis, Cervical Ca s/p ROBERTO, s/p Appendectomy, s/p cholecystectomy, s/p left lumpectomy, p/w 4 hours of left flank pain. Pain began suddenly in left flank, feels achey and comes in waves, 10/10 in severity, does not radiate, is associated with nausea but no vomiting, and feels similar to previous kidney stones.  Denies dysuria, hematuria, fever/chills, trauma, CP, SOB, Dizziness. (31 Jul 2021 12:21)      PAST MEDICAL & SURGICAL HISTORY:  Malignant neoplasm of cervix, unspecified site  s/p ROBERTO 1978    Smoker  1/2 pack X 55 years    HTN (hypertension)  diagnosed 10/2017    Emphysema lung  mild, on CT scan 2017    Proteinuria    Renal calculus, bilateral    Morbid obesity with BMI of 40.0-44.9, adult    TYLOR (obstructive sleep apnea)  as per [facility] criteria    Lumbar herniated disc    Spinal stenosis of lumbar region    CAD (coronary artery disease)    Chronic bronchitis    Cataract    History of appendectomy  1954    H/O abdominal hysterectomy  ROBERTO 1978    H/O lithotripsy  ESWL 1986 left, 2004 right, 2007 left    S/P breast lumpectomy  left, benign    Prolapse, uterovaginal  s/p TVT Sling 2000    S/P cholecystectomy  2005    S/P ERCP  2006    S/P cataract surgery  bilateral, 2015    S/P CABG x 4          PREVIOUS DIAGNOSTIC TESTING:    [ ] Echocardiogram:  [ ]  Catheterization:  [ ] Stress Test:  	    MEDICATIONS:    Home Medications:  aspirin 81 mg oral tablet: orally once a day (31 Jul 2021 11:32)  famotidine 40 mg oral tablet: 1 tab(s) orally 2 times a day (31 Jul 2021 11:32)  furosemide 20 mg oral tablet: 1 tab(s) orally once a day (31 Jul 2021 11:32)  Metoprolol Succinate  mg oral tablet, extended release: 1 tab(s) orally once a day (at bedtime) (31 Jul 2021 11:32)  prasugrel 5 mg oral tablet: 1 tab(s) orally once a day (31 Jul 2021 11:32)  ranolazine 500 mg oral tablet, extended release: 1 tab(s) orally 2 times a day (31 Jul 2021 11:32)  rosuvastatin 20 mg oral tablet: 1 tab(s) orally once a day (at bedtime) (31 Jul 2021 11:32)      MEDICATIONS  (STANDING):  atorvastatin 80 milliGRAM(s) Oral at bedtime  cefTRIAXone   IVPB 1000 milliGRAM(s) IV Intermittent every 12 hours  famotidine    Tablet 40 milliGRAM(s) Oral two times a day  furosemide    Tablet 20 milliGRAM(s) Oral daily  heparin   Injectable 5000 Unit(s) SubCutaneous every 12 hours  metoprolol succinate  milliGRAM(s) Oral daily  ranolazine 500 milliGRAM(s) Oral two times a day  sodium chloride 0.9%. 1000 milliLiter(s) (75 mL/Hr) IV Continuous <Continuous>  tamsulosin 0.4 milliGRAM(s) Oral at bedtime      FAMILY HISTORY:  No pertinent family history in first degree relatives        SOCIAL HISTORY:    [ ] Non-smoker  [ ] Smoker  [ ] Alcohol    Allergies    No Known Allergies    Intolerances    	    REVIEW OF SYSTEMS:  CONSTITUTIONAL: No fever, weight loss, or fatigue  EYES: No eye pain, visual disturbances, or discharge  ENMT:  No difficulty hearing, tinnitus, vertigo; No sinus or throat pain  NECK: No pain or stiffness  RESPIRATORY: No cough, wheezing, chills or hemoptysis; No Shortness of Breath  CARDIOVASCULAR: as HPI  GASTROINTESTINAL: No abdominal or epigastric pain. No nausea, vomiting, or hematemesis; No diarrhea or constipation. No melena or hematochezia.  GENITOURINARY: No dysuria, frequency, hematuria, or incontinence  NEUROLOGICAL: No headaches, memory loss, loss of strength, numbness, or tremors  SKIN: No itching, burning, rashes, or lesions   	  [ ] All others negative	  [ ] Unable to obtain    PHYSICAL EXAM:    T(C): 36.9 (08-01-21 @ 10:15), Max: 38.1 (07-31-21 @ 23:33)  HR: 78 (08-01-21 @ 10:15) (72 - 87)  BP: 100/58 (08-01-21 @ 10:15) (100/58 - 153/73)  RR: 18 (08-01-21 @ 10:15) (18 - 18)  SpO2: 94% (08-01-21 @ 10:15) (94% - 96%)  Wt(kg): --  I&O's Summary    31 Jul 2021 07:01  -  01 Aug 2021 07:00  --------------------------------------------------------  IN: 0 mL / OUT: 300 mL / NET: -300 mL    01 Aug 2021 07:01  -  01 Aug 2021 11:18  --------------------------------------------------------  IN: 170 mL / OUT: 0 mL / NET: 170 mL      Daily Height in cm: 160.02 (31 Jul 2021 18:25)    Daily     Appearance: Normal	  Psychiatry: A & O x 3, Mood & affect appropriate  HEENT:   Normal oral mucosa, PERRL, EOMI	  Lymphatic: No lymphadenopathy  Cardiovascular: Normal S1 S2,RRR, No JVD, No murmurs  Respiratory: Lungs clear to auscultation	  Gastrointestinal:  Soft, Non-tender, + BS	  Skin: No rashes, No ecchymoses, No cyanosis	  Neurologic: Non-focal  Extremities: Normal range of motion, No clubbing, cyanosis or edema  Vascular: Peripheral pulses palpable 2+ bilaterally    TELEMETRY: 	    ECG:  	  RADIOLOGY:  OTHER: 	  	  LABS:	 	    CARDIAC MARKERS:        proBNP:     Lipid Profile:   HgA1c:   TSH:                           11.7   8.36  )-----------( 269      ( 01 Aug 2021 07:21 )             36.9     08-01    137  |  100  |  13  ----------------------------<  98  3.8   |  27  |  0.66    Ca    9.4      01 Aug 2021 07:21    TPro  7.2  /  Alb  3.3  /  TBili  0.3  /  DBili  x   /  AST  25  /  ALT  11  /  AlkPhos  81  07-30        Creatinine, Serum: 0.66 mg/dL (08-01-21 @ 07:21)  Creatinine, Serum: 0.88 mg/dL (07-31-21 @ 04:37)  Creatinine, Serum: 0.77 mg/dL (07-30-21 @ 22:50)  Creatinine, Serum: 0.81 mg/dL (07-30-21 @ 17:02)        ASSESSMENT/PLAN: 	               CARDIOLOGY CONSULT NOTE - DR. BELLAMY    HPI:  76F with h/o Nephrolithiasis s/p lithotripsy x3 s/p nephrostomy, CAD s/p CABGx4, HTN, Chronic bronchitis, emphysema, TYLOR, spinal stenosis, Cervical Ca s/p ROBERTO, s/p Appendectomy, s/p cholecystectomy, s/p left lumpectomy, p/w 4 hours of left flank pain. Pain began suddenly in left flank, feels achey and comes in waves, 10/10 in severity, does not radiate, is associated with nausea but no vomiting, and feels similar to previous kidney stones.  Denies dysuria, hematuria, fever/chills, trauma, CP, SOB, Dizziness. (31 Jul 2021 12:21)    no chest pain, dyspnea      PAST MEDICAL & SURGICAL HISTORY:  Malignant neoplasm of cervix, unspecified site  s/p ROBERTO 1978    Smoker  1/2 pack X 55 years    HTN (hypertension)  diagnosed 10/2017    Emphysema lung  mild, on CT scan 2017    Proteinuria    Renal calculus, bilateral    Morbid obesity with BMI of 40.0-44.9, adult    TYLOR (obstructive sleep apnea)  as per [facility] criteria    Lumbar herniated disc    Spinal stenosis of lumbar region    CAD (coronary artery disease)    Chronic bronchitis    Cataract    History of appendectomy  1954    H/O abdominal hysterectomy  ROBERTO 1978    H/O lithotripsy  [facility] 1986 left, 2004 right, 2007 left    S/P breast lumpectomy  left, benign    Prolapse, uterovaginal  s/p TVT Sling 2000    S/P cholecystectomy  2005    S/P ERCP  2006    S/P cataract surgery  bilateral, 2015    S/P CABG x 4          PREVIOUS DIAGNOSTIC TESTING:    [ ] Echocardiogram:  [ ]  Catheterization:  [ ] Stress Test:  	    MEDICATIONS:    Home Medications:  aspirin 81 mg oral tablet: orally once a day (31 Jul 2021 11:32)  famotidine 40 mg oral tablet: 1 tab(s) orally 2 times a day (31 Jul 2021 11:32)  furosemide 20 mg oral tablet: 1 tab(s) orally once a day (31 Jul 2021 11:32)  Metoprolol Succinate  mg oral tablet, extended release: 1 tab(s) orally once a day (at bedtime) (31 Jul 2021 11:32)  prasugrel 5 mg oral tablet: 1 tab(s) orally once a day (31 Jul 2021 11:32)  ranolazine 500 mg oral tablet, extended release: 1 tab(s) orally 2 times a day (31 Jul 2021 11:32)  rosuvastatin 20 mg oral tablet: 1 tab(s) orally once a day (at bedtime) (31 Jul 2021 11:32)      MEDICATIONS  (STANDING):  atorvastatin 80 milliGRAM(s) Oral at bedtime  cefTRIAXone   IVPB 1000 milliGRAM(s) IV Intermittent every 12 hours  famotidine    Tablet 40 milliGRAM(s) Oral two times a day  furosemide    Tablet 20 milliGRAM(s) Oral daily  heparin   Injectable 5000 Unit(s) SubCutaneous every 12 hours  metoprolol succinate  milliGRAM(s) Oral daily  ranolazine 500 milliGRAM(s) Oral two times a day  sodium chloride 0.9%. 1000 milliLiter(s) (75 mL/Hr) IV Continuous <Continuous>  tamsulosin 0.4 milliGRAM(s) Oral at bedtime      FAMILY HISTORY:  No pertinent family history in first degree relatives        SOCIAL HISTORY:    [ x] Non-smoker  [ ] Smoker  [ ] Alcohol    Allergies    No Known Allergies    Intolerances    	    REVIEW OF SYSTEMS:  CONSTITUTIONAL: No fever, weight loss, or fatigue  EYES: No eye pain, visual disturbances, or discharge  ENMT:  No difficulty hearing, tinnitus, vertigo; No sinus or throat pain  NECK: No pain or stiffness  RESPIRATORY: No cough, wheezing, chills or hemoptysis; No Shortness of Breath  CARDIOVASCULAR: as HPI  GASTROINTESTINAL: No abdominal or epigastric pain. No nausea, vomiting, or hematemesis; No diarrhea or constipation. No melena or hematochezia.  GENITOURINARY: No dysuria, frequency, hematuria, or incontinence  NEUROLOGICAL: No headaches, memory loss, loss of strength, numbness, or tremors  SKIN: No itching, burning, rashes, or lesions   	  [ ] All others negative	  [ ] Unable to obtain    PHYSICAL EXAM:    T(C): 36.9 (08-01-21 @ 10:15), Max: 38.1 (07-31-21 @ 23:33)  HR: 78 (08-01-21 @ 10:15) (72 - 87)  BP: 100/58 (08-01-21 @ 10:15) (100/58 - 153/73)  RR: 18 (08-01-21 @ 10:15) (18 - 18)  SpO2: 94% (08-01-21 @ 10:15) (94% - 96%)  Wt(kg): --  I&O's Summary    31 Jul 2021 07:01  -  01 Aug 2021 07:00  --------------------------------------------------------  IN: 0 mL / OUT: 300 mL / NET: -300 mL    01 Aug 2021 07:01  -  01 Aug 2021 11:18  --------------------------------------------------------  IN: 170 mL / OUT: 0 mL / NET: 170 mL      Daily Height in cm: 160.02 (31 Jul 2021 18:25)    Daily     Appearance: Normal	  Psychiatry: A & O x 3, Mood & affect appropriate  HEENT:   Normal oral mucosa, PERRL, EOMI	  Lymphatic: No lymphadenopathy  Cardiovascular: Normal S1 S2,RRR, No JVD, No murmurs  Respiratory: Lungs clear to auscultation	  Gastrointestinal:  Soft, Non-tender, + BS	  Skin: No rashes, No ecchymoses, No cyanosis	  Neurologic: Non-focal  Extremities: Normal range of motion,min edema   Vascular: Peripheral pulses palpable 2+ bilaterally    TELEMETRY: 	    ECG:  	cant find   RADIOLOGY:  OTHER: 	  	  LABS:	 	    CARDIAC MARKERS:        proBNP:     Lipid Profile:   HgA1c:   TSH:                           11.7   8.36  )-----------( 269      ( 01 Aug 2021 07:21 )             36.9     08-01    137  |  100  |  13  ----------------------------<  98  3.8   |  27  |  0.66    Ca    9.4      01 Aug 2021 07:21    TPro  7.2  /  Alb  3.3  /  TBili  0.3  /  DBili  x   /  AST  25  /  ALT  11  /  AlkPhos  81  07-30        Creatinine, Serum: 0.66 mg/dL (08-01-21 @ 07:21)  Creatinine, Serum: 0.88 mg/dL (07-31-21 @ 04:37)  Creatinine, Serum: 0.77 mg/dL (07-30-21 @ 22:50)  Creatinine, Serum: 0.81 mg/dL (07-30-21 @ 17:02)    ACP- Advanced Care Planning  -Advanced care planning discussed with patient. Advanced care planning forms discussed with patient and/or family.  Risks, benefits, and alternatives of medical/cardiac procedures were discussed in detail with all questions answered.  30 minutes were spent addressing advance care planning.      ASSESSMENT/PLAN:

## 2021-08-01 NOTE — CONSULT NOTE ADULT - ASSESSMENT
76F with h/o Nephrolithiasis s/p lithotripsy x3 s/p nephrostomy, CAD s/p CABGx4, HTN, Chronic bronchitis, emphysema, TYLOR, spinal stenosis, Cervical Ca s/p ROBERTO, s/p Appendectomy, s/p cholecystectomy, s/p left lumpectomy, p/w 4 hours of left flank pain. Pain began suddenly in left flank, feels achey and comes in waves, 10/10 in severity, does not radiate, is associated with nausea but no vomiting, and feels similar to previous kidney stones.  Denies dysuria, hematuria, fever/chills, trauma, CP, SOB, Dizziness. (31 Jul 2021 12:21) 76F with h/o Nephrolithiasis s/p lithotripsy x3 s/p nephrostomy, CAD s/p CABGx4, HTN, Chronic bronchitis, emphysema, TYLOR, spinal stenosis, Cervical Ca s/p ROBERTO, s/p Appendectomy, s/p cholecystectomy, s/p left lumpectomy, p/w 4 hours of left flank pain.     #UTI, renal stones, await NT  -abx per med/urology  -asa and effient on hold   -resume at least ASA as soon as feasible for hx of CABG, recent PCI  -check EKG  -assuming unremarkable ECG, remains cardiac optimized to proceed with acceptable cardiac risk     #CAD, CABG, s/p PCI to diag 3/2021  -cv stable, no chest pain  -asa/effient on hold  -resume ASA as soon as feasible       dvt ppx         70 minutes spent on total encounter; more than 50% of the visit was spent counseling and/or coordinating care by the attending physician.

## 2021-08-01 NOTE — CHART NOTE - NSCHARTNOTEFT_GEN_A_CORE
Dr Goldberg requesting to stop ASA and Effient for IR intervention of stent and nephrostomy tube.   Will informed PCP

## 2021-08-01 NOTE — CONSULT NOTE ADULT - SUBJECTIVE AND OBJECTIVE BOX
Interventional Radiology    Evaluate for Procedure:     HPI: 76y Female with CAD on ASA/Effient presents with left flank pain, found to have large left renal and small ureteral calculi, low grade fever on ceftriaxone, IR consulted for placement of nephrostomy tube for hydronephrosis with sepsis, possible PCNL access.    Allergies:   Medications (Abx/Cardiac/Anticoagulation/Blood Products)    cefTRIAXone   IVPB: 100 mL/Hr IV Intermittent (07-30 @ 22:31)  cefTRIAXone   IVPB: 100 mL/Hr IV Intermittent (08-01 @ 10:10)  furosemide    Tablet: 20 milliGRAM(s) Oral (08-01 @ 06:04)  heparin   Injectable: 5000 Unit(s) SubCutaneous (08-01 @ 06:04)  metoprolol succinate ER: 100 milliGRAM(s) Oral (08-01 @ 06:04)  ranolazine: 500 milliGRAM(s) Oral (08-01 @ 06:05)  tamsulosin: 0.4 milliGRAM(s) Oral (07-31 @ 22:05)    Data:  160  118.8  T(C): 36.9  HR: 78  BP: 100/58  RR: 18  SpO2: 94%    -WBC 8.36 / HgB 11.7 / Hct 36.9 / Plt 269  -Na 137 / Cl 100 / BUN 13 / Glucose 98  -K 3.8 / CO2 27 / Cr 0.66  -ALT -- / Alk Phos -- / T.Bili --  -INR 1.01 / PTT 31.6    Radiology:     Assessment/Plan:   76y Female with CAD on ASA/Effient presents with left flank pain, found to have large left renal and small ureteral calculi, low grade fever on ceftriaxone, IR consulted for placement of nephrostomy tube for hydronephrosis with sepsis, possible PCNL access.    Case discussed with Dr. Goldberg by Dr. Kirkland  Given patient on ASA/Effient and hemodynamically stable on antibiotics, would recommend holding ASA/Effient for total 5 days. Per task viewer, patient last took on 7/31.     plan for  above procedure Thursday. can put order for IR procedure under Dr. Kirkland  NPO AMN for sedation wed  please recheck CBC BMP Coags in AM Thursday    if patient demonstrates acute decompensation/hemodynamic instability, please page IR at [phone] for re-evaluation for emergent intervention despite antiplatelet agents Interventional Radiology    Evaluate for Procedure:     HPI: 76y Female with CAD on ASA/Effient presents with left flank pain, found to have large left renal and small ureteral calculi, low grade fever on ceftriaxone, IR consulted for placement of nephrostomy tube for hydronephrosis with sepsis, possible PCNL access.    Allergies:   Medications (Abx/Cardiac/Anticoagulation/Blood Products)    cefTRIAXone   IVPB: 100 mL/Hr IV Intermittent (07-30 @ 22:31)  cefTRIAXone   IVPB: 100 mL/Hr IV Intermittent (08-01 @ 10:10)  furosemide    Tablet: 20 milliGRAM(s) Oral (08-01 @ 06:04)  heparin   Injectable: 5000 Unit(s) SubCutaneous (08-01 @ 06:04)  metoprolol succinate ER: 100 milliGRAM(s) Oral (08-01 @ 06:04)  ranolazine: 500 milliGRAM(s) Oral (08-01 @ 06:05)  tamsulosin: 0.4 milliGRAM(s) Oral (07-31 @ 22:05)    Data:  160  118.8  T(C): 36.9  HR: 78  BP: 100/58  RR: 18  SpO2: 94%    -WBC 8.36 / HgB 11.7 / Hct 36.9 / Plt 269  -Na 137 / Cl 100 / BUN 13 / Glucose 98  -K 3.8 / CO2 27 / Cr 0.66  -ALT -- / Alk Phos -- / T.Bili --  -INR 1.01 / PTT 31.6    Radiology:     Assessment/Plan:   76y Female with CAD on ASA/Effient presents with left flank pain, found to have large left renal and small ureteral calculi, low grade fever on ceftriaxone, IR consulted for placement of nephrostomy tube for hydronephrosis with sepsis, possible PCNL access.    Case discussed with Dr. Goldberg by Dr. Kirkland  Given patient on ASA/Effient and hemodynamically stable on antibiotics, would recommend holding ASA/Effient for total 5 days. Per task viewer, patient last took on 7/31.     plan for  above procedure Thursday. can put order for IR procedure under Dr. Kirkland  NPO AMN for sedation Wed  please recheck CBC BMP Coags in AM Thursday    if patient demonstrates acute decompensation/hemodynamic instability, please page IR at [phone] for re-evaluation for emergent intervention despite antiplatelet agents

## 2021-08-02 LAB
-  AMIKACIN: SIGNIFICANT CHANGE UP
-  AMOXICILLIN/CLAVULANIC ACID: SIGNIFICANT CHANGE UP
-  AMPICILLIN/SULBACTAM: SIGNIFICANT CHANGE UP
-  AMPICILLIN: SIGNIFICANT CHANGE UP
-  AZTREONAM: SIGNIFICANT CHANGE UP
-  CEFAZOLIN: SIGNIFICANT CHANGE UP
-  CEFEPIME: SIGNIFICANT CHANGE UP
-  CEFOXITIN: SIGNIFICANT CHANGE UP
-  CEFTRIAXONE: SIGNIFICANT CHANGE UP
-  CIPROFLOXACIN: SIGNIFICANT CHANGE UP
-  ERTAPENEM: SIGNIFICANT CHANGE UP
-  GENTAMICIN: SIGNIFICANT CHANGE UP
-  IMIPENEM: SIGNIFICANT CHANGE UP
-  LEVOFLOXACIN: SIGNIFICANT CHANGE UP
-  MEROPENEM: SIGNIFICANT CHANGE UP
-  NITROFURANTOIN: SIGNIFICANT CHANGE UP
-  PIPERACILLIN/TAZOBACTAM: SIGNIFICANT CHANGE UP
-  TIGECYCLINE: SIGNIFICANT CHANGE UP
-  TOBRAMYCIN: SIGNIFICANT CHANGE UP
-  TRIMETHOPRIM/SULFAMETHOXAZOLE: SIGNIFICANT CHANGE UP
ANION GAP SERPL CALC-SCNC: 8 MMOL/L — SIGNIFICANT CHANGE UP (ref 5–17)
BUN SERPL-MCNC: 12 MG/DL — SIGNIFICANT CHANGE UP (ref 7–23)
CALCIUM SERPL-MCNC: 8.8 MG/DL — SIGNIFICANT CHANGE UP (ref 8.4–10.5)
CHLORIDE SERPL-SCNC: 101 MMOL/L — SIGNIFICANT CHANGE UP (ref 96–108)
CO2 SERPL-SCNC: 28 MMOL/L — SIGNIFICANT CHANGE UP (ref 22–31)
CREAT SERPL-MCNC: 0.69 MG/DL — SIGNIFICANT CHANGE UP (ref 0.5–1.3)
CULTURE RESULTS: SIGNIFICANT CHANGE UP
GLUCOSE SERPL-MCNC: 111 MG/DL — HIGH (ref 70–99)
HCT VFR BLD CALC: 32.9 % — LOW (ref 34.5–45)
HGB BLD-MCNC: 10.7 G/DL — LOW (ref 11.5–15.5)
MCHC RBC-ENTMCNC: 31.8 PG — SIGNIFICANT CHANGE UP (ref 27–34)
MCHC RBC-ENTMCNC: 32.5 GM/DL — SIGNIFICANT CHANGE UP (ref 32–36)
MCV RBC AUTO: 97.6 FL — SIGNIFICANT CHANGE UP (ref 80–100)
METHOD TYPE: SIGNIFICANT CHANGE UP
NRBC # BLD: 0 /100 WBCS — SIGNIFICANT CHANGE UP (ref 0–0)
ORGANISM # SPEC MICROSCOPIC CNT: SIGNIFICANT CHANGE UP
ORGANISM # SPEC MICROSCOPIC CNT: SIGNIFICANT CHANGE UP
PLATELET # BLD AUTO: 226 K/UL — SIGNIFICANT CHANGE UP (ref 150–400)
POTASSIUM SERPL-MCNC: 3.8 MMOL/L — SIGNIFICANT CHANGE UP (ref 3.5–5.3)
POTASSIUM SERPL-SCNC: 3.8 MMOL/L — SIGNIFICANT CHANGE UP (ref 3.5–5.3)
RBC # BLD: 3.37 M/UL — LOW (ref 3.8–5.2)
RBC # FLD: 14.1 % — SIGNIFICANT CHANGE UP (ref 10.3–14.5)
SODIUM SERPL-SCNC: 137 MMOL/L — SIGNIFICANT CHANGE UP (ref 135–145)
SPECIMEN SOURCE: SIGNIFICANT CHANGE UP
WBC # BLD: 6.29 K/UL — SIGNIFICANT CHANGE UP (ref 3.8–10.5)
WBC # FLD AUTO: 6.29 K/UL — SIGNIFICANT CHANGE UP (ref 3.8–10.5)

## 2021-08-02 PROCEDURE — 99232 SBSQ HOSP IP/OBS MODERATE 35: CPT

## 2021-08-02 PROCEDURE — 93010 ELECTROCARDIOGRAM REPORT: CPT

## 2021-08-02 RX ORDER — CHLORHEXIDINE GLUCONATE 213 G/1000ML
1 SOLUTION TOPICAL DAILY
Refills: 0 | Status: DISCONTINUED | OUTPATIENT
Start: 2021-08-02 | End: 2021-08-07

## 2021-08-02 RX ORDER — ACETAMINOPHEN 500 MG
1000 TABLET ORAL ONCE
Refills: 0 | Status: COMPLETED | OUTPATIENT
Start: 2021-08-02 | End: 2021-08-02

## 2021-08-02 RX ADMIN — CHLORHEXIDINE GLUCONATE 1 APPLICATION(S): 213 SOLUTION TOPICAL at 10:54

## 2021-08-02 RX ADMIN — Medication 400 MILLIGRAM(S): at 05:55

## 2021-08-02 RX ADMIN — FAMOTIDINE 40 MILLIGRAM(S): 10 INJECTION INTRAVENOUS at 05:28

## 2021-08-02 RX ADMIN — Medication 100 MILLIGRAM(S): at 05:27

## 2021-08-02 RX ADMIN — RANOLAZINE 500 MILLIGRAM(S): 500 TABLET, FILM COATED, EXTENDED RELEASE ORAL at 05:27

## 2021-08-02 RX ADMIN — CEFTRIAXONE 100 MILLIGRAM(S): 500 INJECTION, POWDER, FOR SOLUTION INTRAMUSCULAR; INTRAVENOUS at 09:37

## 2021-08-02 RX ADMIN — CEFTRIAXONE 100 MILLIGRAM(S): 500 INJECTION, POWDER, FOR SOLUTION INTRAMUSCULAR; INTRAVENOUS at 21:39

## 2021-08-02 RX ADMIN — Medication 650 MILLIGRAM(S): at 05:29

## 2021-08-02 RX ADMIN — TAMSULOSIN HYDROCHLORIDE 0.4 MILLIGRAM(S): 0.4 CAPSULE ORAL at 21:38

## 2021-08-02 RX ADMIN — FAMOTIDINE 40 MILLIGRAM(S): 10 INJECTION INTRAVENOUS at 17:15

## 2021-08-02 RX ADMIN — HEPARIN SODIUM 5000 UNIT(S): 5000 INJECTION INTRAVENOUS; SUBCUTANEOUS at 05:29

## 2021-08-02 RX ADMIN — RANOLAZINE 500 MILLIGRAM(S): 500 TABLET, FILM COATED, EXTENDED RELEASE ORAL at 17:14

## 2021-08-02 RX ADMIN — Medication 20 MILLIGRAM(S): at 05:28

## 2021-08-02 RX ADMIN — HEPARIN SODIUM 5000 UNIT(S): 5000 INJECTION INTRAVENOUS; SUBCUTANEOUS at 17:15

## 2021-08-02 RX ADMIN — Medication 1000 MILLIGRAM(S): at 06:15

## 2021-08-02 RX ADMIN — ATORVASTATIN CALCIUM 80 MILLIGRAM(S): 80 TABLET, FILM COATED ORAL at 21:38

## 2021-08-03 LAB
ALBUMIN SERPL ELPH-MCNC: 3.1 G/DL — LOW (ref 3.3–5)
ALP SERPL-CCNC: 71 U/L — SIGNIFICANT CHANGE UP (ref 40–120)
ALT FLD-CCNC: 7 U/L — LOW (ref 10–45)
ANION GAP SERPL CALC-SCNC: 10 MMOL/L — SIGNIFICANT CHANGE UP (ref 5–17)
AST SERPL-CCNC: 13 U/L — SIGNIFICANT CHANGE UP (ref 10–40)
BILIRUB SERPL-MCNC: 0.2 MG/DL — SIGNIFICANT CHANGE UP (ref 0.2–1.2)
BUN SERPL-MCNC: 14 MG/DL — SIGNIFICANT CHANGE UP (ref 7–23)
CALCIUM SERPL-MCNC: 9.5 MG/DL — SIGNIFICANT CHANGE UP (ref 8.4–10.5)
CHLORIDE SERPL-SCNC: 100 MMOL/L — SIGNIFICANT CHANGE UP (ref 96–108)
CO2 SERPL-SCNC: 26 MMOL/L — SIGNIFICANT CHANGE UP (ref 22–31)
CREAT SERPL-MCNC: 0.67 MG/DL — SIGNIFICANT CHANGE UP (ref 0.5–1.3)
GLUCOSE SERPL-MCNC: 97 MG/DL — SIGNIFICANT CHANGE UP (ref 70–99)
HCT VFR BLD CALC: 35.1 % — SIGNIFICANT CHANGE UP (ref 34.5–45)
HGB BLD-MCNC: 11.3 G/DL — LOW (ref 11.5–15.5)
MCHC RBC-ENTMCNC: 31 PG — SIGNIFICANT CHANGE UP (ref 27–34)
MCHC RBC-ENTMCNC: 32.2 GM/DL — SIGNIFICANT CHANGE UP (ref 32–36)
MCV RBC AUTO: 96.4 FL — SIGNIFICANT CHANGE UP (ref 80–100)
NRBC # BLD: 0 /100 WBCS — SIGNIFICANT CHANGE UP (ref 0–0)
PLATELET # BLD AUTO: 277 K/UL — SIGNIFICANT CHANGE UP (ref 150–400)
POTASSIUM SERPL-MCNC: 4 MMOL/L — SIGNIFICANT CHANGE UP (ref 3.5–5.3)
POTASSIUM SERPL-SCNC: 4 MMOL/L — SIGNIFICANT CHANGE UP (ref 3.5–5.3)
PROT SERPL-MCNC: 6.8 G/DL — SIGNIFICANT CHANGE UP (ref 6–8.3)
RBC # BLD: 3.64 M/UL — LOW (ref 3.8–5.2)
RBC # FLD: 13.9 % — SIGNIFICANT CHANGE UP (ref 10.3–14.5)
SODIUM SERPL-SCNC: 136 MMOL/L — SIGNIFICANT CHANGE UP (ref 135–145)
WBC # BLD: 5.95 K/UL — SIGNIFICANT CHANGE UP (ref 3.8–10.5)
WBC # FLD AUTO: 5.95 K/UL — SIGNIFICANT CHANGE UP (ref 3.8–10.5)

## 2021-08-03 PROCEDURE — 99233 SBSQ HOSP IP/OBS HIGH 50: CPT

## 2021-08-03 RX ORDER — ERTAPENEM SODIUM 1 G/1
1000 INJECTION, POWDER, LYOPHILIZED, FOR SOLUTION INTRAMUSCULAR; INTRAVENOUS ONCE
Refills: 0 | Status: COMPLETED | OUTPATIENT
Start: 2021-08-03 | End: 2021-08-03

## 2021-08-03 RX ORDER — ERTAPENEM SODIUM 1 G/1
1000 INJECTION, POWDER, LYOPHILIZED, FOR SOLUTION INTRAMUSCULAR; INTRAVENOUS EVERY 24 HOURS
Refills: 0 | Status: DISCONTINUED | OUTPATIENT
Start: 2021-08-04 | End: 2021-08-07

## 2021-08-03 RX ORDER — LANOLIN ALCOHOL/MO/W.PET/CERES
3 CREAM (GRAM) TOPICAL AT BEDTIME
Refills: 0 | Status: DISCONTINUED | OUTPATIENT
Start: 2021-08-03 | End: 2021-08-07

## 2021-08-03 RX ORDER — ERTAPENEM SODIUM 1 G/1
INJECTION, POWDER, LYOPHILIZED, FOR SOLUTION INTRAMUSCULAR; INTRAVENOUS
Refills: 0 | Status: DISCONTINUED | OUTPATIENT
Start: 2021-08-03 | End: 2021-08-07

## 2021-08-03 RX ADMIN — Medication 650 MILLIGRAM(S): at 17:08

## 2021-08-03 RX ADMIN — Medication 100 MILLIGRAM(S): at 05:27

## 2021-08-03 RX ADMIN — ERTAPENEM SODIUM 1000 MILLIGRAM(S): 1 INJECTION, POWDER, LYOPHILIZED, FOR SOLUTION INTRAMUSCULAR; INTRAVENOUS at 09:12

## 2021-08-03 RX ADMIN — RANOLAZINE 500 MILLIGRAM(S): 500 TABLET, FILM COATED, EXTENDED RELEASE ORAL at 05:28

## 2021-08-03 RX ADMIN — Medication 650 MILLIGRAM(S): at 18:08

## 2021-08-03 RX ADMIN — Medication 3 MILLIGRAM(S): at 23:48

## 2021-08-03 RX ADMIN — FAMOTIDINE 40 MILLIGRAM(S): 10 INJECTION INTRAVENOUS at 05:29

## 2021-08-03 RX ADMIN — CHLORHEXIDINE GLUCONATE 1 APPLICATION(S): 213 SOLUTION TOPICAL at 13:10

## 2021-08-03 RX ADMIN — Medication 20 MILLIGRAM(S): at 05:29

## 2021-08-03 RX ADMIN — TAMSULOSIN HYDROCHLORIDE 0.4 MILLIGRAM(S): 0.4 CAPSULE ORAL at 21:14

## 2021-08-03 RX ADMIN — FAMOTIDINE 40 MILLIGRAM(S): 10 INJECTION INTRAVENOUS at 17:03

## 2021-08-03 RX ADMIN — RANOLAZINE 500 MILLIGRAM(S): 500 TABLET, FILM COATED, EXTENDED RELEASE ORAL at 17:03

## 2021-08-03 RX ADMIN — ATORVASTATIN CALCIUM 80 MILLIGRAM(S): 80 TABLET, FILM COATED ORAL at 21:14

## 2021-08-03 RX ADMIN — HEPARIN SODIUM 5000 UNIT(S): 5000 INJECTION INTRAVENOUS; SUBCUTANEOUS at 05:27

## 2021-08-03 RX ADMIN — HEPARIN SODIUM 5000 UNIT(S): 5000 INJECTION INTRAVENOUS; SUBCUTANEOUS at 17:03

## 2021-08-03 NOTE — PHYSICAL THERAPY INITIAL EVALUATION ADULT - PERTINENT HX OF CURRENT PROBLEM, REHAB EVAL
76F with h/o Nephrolithiasis s/p lithotripsy x3 s/p nephrostomy, CAD s/p CABGx4, HTN, Chronic bronchitis, emphysema, TYLOR, spinal stenosis, Cervical Ca s/p ROBERTO, s/p Appendectomy, s/p cholecystectomy, s/p left lumpectomy, p/w 4 hours of left flank pain. Pain began suddenly in left flank, feels achey and comes in waves, 10/10 in severity, does not radiate, is associated with nausea but no vomiting, and feels similar to previous kidney stones. A/w Ureteral stone/ Hydronephrosis pending NT

## 2021-08-03 NOTE — PHYSICAL THERAPY INITIAL EVALUATION ADULT - PRECAUTIONS/LIMITATIONS, REHAB EVAL
CONTINUED....CT Abdomen: Moderate left hydroureteronephrosis secondary to a 0.7 cm calculus in the mid pelvic left ureteral segment. Additional renal calculi are identified on the left measuring up to 1.6 cm. Multiple right renal calculi identified measuring up to 9 mm. There is no evidence for right-sided hydronephrosis. CONTINUED....CT Abdomen: Moderate left hydroureteronephrosis secondary to a 0.7 cm calculus in the mid pelvic left ureteral segment. Additional renal calculi are identified on the left measuring up to 1.6 cm. Multiple right renal calculi identified measuring up to 9 mm. There is no evidence for right-sided hydronephrosis./fall precautions

## 2021-08-03 NOTE — PHYSICAL THERAPY INITIAL EVALUATION ADULT - ADDITIONAL COMMENTS
Pt lives alone in [place], no steps to negotiate, pt was independent with all mobility and ADLs PTA- used cane sometimes. Pt drives. Pt owns RW.

## 2021-08-04 LAB
ANION GAP SERPL CALC-SCNC: 11 MMOL/L — SIGNIFICANT CHANGE UP (ref 5–17)
BLD GP AB SCN SERPL QL: NEGATIVE — SIGNIFICANT CHANGE UP
BUN SERPL-MCNC: 17 MG/DL — SIGNIFICANT CHANGE UP (ref 7–23)
CALCIUM SERPL-MCNC: 9.9 MG/DL — SIGNIFICANT CHANGE UP (ref 8.4–10.5)
CHLORIDE SERPL-SCNC: 103 MMOL/L — SIGNIFICANT CHANGE UP (ref 96–108)
CO2 SERPL-SCNC: 25 MMOL/L — SIGNIFICANT CHANGE UP (ref 22–31)
CREAT SERPL-MCNC: 0.68 MG/DL — SIGNIFICANT CHANGE UP (ref 0.5–1.3)
GLUCOSE SERPL-MCNC: 102 MG/DL — HIGH (ref 70–99)
HCT VFR BLD CALC: 35.2 % — SIGNIFICANT CHANGE UP (ref 34.5–45)
HGB BLD-MCNC: 11.3 G/DL — LOW (ref 11.5–15.5)
MCHC RBC-ENTMCNC: 30.9 PG — SIGNIFICANT CHANGE UP (ref 27–34)
MCHC RBC-ENTMCNC: 32.1 GM/DL — SIGNIFICANT CHANGE UP (ref 32–36)
MCV RBC AUTO: 96.2 FL — SIGNIFICANT CHANGE UP (ref 80–100)
NRBC # BLD: 0 /100 WBCS — SIGNIFICANT CHANGE UP (ref 0–0)
PLATELET # BLD AUTO: 308 K/UL — SIGNIFICANT CHANGE UP (ref 150–400)
POTASSIUM SERPL-MCNC: 4 MMOL/L — SIGNIFICANT CHANGE UP (ref 3.5–5.3)
POTASSIUM SERPL-SCNC: 4 MMOL/L — SIGNIFICANT CHANGE UP (ref 3.5–5.3)
RBC # BLD: 3.66 M/UL — LOW (ref 3.8–5.2)
RBC # FLD: 14.2 % — SIGNIFICANT CHANGE UP (ref 10.3–14.5)
RH IG SCN BLD-IMP: POSITIVE — SIGNIFICANT CHANGE UP
SODIUM SERPL-SCNC: 139 MMOL/L — SIGNIFICANT CHANGE UP (ref 135–145)
WBC # BLD: 5.82 K/UL — SIGNIFICANT CHANGE UP (ref 3.8–10.5)
WBC # FLD AUTO: 5.82 K/UL — SIGNIFICANT CHANGE UP (ref 3.8–10.5)

## 2021-08-04 PROCEDURE — 99232 SBSQ HOSP IP/OBS MODERATE 35: CPT

## 2021-08-04 RX ADMIN — FAMOTIDINE 40 MILLIGRAM(S): 10 INJECTION INTRAVENOUS at 05:17

## 2021-08-04 RX ADMIN — Medication 100 MILLIGRAM(S): at 05:17

## 2021-08-04 RX ADMIN — TAMSULOSIN HYDROCHLORIDE 0.4 MILLIGRAM(S): 0.4 CAPSULE ORAL at 21:23

## 2021-08-04 RX ADMIN — CHLORHEXIDINE GLUCONATE 1 APPLICATION(S): 213 SOLUTION TOPICAL at 11:01

## 2021-08-04 RX ADMIN — RANOLAZINE 500 MILLIGRAM(S): 500 TABLET, FILM COATED, EXTENDED RELEASE ORAL at 17:15

## 2021-08-04 RX ADMIN — ERTAPENEM SODIUM 120 MILLIGRAM(S): 1 INJECTION, POWDER, LYOPHILIZED, FOR SOLUTION INTRAMUSCULAR; INTRAVENOUS at 11:01

## 2021-08-04 RX ADMIN — HEPARIN SODIUM 5000 UNIT(S): 5000 INJECTION INTRAVENOUS; SUBCUTANEOUS at 17:15

## 2021-08-04 RX ADMIN — FAMOTIDINE 40 MILLIGRAM(S): 10 INJECTION INTRAVENOUS at 17:15

## 2021-08-04 RX ADMIN — HEPARIN SODIUM 5000 UNIT(S): 5000 INJECTION INTRAVENOUS; SUBCUTANEOUS at 05:16

## 2021-08-04 RX ADMIN — Medication 20 MILLIGRAM(S): at 05:18

## 2021-08-04 RX ADMIN — ATORVASTATIN CALCIUM 80 MILLIGRAM(S): 80 TABLET, FILM COATED ORAL at 21:24

## 2021-08-04 RX ADMIN — RANOLAZINE 500 MILLIGRAM(S): 500 TABLET, FILM COATED, EXTENDED RELEASE ORAL at 05:17

## 2021-08-04 RX ADMIN — Medication 3 MILLIGRAM(S): at 21:23

## 2021-08-05 ENCOUNTER — NON-APPOINTMENT (OUTPATIENT)
Age: 76
End: 2021-08-05

## 2021-08-05 LAB
ALBUMIN SERPL ELPH-MCNC: 3.2 G/DL — LOW (ref 3.3–5)
ALP SERPL-CCNC: 83 U/L — SIGNIFICANT CHANGE UP (ref 40–120)
ALT FLD-CCNC: 11 U/L — SIGNIFICANT CHANGE UP (ref 10–45)
ANION GAP SERPL CALC-SCNC: 13 MMOL/L — SIGNIFICANT CHANGE UP (ref 5–17)
APTT BLD: 29.9 SEC — SIGNIFICANT CHANGE UP (ref 27.5–35.5)
AST SERPL-CCNC: 13 U/L — SIGNIFICANT CHANGE UP (ref 10–40)
BASOPHILS # BLD AUTO: 0.02 K/UL — SIGNIFICANT CHANGE UP (ref 0–0.2)
BASOPHILS NFR BLD AUTO: 0.4 % — SIGNIFICANT CHANGE UP (ref 0–2)
BILIRUB SERPL-MCNC: 0.2 MG/DL — SIGNIFICANT CHANGE UP (ref 0.2–1.2)
BUN SERPL-MCNC: 21 MG/DL — SIGNIFICANT CHANGE UP (ref 7–23)
CALCIUM SERPL-MCNC: 9.6 MG/DL — SIGNIFICANT CHANGE UP (ref 8.4–10.5)
CHLORIDE SERPL-SCNC: 100 MMOL/L — SIGNIFICANT CHANGE UP (ref 96–108)
CO2 SERPL-SCNC: 25 MMOL/L — SIGNIFICANT CHANGE UP (ref 22–31)
CREAT SERPL-MCNC: 0.7 MG/DL — SIGNIFICANT CHANGE UP (ref 0.5–1.3)
EOSINOPHIL # BLD AUTO: 0.13 K/UL — SIGNIFICANT CHANGE UP (ref 0–0.5)
EOSINOPHIL NFR BLD AUTO: 2.4 % — SIGNIFICANT CHANGE UP (ref 0–6)
GLUCOSE SERPL-MCNC: 95 MG/DL — SIGNIFICANT CHANGE UP (ref 70–99)
HCT VFR BLD CALC: 35.3 % — SIGNIFICANT CHANGE UP (ref 34.5–45)
HGB BLD-MCNC: 11.4 G/DL — LOW (ref 11.5–15.5)
IMM GRANULOCYTES NFR BLD AUTO: 0.7 % — SIGNIFICANT CHANGE UP (ref 0–1.5)
INR BLD: 1.04 RATIO — SIGNIFICANT CHANGE UP (ref 0.88–1.16)
LYMPHOCYTES # BLD AUTO: 1.72 K/UL — SIGNIFICANT CHANGE UP (ref 1–3.3)
LYMPHOCYTES # BLD AUTO: 31.4 % — SIGNIFICANT CHANGE UP (ref 13–44)
MAGNESIUM SERPL-MCNC: 2 MG/DL — SIGNIFICANT CHANGE UP (ref 1.6–2.6)
MCHC RBC-ENTMCNC: 31 PG — SIGNIFICANT CHANGE UP (ref 27–34)
MCHC RBC-ENTMCNC: 32.3 GM/DL — SIGNIFICANT CHANGE UP (ref 32–36)
MCV RBC AUTO: 95.9 FL — SIGNIFICANT CHANGE UP (ref 80–100)
MONOCYTES # BLD AUTO: 0.56 K/UL — SIGNIFICANT CHANGE UP (ref 0–0.9)
MONOCYTES NFR BLD AUTO: 10.2 % — SIGNIFICANT CHANGE UP (ref 2–14)
NEUTROPHILS # BLD AUTO: 3.01 K/UL — SIGNIFICANT CHANGE UP (ref 1.8–7.4)
NEUTROPHILS NFR BLD AUTO: 54.9 % — SIGNIFICANT CHANGE UP (ref 43–77)
NRBC # BLD: 0 /100 WBCS — SIGNIFICANT CHANGE UP (ref 0–0)
PLATELET # BLD AUTO: 332 K/UL — SIGNIFICANT CHANGE UP (ref 150–400)
POTASSIUM SERPL-MCNC: 4.1 MMOL/L — SIGNIFICANT CHANGE UP (ref 3.5–5.3)
POTASSIUM SERPL-SCNC: 4.1 MMOL/L — SIGNIFICANT CHANGE UP (ref 3.5–5.3)
PROT SERPL-MCNC: 6.8 G/DL — SIGNIFICANT CHANGE UP (ref 6–8.3)
PROTHROM AB SERPL-ACNC: 12.4 SEC — SIGNIFICANT CHANGE UP (ref 10.6–13.6)
RBC # BLD: 3.68 M/UL — LOW (ref 3.8–5.2)
RBC # FLD: 14.3 % — SIGNIFICANT CHANGE UP (ref 10.3–14.5)
SODIUM SERPL-SCNC: 138 MMOL/L — SIGNIFICANT CHANGE UP (ref 135–145)
WBC # BLD: 5.48 K/UL — SIGNIFICANT CHANGE UP (ref 3.8–10.5)
WBC # FLD AUTO: 5.48 K/UL — SIGNIFICANT CHANGE UP (ref 3.8–10.5)

## 2021-08-05 PROCEDURE — 50432 PLMT NEPHROSTOMY CATHETER: CPT | Mod: LT

## 2021-08-05 PROCEDURE — 99232 SBSQ HOSP IP/OBS MODERATE 35: CPT

## 2021-08-05 RX ORDER — ACETAMINOPHEN 500 MG
1000 TABLET ORAL ONCE
Refills: 0 | Status: COMPLETED | OUTPATIENT
Start: 2021-08-05 | End: 2021-08-05

## 2021-08-05 RX ORDER — ONDANSETRON 8 MG/1
4 TABLET, FILM COATED ORAL ONCE
Refills: 0 | Status: DISCONTINUED | OUTPATIENT
Start: 2021-08-05 | End: 2021-08-07

## 2021-08-05 RX ADMIN — RANOLAZINE 500 MILLIGRAM(S): 500 TABLET, FILM COATED, EXTENDED RELEASE ORAL at 06:45

## 2021-08-05 RX ADMIN — FAMOTIDINE 40 MILLIGRAM(S): 10 INJECTION INTRAVENOUS at 06:45

## 2021-08-05 RX ADMIN — HEPARIN SODIUM 5000 UNIT(S): 5000 INJECTION INTRAVENOUS; SUBCUTANEOUS at 17:25

## 2021-08-05 RX ADMIN — ERTAPENEM SODIUM 120 MILLIGRAM(S): 1 INJECTION, POWDER, LYOPHILIZED, FOR SOLUTION INTRAMUSCULAR; INTRAVENOUS at 08:22

## 2021-08-05 RX ADMIN — FAMOTIDINE 40 MILLIGRAM(S): 10 INJECTION INTRAVENOUS at 17:25

## 2021-08-05 RX ADMIN — ATORVASTATIN CALCIUM 80 MILLIGRAM(S): 80 TABLET, FILM COATED ORAL at 21:01

## 2021-08-05 RX ADMIN — RANOLAZINE 500 MILLIGRAM(S): 500 TABLET, FILM COATED, EXTENDED RELEASE ORAL at 17:25

## 2021-08-05 RX ADMIN — CHLORHEXIDINE GLUCONATE 1 APPLICATION(S): 213 SOLUTION TOPICAL at 11:06

## 2021-08-05 RX ADMIN — Medication 1000 MILLIGRAM(S): at 21:30

## 2021-08-05 RX ADMIN — Medication 650 MILLIGRAM(S): at 15:15

## 2021-08-05 RX ADMIN — Medication 100 MILLIGRAM(S): at 06:41

## 2021-08-05 RX ADMIN — Medication 400 MILLIGRAM(S): at 21:01

## 2021-08-05 RX ADMIN — TAMSULOSIN HYDROCHLORIDE 0.4 MILLIGRAM(S): 0.4 CAPSULE ORAL at 21:01

## 2021-08-05 RX ADMIN — Medication 20 MILLIGRAM(S): at 06:41

## 2021-08-05 RX ADMIN — Medication 3 MILLIGRAM(S): at 21:01

## 2021-08-05 NOTE — PRE-ANESTHESIA EVALUATION ADULT - BP NONINVASIVE MEAN (MM HG)
72 O-T Advancement Flap Text: The defect edges were debeveled with a #15 scalpel blade.  Given the location of the defect, shape of the defect and the proximity to free margins an O-T advancement flap was deemed most appropriate.  Using a sterile surgical marker, an appropriate advancement flap was drawn incorporating the defect and placing the expected incisions within the relaxed skin tension lines where possible.    The area thus outlined was incised deep to adipose tissue with a #15 scalpel blade.  The skin margins were undermined to an appropriate distance in all directions utilizing iris scissors.

## 2021-08-05 NOTE — PRE PROCEDURE NOTE - PRE PROCEDURE EVALUATION
Interventional Radiology    HPI: 76y Female with PMH CAD on ASA/Effient with left renal stone and left hydronephrosis referred for PCN for decompression and possible PCNL by urology.    Allergies:   Medications (Abx/Cardiac/Anticoagulation/Blood Products)    ertapenem  IVPB: 120 mL/Hr IV Intermittent (08-05 @ 08:22)  furosemide    Tablet: 20 milliGRAM(s) Oral (08-05 @ 06:41)  heparin   Injectable: 5000 Unit(s) SubCutaneous (08-04 @ 17:15)  metoprolol succinate ER: 100 milliGRAM(s) Oral (08-05 @ 06:41)  ranolazine: 500 milliGRAM(s) Oral (08-05 @ 06:45)  tamsulosin: 0.4 milliGRAM(s) Oral (08-04 @ 21:23)    Data:    T(C): 36.8  HR: 80  BP: 160/88  RR: 20  SpO2: 97%    Exam  General: No acute distress  Chest: Non labored breathing  Abdomen: Non-distended  Extremities: No swelling, warm    -WBC 5.48 / HgB 11.4 / Hct 35.3 / Plt 332  -Na 138 / Cl 100 / BUN 21 / Glucose 95  -K 4.1 / CO2 25 / Cr 0.70  -ALT 11 / Alk Phos 83 / T.Bili 0.2  -INR1.04    Imaging:     Plan: 76y Female with CAD on ASA/Effient with left renal stone and left hydronephrosis referred for PCN for decompression and possible PCNL by urology.  - Plan for left PCN  - ASA/Effient held since 7/31    -Risks/Benefits/alternatives explained with the patient and/or healthcare proxy and witnessed informed consent obtained.

## 2021-08-05 NOTE — PROCEDURE NOTE - PROCEDURE FINDINGS AND DETAILS
Vascular & Interventional Radiology Post-Procedure Note    Pre-Procedure Diagnosis: Hydronephrosis, obstructive uropathy  Post-Procedure Diagnosis: Same as pre.  Indications for Procedure: Hydronephrosis.    Attending: Dr. Kirkland  Resident: Dr. Reynolds    Procedure Details/Findings:   1.) Successful 8.5 Fr left percutaneous nephrostomy catheter placement.  Access (if applicable): NA    Complications: None  Estimated Blood Loss: Minimal  Specimen: 5 cc pink urine  Contrast: 7 cc Omni  Sedation: IV sedation  Patient Condition/Disposition: Stable/Recovery then back to floor     Plan:  1.) Left nephrostomy to gravity drainage  2.) Monitor nephrostomy output  3.) Flush with 5 cc normal saline once daily  4.) Keep dressing clean and dry  5.) Rest as per primary team

## 2021-08-06 ENCOUNTER — TRANSCRIPTION ENCOUNTER (OUTPATIENT)
Age: 76
End: 2021-08-06

## 2021-08-06 LAB
CULTURE RESULTS: SIGNIFICANT CHANGE UP
CULTURE RESULTS: SIGNIFICANT CHANGE UP
HCT VFR BLD CALC: 34.4 % — LOW (ref 34.5–45)
HGB BLD-MCNC: 11.1 G/DL — LOW (ref 11.5–15.5)
MCHC RBC-ENTMCNC: 31 PG — SIGNIFICANT CHANGE UP (ref 27–34)
MCHC RBC-ENTMCNC: 32.3 GM/DL — SIGNIFICANT CHANGE UP (ref 32–36)
MCV RBC AUTO: 96.1 FL — SIGNIFICANT CHANGE UP (ref 80–100)
NRBC # BLD: 0 /100 WBCS — SIGNIFICANT CHANGE UP (ref 0–0)
PLATELET # BLD AUTO: 287 K/UL — SIGNIFICANT CHANGE UP (ref 150–400)
RBC # BLD: 3.58 M/UL — LOW (ref 3.8–5.2)
RBC # FLD: 14.2 % — SIGNIFICANT CHANGE UP (ref 10.3–14.5)
SPECIMEN SOURCE: SIGNIFICANT CHANGE UP
SPECIMEN SOURCE: SIGNIFICANT CHANGE UP
WBC # BLD: 8.53 K/UL — SIGNIFICANT CHANGE UP (ref 3.8–10.5)
WBC # FLD AUTO: 8.53 K/UL — SIGNIFICANT CHANGE UP (ref 3.8–10.5)

## 2021-08-06 PROCEDURE — 99232 SBSQ HOSP IP/OBS MODERATE 35: CPT

## 2021-08-06 RX ADMIN — Medication 650 MILLIGRAM(S): at 08:38

## 2021-08-06 RX ADMIN — TAMSULOSIN HYDROCHLORIDE 0.4 MILLIGRAM(S): 0.4 CAPSULE ORAL at 22:38

## 2021-08-06 RX ADMIN — HEPARIN SODIUM 5000 UNIT(S): 5000 INJECTION INTRAVENOUS; SUBCUTANEOUS at 05:40

## 2021-08-06 RX ADMIN — Medication 100 MILLIGRAM(S): at 05:40

## 2021-08-06 RX ADMIN — RANOLAZINE 500 MILLIGRAM(S): 500 TABLET, FILM COATED, EXTENDED RELEASE ORAL at 17:41

## 2021-08-06 RX ADMIN — Medication 3 MILLIGRAM(S): at 22:37

## 2021-08-06 RX ADMIN — FAMOTIDINE 40 MILLIGRAM(S): 10 INJECTION INTRAVENOUS at 05:40

## 2021-08-06 RX ADMIN — RANOLAZINE 500 MILLIGRAM(S): 500 TABLET, FILM COATED, EXTENDED RELEASE ORAL at 05:40

## 2021-08-06 RX ADMIN — CHLORHEXIDINE GLUCONATE 1 APPLICATION(S): 213 SOLUTION TOPICAL at 11:20

## 2021-08-06 RX ADMIN — Medication 650 MILLIGRAM(S): at 09:30

## 2021-08-06 RX ADMIN — HEPARIN SODIUM 5000 UNIT(S): 5000 INJECTION INTRAVENOUS; SUBCUTANEOUS at 17:41

## 2021-08-06 RX ADMIN — FAMOTIDINE 40 MILLIGRAM(S): 10 INJECTION INTRAVENOUS at 17:41

## 2021-08-06 RX ADMIN — Medication 20 MILLIGRAM(S): at 05:40

## 2021-08-06 RX ADMIN — ERTAPENEM SODIUM 120 MILLIGRAM(S): 1 INJECTION, POWDER, LYOPHILIZED, FOR SOLUTION INTRAMUSCULAR; INTRAVENOUS at 08:37

## 2021-08-06 RX ADMIN — ATORVASTATIN CALCIUM 80 MILLIGRAM(S): 80 TABLET, FILM COATED ORAL at 22:38

## 2021-08-06 NOTE — PROGRESS NOTE ADULT - TIME BILLING
CV stable  -abx per med/urology  -Planned for IR placement of nephrostomy tube today   -asa and effient on hold   -resume at least ASA as soon as feasible for hx of CABG, recent PCI  dvt ppx
Pt seen and examined, agree with the above assessment and plan by ERIN Jett.  CV stable  -abx per med/urology  -Planned for IR placement of nephrostomy tube  -asa and effient on hold   -resume at least ASA as soon as feasible for hx of CABG, recent PCI  --EKG without acute ischemic changes - pt remains cardiac optimized to proceed with acceptable cardiac risk    dvt ppx
Pt seen and examined, agree with the above assessment and plan by ERIN Jett.  CV stable  -abx per med/urology  -Planned for IR placement of nephrostomy tube  -asa and effient on hold   -resume at least ASA as soon as feasible for hx of CABG, recent PCI  -check EKG  -assuming unremarkable ECG, remains cardiac optimized to proceed with acceptable cardiac risk     dvt ppx
CV stable  s/p IR placement of nephrostomy tube  asa and effient on hold   NEEDS TO RESUME resume at least ASA as soon as feasible for hx of CABG, recent PCI  d/c planning
Pt seen and examined, agree with the above assessment and plan by ERIN Jett.  CV stable  -abx per med/urology  -Planned for IR placement of nephrostomy tube  -asa and effient on hold   -resume at least ASA as soon as feasible for hx of CABG, recent PCI  --EKG without acute ischemic changes - pt remains cardiac optimized to proceed with acceptable cardiac risk    dvt ppx

## 2021-08-06 NOTE — CONSULT NOTE ADULT - CONSULT REQUESTED DATE/TIME
06-Aug-2021 17:08
30-Jul-2021 23:04
31-Jul-2021 15:09
01-Aug-2021 11:18
01-Aug-2021 14:00
31-Jul-2021

## 2021-08-06 NOTE — DISCHARGE NOTE PROVIDER - PROVIDER TOKENS
PROVIDER:[TOKEN:[[id number]]],PROVIDER:[TOKEN:[[id number]]],PROVIDER:[TOKEN:[[id number]]]

## 2021-08-06 NOTE — DISCHARGE NOTE NURSING/CASE MANAGEMENT/SOCIAL WORK - PATIENT PORTAL LINK FT
You can access the FollowMyHealth Patient Portal offered by [facility] by registering at the following website: [email or web address]. By joining [facility]’s FollowMyHealth portal, you will also be able to view your health information using other applications (apps) compatible with our system.

## 2021-08-06 NOTE — DISCHARGE NOTE PROVIDER - NSDCMRMEDTOKEN_GEN_ALL_CORE_FT
aspirin 81 mg oral tablet: orally once a day  famotidine 40 mg oral tablet: 1 tab(s) orally 2 times a day  furosemide 20 mg oral tablet: 1 tab(s) orally once a day  Metoprolol Succinate  mg oral tablet, extended release: 1 tab(s) orally once a day (at bedtime)  prasugrel 5 mg oral tablet: 1 tab(s) orally once a day  ranolazine 500 mg oral tablet, extended release: 1 tab(s) orally 2 times a day  rosuvastatin 20 mg oral tablet: 1 tab(s) orally once a day (at bedtime)   acetaminophen 325 mg oral tablet: 2 tab(s) orally every 6 hours, As needed, Temp greater or equal to 38C (100.4F), Mild Pain (1 - 3)  aspirin 81 mg oral tablet: orally once a day  Bactrim  mg-160 mg oral tablet: 1 tab(s) orally 2 times a day   famotidine 40 mg oral tablet: 1 tab(s) orally 2 times a day  furosemide 20 mg oral tablet: 1 tab(s) orally once a day  Metoprolol Succinate  mg oral tablet, extended release: 1 tab(s) orally once a day (at bedtime)  prasugrel 5 mg oral tablet: 1 tab(s) orally once a day  ranolazine 500 mg oral tablet, extended release: 1 tab(s) orally 2 times a day  rosuvastatin 20 mg oral tablet: 1 tab(s) orally once a day (at bedtime)  tamsulosin 0.4 mg oral capsule: 1 cap(s) orally once a day (at bedtime)

## 2021-08-06 NOTE — DISCHARGE NOTE NURSING/CASE MANAGEMENT/SOCIAL WORK - NSDCPEEMAIL_GEN_ALL_CORE
[facility] email [email or web address]

## 2021-08-06 NOTE — DISCHARGE NOTE PROVIDER - CARE PROVIDERS DIRECT ADDRESSES
,DirectAddress_Unknown,DirectAddress_Unknown,[email or web address]

## 2021-08-06 NOTE — CONSULT NOTE ADULT - CONSULT REASON
nephrolithiasis  UTI
left nephrostomy tube placement
preop
Left flank pain
ingrown toenails
renal colic

## 2021-08-06 NOTE — DISCHARGE NOTE PROVIDER - CARE PROVIDER_API CALL
Dwight Broderick)  Urology  [address] [place] [address]  Phone: [phone]  Fax: [phone]  Follow Up Time:     Nnamdi Kirkland)  Radiology  [address] [place] [address]  Phone: [phone]  Fax: [phone]  Follow Up Time:     Scarlet Kuhn)  Internal Medicine  [address] [place] [id number]  Phone: [phone]  Fax: [phone]  Follow Up Time:

## 2021-08-06 NOTE — DISCHARGE NOTE PROVIDER - HOSPITAL COURSE
bronchitis, emphysema, TYLOR, spinal stenosis, Cervical Ca s/p ROBERTO, s/p Appendectomy, s/p cholecystectomy, s/p left lumpectomy, p/w 4 hours of left flank pain. Pain began suddenly in left flank, feels achey and comes in waves, 10/10 in severity, does not radiate, is associated with nausea but no vomiting, and feels similar to previous kidney stones.   CT with partially obstructing erica  seen by urology  observation  denies dysuria but pyuria  no fevers chills or s/s dissemination  No recent abx but isolate ESBL though S to bactrim    rec:  A) Nephrolithiasis  Pyuria   UTI  s/p PCN  has been on Invanz already 4 days  Can change to po bactrim on DC   continue X 6 more days  will recommend brett-operative prophylaxis around stone extraction     B) CABG, COPD  will defer to primary  pt not vaccinated for COVID  Vaccination again encouraged   bronchitis, emphysema, TYLOR, spinal stenosis, Cervical Ca s/p ROBERTO, s/p Appendectomy, s/p cholecystectomy, s/p left lumpectomy, p/w 4 hours of left flank pain. Pain began suddenly in left flank, feels achey and comes in waves, 10/10 in severity, does not radiate, is associated with nausea but no vomiting, and feels similar to previous kidney stones.   CT with partially obstructing erica  seen by urology  observation  denies dysuria but pyuria  no fevers chills or s/s dissemination  No recent abx but isolate ESBL though S to bactrim    rec:  A) Nephrolithiasis  Pyuria   UTI  s/p PCN  has been on Invanz already 4 days  Can change to po bactrim on DC   continue X 6 more days  will recommend brett-operative prophylaxis around stone extraction     B) CABG, COPD  will defer to primary  pt not vaccinated for COVID  Vaccination again encouraged    Assessment and Plan:   · Assessment    76F with renal stone, hydronephrosis s/p PCN palcement  -tube in tact and draining well  -Abx plan per ID  -Definitive stone management as outpatient. No  contraindication to hospital discharge     76 F with PMhx of bronchitis, emphysema, TYLOR, spinal stenosis, Cervical Ca s/p ROBERTO, s/p Appendectomy, s/p cholecystectomy, s/p left lumpectomy, p/w 4 hours of left flank pain. Pain began suddenly in left flank, feels achey and comes in waves, 10/10 in severity, does not radiate, is associated with nausea but no vomiting, and feels similar to previous kidney stones.   CT with partially obstructing stone  seen by urology-Definitive stone management as outpatient.  denies dysuria but pyuria  no fevers chills or s/s dissemination  No recent abx but isolate ESBL though S to bactrim     Nephrolithiasis  s/p PCN, pt to follow up with IR for management of NT   Pt to follow up with Urology as outpt for further management      UTI- with Kleb pneum. , ID c/s appreciated   has been on Invanz already 4 days- Can change to po bactrim on DC ;continue X 6 more days  recommend brett-operative prophylaxis around stone extraction     CAD (coronary artery disease)   - continue Rx   - restart ASA, Effient -cleared by IR.  - Cardiology follow dr. Briggs     Chronic bronchitis   - stable    HTN (hypertension)  - controlled  Pt medically stable for discharge and to follow up with her Urologisy, IR, cards, and PCP

## 2021-08-06 NOTE — DISCHARGE NOTE PROVIDER - NSDCCPCAREPLAN_GEN_ALL_CORE_FT
PRINCIPAL DISCHARGE DIAGNOSIS  Diagnosis: Urinary tract infection  Assessment and Plan of Treatment:       SECONDARY DISCHARGE DIAGNOSES  Diagnosis: Nephrolithiasis  Assessment and Plan of Treatment:      PRINCIPAL DISCHARGE DIAGNOSIS  Diagnosis: Urinary tract infection  Assessment and Plan of Treatment: HOME CARE INSTRUCTIONS  f you were prescribed antibiotics, take them exactly as your caregiver instructs you. Finish the medication even if you feel better after you have only taken some of the medication.  Drink enough water and fluids to keep your urine clear or pale yellow.  Avoid caffeine, tea, and carbonated beverages. They tend to irritate your bladder.  Empty your bladder often. Avoid holding urine for long periods of time.  Empty your bladder before and after sexual intercourse.  After a bowel movement, women should cleanse from front to back. Use each tissue only once.  SEEK MEDICAL CARE IF:  You have back pain.  You develop a fever.  Your symptoms do not begin to resolve within 3 days.  SEEK IMMEDIATE MEDICAL CARE IF:  You have severe back pain or lower abdominal pain.  You develop chills.  You have nausea or vomiting.  You have continued burning or discomfort with urination.        SECONDARY DISCHARGE DIAGNOSES  Diagnosis: Nephrolithiasis  Assessment and Plan of Treatment: Please follow up with your Urologist for management of kidney stone.   You may require prophylaxic antibiotic prior to removal of stone. Please discuss this with your Urologist   Placement of Nephrostomy tube was placed due to the kidney stone.   -Please follow up with Interventional Radiology,   (IR booking office )at [phone]; recommend IR follow in 3 months for tube evaluation and routine exchange.  Nephrostomy Tube Care:   -flush drain with 5cc NS daily forward only; DO NOT aspirate  -change dressing q3 days or when dressing is saturated      Diagnosis: Coronary artery disease with other form of angina pectoris  Assessment and Plan of Treatment: Coronary artery disease is a condition where the arteries the supply the heart muscle get clogges with fatty deposits & puts you at risk for a heart attack  Call your doctor if you have any new pain, pressure, or discomfort in the center of your chest, pain, tingling or discomfort in arms, back, neck, jaw, or stomach, shortness of breath, nausea, vomiting, burping or heartburn, sweating, cold and clammy skin, racing or abnormal heartbeat for more than 10 minutes or if they keep coming & going.  Call 911 and do not tr to get to hospital by care  You can help yourself with lefestyle changes (quitting smoking if you smoke), eat lots of fruits & vegetables & low fat dairy products, not a lot of meat & fatty foods, walk or some form of physical activity most days of the week, lose weight if you are overweight  Take your cardiac medication as prescribed to lower cholesterol, to lower blood pressure, aspirin to prevent blood clots, and diabetes control  Make sure to keep appointments with doctor for cardiac follow up care      Diagnosis: Benign essential HTN  Assessment and Plan of Treatment: Low salt diet  Activity as tolerated.  Take all medication as prescribed.  Follow up with your medical doctor for routine blood pressure monitoring at your next visit.  Notify your doctor if you have any of the following symptoms:   Dizziness, Lightheadedness, Blurry vision, Headache, Chest pain, Shortness of breath       PRINCIPAL DISCHARGE DIAGNOSIS  Diagnosis: Urinary tract infection  Assessment and Plan of Treatment: HOME CARE INSTRUCTIONS  f you were prescribed antibiotics, take them exactly as your caregiver instructs you. Finish the medication even if you feel better after you have only taken some of the medication.  Drink enough water and fluids to keep your urine clear or pale yellow.  Avoid caffeine, tea, and carbonated beverages. They tend to irritate your bladder.  Empty your bladder often. Avoid holding urine for long periods of time.  Empty your bladder before and after sexual intercourse.  After a bowel movement, women should cleanse from front to back. Use each tissue only once.  SEEK MEDICAL CARE IF:  You have back pain.  You develop a fever.  Your symptoms do not begin to resolve within 3 days.  SEEK IMMEDIATE MEDICAL CARE IF:  You have severe back pain or lower abdominal pain.  You develop chills.  You have nausea or vomiting.  You have continued burning or discomfort with urination.        SECONDARY DISCHARGE DIAGNOSES  Diagnosis: Nephrolithiasis  Assessment and Plan of Treatment: Please follow up with your Urologist for management of kidney stone.   You may require prophylaxic antibiotic prior to removal of stone. Please discuss this with your Urologist   Placement of Nephrostomy tube was placed due to the kidney stone.   -Please follow up with Interventional Radiology,   (IR booking office )at [phone]; recommend IR follow in 3 months for tube evaluation and routine exchange.  Nephrostomy Tube Care:   -flush drain with 5cc NS daily forward only; DO NOT aspirate  -change dressing q3 days or when dressing is saturated  If your having any pain that is worse, redness or puss at insertion site, fever, or no urine coming out of the neprostomy tube, please RETURN TO THE HOSPITAL.       Diagnosis: Coronary artery disease with other form of angina pectoris  Assessment and Plan of Treatment: Coronary artery disease is a condition where the arteries the supply the heart muscle get clogges with fatty deposits & puts you at risk for a heart attack  Call your doctor if you have any new pain, pressure, or discomfort in the center of your chest, pain, tingling or discomfort in arms, back, neck, jaw, or stomach, shortness of breath, nausea, vomiting, burping or heartburn, sweating, cold and clammy skin, racing or abnormal heartbeat for more than 10 minutes or if they keep coming & going.  Call 911 and do not tr to get to hospital by care  You can help yourself with lefestyle changes (quitting smoking if you smoke), eat lots of fruits & vegetables & low fat dairy products, not a lot of meat & fatty foods, walk or some form of physical activity most days of the week, lose weight if you are overweight  Take your cardiac medication as prescribed to lower cholesterol, to lower blood pressure, aspirin to prevent blood clots, and diabetes control  Make sure to keep appointments with doctor for cardiac follow up care      Diagnosis: Benign essential HTN  Assessment and Plan of Treatment: Low salt diet  Activity as tolerated.  Take all medication as prescribed.  Follow up with your medical doctor for routine blood pressure monitoring at your next visit.  Notify your doctor if you have any of the following symptoms:   Dizziness, Lightheadedness, Blurry vision, Headache, Chest pain, Shortness of breath

## 2021-08-06 NOTE — CONSULT NOTE ADULT - ASSESSMENT
Patient seen and evaluated   - toenails debrided x10 using sterile nippers  - all offending ingrowing nail boarders excised   - patient educated on proper foot care and importance of regular examinations   - recommend z flow boots at all times while in bed  - follow up as outpatient for routine care  - thank you for the consult

## 2021-08-06 NOTE — DISCHARGE NOTE NURSING/CASE MANAGEMENT/SOCIAL WORK - NSDCPEWEB_GEN_ALL_CORE
[facility] website --- [email or web address] --- [email or web address]

## 2021-08-06 NOTE — CONSULT NOTE ADULT - SUBJECTIVE AND OBJECTIVE BOX
Patient is a 76y old  Female who presents with a chief complaint of renal colic (01 Aug 2021 10:02)      HPI:  76F with h/o Nephrolithiasis s/p lithotripsy x3 s/p nephrostomy, CAD s/p CABGx4, HTN, Chronic bronchitis, emphysema, TYLOR, spinal stenosis, Cervical Ca s/p ROBERTO, s/p Appendectomy, s/p cholecystectomy, s/p left lumpectomy, p/w 4 hours of left flank pain. Pain began suddenly in left flank, feels achey and comes in waves, 10/10 in severity, does not radiate, is associated with nausea but no vomiting, and feels similar to previous kidney stones.  Denies dysuria, hematuria, fever/chills, trauma, CP, SOB, Dizziness. (31 Jul 2021 12:21)    Podiatry consulted for painful ingrown toenails bilat.    PAST MEDICAL & SURGICAL HISTORY:  Malignant neoplasm of cervix, unspecified site  s/p ROBERTO 1978    Smoker  1/2 pack X 55 years    HTN (hypertension)  diagnosed 10/2017    Emphysema lung  mild, on CT scan 2017    Proteinuria    Renal calculus, bilateral    Morbid obesity with BMI of 40.0-44.9, adult    TYLOR (obstructive sleep apnea)  as per [facility] criteria    Lumbar herniated disc    Spinal stenosis of lumbar region    CAD (coronary artery disease)    Chronic bronchitis    Cataract    History of appendectomy  1954    H/O abdominal hysterectomy  ROBERTO 1978    H/O lithotripsy  [facility] 1986 left, 2004 right, 2007 left    S/P breast lumpectomy  left, benign    Prolapse, uterovaginal  s/p TVT Sling 2000    S/P cholecystectomy  2005    S/P ERCP  2006    S/P cataract surgery  bilateral, 2015    S/P CABG x 4        MEDICATIONS  (STANDING):  atorvastatin 80 milliGRAM(s) Oral at bedtime  chlorhexidine 2% Cloths 1 Application(s) Topical daily  ertapenem  IVPB      ertapenem  IVPB 1000 milliGRAM(s) IV Intermittent every 24 hours  famotidine    Tablet 40 milliGRAM(s) Oral two times a day  furosemide    Tablet 20 milliGRAM(s) Oral daily  heparin   Injectable 5000 Unit(s) SubCutaneous every 12 hours  melatonin 3 milliGRAM(s) Oral at bedtime  metoprolol succinate  milliGRAM(s) Oral daily  ranolazine 500 milliGRAM(s) Oral two times a day  sodium chloride 0.9%. 1000 milliLiter(s) (75 mL/Hr) IV Continuous <Continuous>  tamsulosin 0.4 milliGRAM(s) Oral at bedtime    MEDICATIONS  (PRN):  acetaminophen   Tablet .. 650 milliGRAM(s) Oral every 6 hours PRN Temp greater or equal to 38C (100.4F), Mild Pain (1 - 3)  ondansetron Injectable 4 milliGRAM(s) IV Push once PRN Nausea and/or Vomiting      Allergies    No Known Allergies    Intolerances        VITALS:    Vital Signs Last 24 Hrs  T(C): 36.7 (06 Aug 2021 15:48), Max: 37 (06 Aug 2021 08:31)  T(F): 98.1 (06 Aug 2021 15:48), Max: 98.6 (06 Aug 2021 08:31)  HR: 88 (06 Aug 2021 15:48) (87 - 90)  BP: 124/69 (06 Aug 2021 15:48) (96/59 - 151/79)  BP(mean): --  RR: 18 (06 Aug 2021 15:48) (18 - 18)  SpO2: 93% (06 Aug 2021 15:48) (92% - 93%)    LABS:                          11.1   8.53  )-----------( 287      ( 06 Aug 2021 07:11 )             34.4       08-05    138  |  100  |  21  ----------------------------<  95  4.1   |  25  |  0.70    Ca    9.6      05 Aug 2021 07:07  Mg     2.0     08-05    TPro  6.8  /  Alb  3.2<L>  /  TBili  0.2  /  DBili  x   /  AST  13  /  ALT  11  /  AlkPhos  83  08-05      CAPILLARY BLOOD GLUCOSE          PT/INR - ( 05 Aug 2021 07:09 )   PT: 12.4 sec;   INR: 1.04 ratio         PTT - ( 05 Aug 2021 07:09 )  PTT:29.9 sec    LOWER EXTREMITY PHYSICAL EXAM:    Vasular: DP/PT 1/4, B/L, CFT <3 seconds B/L, Temperature gradient WNL, B/L.   Neuro: Epicritic sensation intact to the level of toes, B/L.  Musculoskeletal/Ortho: hammertoes bilat 2-5 toes, pain with palpation of toes bilat  Skin: toenails thickened elongated dystrophic with ingrown hallux toenails bilat

## 2021-08-07 ENCOUNTER — FORM ENCOUNTER (OUTPATIENT)
Age: 76
End: 2021-08-07

## 2021-08-07 VITALS
SYSTOLIC BLOOD PRESSURE: 169 MMHG | DIASTOLIC BLOOD PRESSURE: 97 MMHG | RESPIRATION RATE: 18 BRPM | TEMPERATURE: 98 F | HEART RATE: 72 BPM | OXYGEN SATURATION: 96 %

## 2021-08-07 LAB
ANION GAP SERPL CALC-SCNC: 11 MMOL/L — SIGNIFICANT CHANGE UP (ref 5–17)
BUN SERPL-MCNC: 16 MG/DL — SIGNIFICANT CHANGE UP (ref 7–23)
CALCIUM SERPL-MCNC: 9.4 MG/DL — SIGNIFICANT CHANGE UP (ref 8.4–10.5)
CHLORIDE SERPL-SCNC: 99 MMOL/L — SIGNIFICANT CHANGE UP (ref 96–108)
CO2 SERPL-SCNC: 23 MMOL/L — SIGNIFICANT CHANGE UP (ref 22–31)
CREAT SERPL-MCNC: 0.7 MG/DL — SIGNIFICANT CHANGE UP (ref 0.5–1.3)
CULTURE RESULTS: SIGNIFICANT CHANGE UP
CULTURE RESULTS: SIGNIFICANT CHANGE UP
GLUCOSE SERPL-MCNC: 80 MG/DL — SIGNIFICANT CHANGE UP (ref 70–99)
HCT VFR BLD CALC: 35.4 % — SIGNIFICANT CHANGE UP (ref 34.5–45)
HGB BLD-MCNC: 11.1 G/DL — LOW (ref 11.5–15.5)
MCHC RBC-ENTMCNC: 30.7 PG — SIGNIFICANT CHANGE UP (ref 27–34)
MCHC RBC-ENTMCNC: 31.4 GM/DL — LOW (ref 32–36)
MCV RBC AUTO: 97.8 FL — SIGNIFICANT CHANGE UP (ref 80–100)
NRBC # BLD: 0 /100 WBCS — SIGNIFICANT CHANGE UP (ref 0–0)
PLATELET # BLD AUTO: 230 K/UL — SIGNIFICANT CHANGE UP (ref 150–400)
POTASSIUM SERPL-MCNC: 4.6 MMOL/L — SIGNIFICANT CHANGE UP (ref 3.5–5.3)
POTASSIUM SERPL-SCNC: 4.6 MMOL/L — SIGNIFICANT CHANGE UP (ref 3.5–5.3)
RBC # BLD: 3.62 M/UL — LOW (ref 3.8–5.2)
RBC # FLD: 14.5 % — SIGNIFICANT CHANGE UP (ref 10.3–14.5)
SODIUM SERPL-SCNC: 133 MMOL/L — LOW (ref 135–145)
SPECIMEN SOURCE: SIGNIFICANT CHANGE UP
SPECIMEN SOURCE: SIGNIFICANT CHANGE UP
WBC # BLD: 6.55 K/UL — SIGNIFICANT CHANGE UP (ref 3.8–10.5)
WBC # FLD AUTO: 6.55 K/UL — SIGNIFICANT CHANGE UP (ref 3.8–10.5)

## 2021-08-07 PROCEDURE — 86850 RBC ANTIBODY SCREEN: CPT

## 2021-08-07 PROCEDURE — 80053 COMPREHEN METABOLIC PANEL: CPT

## 2021-08-07 PROCEDURE — U0003: CPT

## 2021-08-07 PROCEDURE — 85730 THROMBOPLASTIN TIME PARTIAL: CPT

## 2021-08-07 PROCEDURE — 86901 BLOOD TYPING SEROLOGIC RH(D): CPT

## 2021-08-07 PROCEDURE — 83735 ASSAY OF MAGNESIUM: CPT

## 2021-08-07 PROCEDURE — 86900 BLOOD TYPING SEROLOGIC ABO: CPT

## 2021-08-07 PROCEDURE — C1894: CPT

## 2021-08-07 PROCEDURE — 86769 SARS-COV-2 COVID-19 ANTIBODY: CPT

## 2021-08-07 PROCEDURE — 85610 PROTHROMBIN TIME: CPT

## 2021-08-07 PROCEDURE — 71045 X-RAY EXAM CHEST 1 VIEW: CPT

## 2021-08-07 PROCEDURE — 99285 EMERGENCY DEPT VISIT HI MDM: CPT

## 2021-08-07 PROCEDURE — 81001 URINALYSIS AUTO W/SCOPE: CPT

## 2021-08-07 PROCEDURE — 87040 BLOOD CULTURE FOR BACTERIA: CPT

## 2021-08-07 PROCEDURE — 74176 CT ABD & PELVIS W/O CONTRAST: CPT | Mod: MA

## 2021-08-07 PROCEDURE — C1769: CPT

## 2021-08-07 PROCEDURE — 50432 PLMT NEPHROSTOMY CATHETER: CPT

## 2021-08-07 PROCEDURE — 97162 PT EVAL MOD COMPLEX 30 MIN: CPT

## 2021-08-07 PROCEDURE — 87077 CULTURE AEROBIC IDENTIFY: CPT

## 2021-08-07 PROCEDURE — C1729: CPT

## 2021-08-07 PROCEDURE — 93005 ELECTROCARDIOGRAM TRACING: CPT

## 2021-08-07 PROCEDURE — 85025 COMPLETE CBC W/AUTO DIFF WBC: CPT

## 2021-08-07 PROCEDURE — 96375 TX/PRO/DX INJ NEW DRUG ADDON: CPT

## 2021-08-07 PROCEDURE — 85027 COMPLETE CBC AUTOMATED: CPT

## 2021-08-07 PROCEDURE — 87086 URINE CULTURE/COLONY COUNT: CPT

## 2021-08-07 PROCEDURE — 87186 SC STD MICRODIL/AGAR DIL: CPT

## 2021-08-07 PROCEDURE — 96365 THER/PROPH/DIAG IV INF INIT: CPT

## 2021-08-07 PROCEDURE — 96361 HYDRATE IV INFUSION ADD-ON: CPT

## 2021-08-07 PROCEDURE — 80048 BASIC METABOLIC PNL TOTAL CA: CPT

## 2021-08-07 PROCEDURE — 76770 US EXAM ABDO BACK WALL COMP: CPT

## 2021-08-07 RX ORDER — AZTREONAM 2 G
1 VIAL (EA) INJECTION
Qty: 14 | Refills: 0
Start: 2021-08-07 | End: 2021-08-13

## 2021-08-07 RX ORDER — FLUCONAZOLE 150 MG/1
150 TABLET ORAL ONCE
Refills: 0 | Status: COMPLETED | OUTPATIENT
Start: 2021-08-07 | End: 2021-08-07

## 2021-08-07 RX ORDER — ACETAMINOPHEN 500 MG
2 TABLET ORAL
Qty: 0 | Refills: 0 | DISCHARGE
Start: 2021-08-07

## 2021-08-07 RX ORDER — TAMSULOSIN HYDROCHLORIDE 0.4 MG/1
1 CAPSULE ORAL
Qty: 30 | Refills: 0
Start: 2021-08-07 | End: 2021-09-05

## 2021-08-07 RX ORDER — ASPIRIN/CALCIUM CARB/MAGNESIUM 324 MG
81 TABLET ORAL DAILY
Refills: 0 | Status: DISCONTINUED | OUTPATIENT
Start: 2021-08-07 | End: 2021-08-07

## 2021-08-07 RX ORDER — PRASUGREL 5 MG/1
5 TABLET, FILM COATED ORAL DAILY
Refills: 0 | Status: DISCONTINUED | OUTPATIENT
Start: 2021-08-07 | End: 2021-08-07

## 2021-08-07 RX ADMIN — Medication 100 MILLIGRAM(S): at 05:57

## 2021-08-07 RX ADMIN — RANOLAZINE 500 MILLIGRAM(S): 500 TABLET, FILM COATED, EXTENDED RELEASE ORAL at 05:57

## 2021-08-07 RX ADMIN — ERTAPENEM SODIUM 120 MILLIGRAM(S): 1 INJECTION, POWDER, LYOPHILIZED, FOR SOLUTION INTRAMUSCULAR; INTRAVENOUS at 09:15

## 2021-08-07 RX ADMIN — HEPARIN SODIUM 5000 UNIT(S): 5000 INJECTION INTRAVENOUS; SUBCUTANEOUS at 05:57

## 2021-08-07 RX ADMIN — FLUCONAZOLE 150 MILLIGRAM(S): 150 TABLET ORAL at 15:12

## 2021-08-07 RX ADMIN — FAMOTIDINE 40 MILLIGRAM(S): 10 INJECTION INTRAVENOUS at 06:00

## 2021-08-07 RX ADMIN — CHLORHEXIDINE GLUCONATE 1 APPLICATION(S): 213 SOLUTION TOPICAL at 14:25

## 2021-08-07 RX ADMIN — Medication 20 MILLIGRAM(S): at 05:58

## 2021-08-07 NOTE — PROGRESS NOTE ADULT - ASSESSMENT
"Encounter Date: 12/14/2018       History     Chief Complaint   Patient presents with    Arm Pain     pt reports waking up from her sleep with extreme RIGHT arm pain starting 1hr ago; pt denies any injuries or falls; pt states "I think it's out of place"; pt holding arm close to chest in bent position and crying     16-year-old female with a history of shoulder dislocations presenting with right shoulder pain. Patient states she woke up out of her sleep in with significant pain in her arm feeling like prior dislocations.  Did not patient denies trauma.  Denies numbness or weakness.  Had previously been her usual state of health.          Review of patient's allergies indicates:  No Known Allergies  No past medical history on file.  No past surgical history on file.  No family history on file.  Social History     Tobacco Use    Smoking status: Never Smoker    Smokeless tobacco: Never Used   Substance Use Topics    Alcohol use: No    Drug use: No     Review of Systems   Constitutional: Negative for fever.   HENT: Negative for sore throat.    Respiratory: Negative for shortness of breath.    Cardiovascular: Negative for chest pain.   Gastrointestinal: Negative for nausea.   Genitourinary: Negative for dysuria.   Musculoskeletal: Negative for back pain.   Skin: Negative for rash.   Neurological: Negative for weakness.   Hematological: Does not bruise/bleed easily.   Psychiatric/Behavioral: Negative for confusion.       Physical Exam     Initial Vitals [12/14/18 0327]   BP Pulse Resp Temp SpO2   (!) 147/69 93 20 98.7 °F (37.1 °C) 99 %      MAP       --         Physical Exam    Nursing note and vitals reviewed.  Constitutional: She appears well-developed and well-nourished. She is not diaphoretic. No distress.   HENT:   Head: Normocephalic and atraumatic.   Right Ear: External ear normal.   Left Ear: External ear normal.   Nose: Nose normal.   Eyes: EOM are normal. Pupils are equal, round, and reactive to light. No "
76 f with    Ureteral stone/ Hydronephrosis  - pain control  - NT pending   - Urology follow    UTI  - antibiotic. Ertapenem  - ID follow    CAD (coronary artery disease)   - continue Rx   - hold ASA, Effient for NT  - Cardiology follow dr. Briggs     Chronic bronchitis   - stable    HTN (hypertension)  - control    Morbid obesity with BMI of 40.0-44.9, adult  - Nutrition education     DVT prophylaxis    Amaury Dockery MD pager [phone] 
76 f with    Ureteral stone/ Hydronephrosis  - pain control  - NT pending   - Urology follow    UTI  - antibiotic. Ertapenem  - ID follow    CAD (coronary artery disease)   - continue Rx   - hold ASA, Effient for NT  - Cardiology follow dr. Briggs     Chronic bronchitis   - stable    HTN (hypertension)  - control    Morbid obesity with BMI of 40.0-44.9, adult  - Nutrition education     DVT prophylaxis    Amaury Dockery MD pager [phone] 
76 f with    Ureteral stone/ Hydronephrosis  - pain control  - s/p L NT   - Urology follow    UTI  - antibiotic. Ertapenem  - ID follow    R toenail pain/ Ingrown toenail   - Podiatry evaluation noted      CAD (coronary artery disease)   - continue Rx   - hold ASA, Effient for NT. Restart when cleared by IR.  - Cardiology follow dr. Briggs     Chronic bronchitis   - stable    HTN (hypertension)  - control    Morbid obesity with BMI of 40.0-44.9, adult  - Nutrition education     DVT prophylaxis    Amaury Dockery MD pager [phone] 
76 f with    Ureteral stone/ Hydronephrosis  - pain control  - s/p L NT   - Urology follow    UTI  - antibiotic. Ertapenem  - ID follow    R toenail pain/ Ingrown toenail resolved  - Podiatry evaluation noted      CAD (coronary artery disease)   - continue Rx   - restart ASA, Effient cleared by IR.  - Cardiology follow dr. Briggs     Chronic bronchitis   - stable    HTN (hypertension)  - control    Morbid obesity with BMI of 40.0-44.9, adult  - Nutrition education     DVT prophylaxis    DC home if arrangements in place for home care. Follow with PMD/ Urology in 3-4 days.     Amaury Dockery MD pager [phone] 
76F with h/o Nephrolithiasis s/p lithotripsy x3 s/p nephrostomy, CAD s/p CABGx4, HTN, Chronic bronchitis, emphysema, TYLOR, spinal stenosis, Cervical Ca s/p ROBERTO, s/p Appendectomy, s/p cholecystectomy, s/p left lumpectomy, p/w 4 hours of left flank pain. Pain began suddenly in left flank, feels achey and comes in waves, 10/10 in severity, does not radiate, is associated with nausea but no vomiting, and feels similar to previous kidney stones.   CT with partially obstructing erica  seen by urology  observation  denies dysuria but pyuria  no fevers chills or s/s dissemination  No recent abx    rec:  A) Nephrolithiasis  Pyuria  ? UTI  ? Reactive  follow urine cx  check blood Cx  continue ceftriaxone  Intervention/stone plan per urology    B) CABG, COPD  will defer to primary  pt not vaccinated for COVID  vaccine benefits/risks/adverse effects ,covid risk discussed ind etail  Vaccination encouraged  Pt will think about it    Will tailor plan for ID issues  per course,results.Will defer to primary team on management of other issues.  Assessment, plan and recommendations as detailed above were discussed with the medical/primary  team.  Will Follow.  Beeper [phone] [facility] [id number].   Wknd/afterhours/No response-[phone] or Fellow on call
76F with h/o Nephrolithiasis s/p lithotripsy x3 s/p nephrostomy, CAD s/p CABGx4, HTN, Chronic bronchitis, emphysema, TYLOR, spinal stenosis, Cervical Ca s/p ROBERTO, s/p Appendectomy, s/p cholecystectomy, s/p left lumpectomy, p/w 4 hours of left flank pain. Pain began suddenly in left flank, feels achey and comes in waves, 10/10 in severity, does not radiate, is associated with nausea but no vomiting, and feels similar to previous kidney stones.   CT with partially obstructing erica  seen by urology  observation  denies dysuria but pyuria  no fevers chills or s/s dissemination  No recent abx but isolate ESBL     rec:  A) Nephrolithiasis  Pyuria   UTI  continue  IV invanz  Intervention/stone plan per urology-plans for PCN and extraction noted  if stable after intervention can consider bactrim po   will need 7-10 day course     B) CABG, COPD  will defer to primary  pt not vaccinated for COVID  Vaccination again encouraged      Will tailor plan for ID issues  per course,results.Will defer to primary team on management of other issues.  Assessment, plan and recommendations as detailed above were discussed with the medical/primary  team.  Will Follow.  Beeper [id number] [facility]EL[facility] [id number].   Wknd/afterhours/No response-[phone] or Fellow on call  
76F with h/o Nephrolithiasis s/p lithotripsy x3 s/p nephrostomy, CAD s/p CABGx4, HTN, Chronic bronchitis, emphysema, TYLOR, spinal stenosis, Cervical Ca s/p ROBERTO, s/p Appendectomy, s/p cholecystectomy, s/p left lumpectomy, p/w 4 hours of left flank pain. Pain began suddenly in left flank, feels achey and comes in waves, 10/10 in severity, does not radiate, is associated with nausea but no vomiting, and feels similar to previous kidney stones.   CT with partially obstructing erica  seen by urology  observation  denies dysuria but pyuria  no fevers chills or s/s dissemination  No recent abx but isolate ESBL     rec:  A) Nephrolithiasis  Pyuria   UTI  will change coverage to IV invanz  Intervention/stone plan per urology-plans for PCN and extraction noted  if stable after intervention can consider bactrim po   will need 7-10 day course     B) CABG, COPD  will defer to primary  pt not vaccinated for COVID  vaccine benefits/risks/adverse effects ,covid risk discussed ind etail  Vaccination again encouraged  Pt will think about it    Will tailor plan for ID issues  per course,results.Will defer to primary team on management of other issues.  Assessment, plan and recommendations as detailed above were discussed with the medical/primary  team.  Will Follow.  Beeper [phone] [facility] [id number].   Wknd/afterhours/No response-[phone] or Fellow on call  
76F with h/o Nephrolithiasis s/p lithotripsy x3 s/p nephrostomy, CAD s/p CABGx4, HTN, Chronic bronchitis, emphysema, TYLOR, spinal stenosis, Cervical Ca s/p ROBERTO, s/p Appendectomy, s/p cholecystectomy, s/p left lumpectomy, p/w 4 hours of left flank pain. Pain began suddenly in left flank, feels achey and comes in waves, 10/10 in severity, does not radiate, is associated with nausea but no vomiting, and feels similar to previous kidney stones.   CT with partially obstructing erica  seen by urology  observation  denies dysuria but pyuria  no fevers chills or s/s dissemination  No recent abx but isolate ESBL though S to bactrim    rec:  A) Nephrolithiasis  Pyuria   UTI  s/p PCN  has been on Invanz already 4 days  Can change to po bactrim on DC   continue X 6 more days  will recommend brett-operative prophylaxis around stone extraction     B) CABG, COPD  will defer to primary  pt not vaccinated for COVID  Vaccination again encouraged      Will tailor plan for ID issues  per course,results.Will defer to primary team on management of other issues.  Assessment, plan and recommendations as detailed above were discussed with the medical/primary  team.  ID will follow as needed,please call [phone] if any questions or issues.  
76F with renal stone, hydronephrosis  -Planned for IR placement of nephrostomy tube for renal decompression later today  -Monitor urine output Cr electrolytes  -Hydration  -F/u cultures  -Abx per ID  -Definitive stone management as outpatient  
A/p   76F with h/o Nephrolithiasis s/p lithotripsy x3 s/p nephrostomy, CAD s/p CABGx4, HTN, Chronic bronchitis, emphysema, TYLOR, spinal stenosis, Cervical Ca s/p ROBERTO, s/p Appendectomy, s/p cholecystectomy, s/p left lumpectomy, p/w 4 hours of left flank pain.     #UTI, renal stones, await NT  +UCx noted   -iv abx per ID   -Planned for IR placement of nephrostomy tube today   -asa and Effient on hold   -resume at least ASA as soon as feasible for hx of CABG, recent PCI  -EKG without acute ischemic changes - pt remains cardiac optimized to proceed with acceptable cardiac risk   -urology f/u     #CAD, CABG, s/p PCI to diag 3/2021  -cv stable, no chest pain  -cont ranexa, lipitor   -asa/effient on hold  -resume ASA as soon as feasible     #LE Edema  -stable  -cont po lasix     dvt ppx         
A/p   76F with h/o Nephrolithiasis s/p lithotripsy x3 s/p nephrostomy, CAD s/p CABGx4, HTN, Chronic bronchitis, emphysema, TYLOR, spinal stenosis, Cervical Ca s/p ROBERTO, s/p Appendectomy, s/p cholecystectomy, s/p left lumpectomy, p/w 4 hours of left flank pain.     #UTI, renal stones, await NT  +UCx noted   -iv abx per ID   -Planned for IR placement of nephrostomy tube tomorrow   -asa and Effient on hold   -resume at least ASA as soon as feasible for hx of CABG, recent PCI  -EKG without acute ischemic changes - pt remains cardiac optimized to proceed with acceptable cardiac risk   -urology f/u     #CAD, CABG, s/p PCI to diag 3/2021  -cv stable, no chest pain  -cont ranexa, lipitor   -asa/effient on hold  -resume ASA as soon as feasible     dvt ppx         
A/p   76F with h/o Nephrolithiasis s/p lithotripsy x3 s/p nephrostomy, CAD s/p CABGx4, HTN, Chronic bronchitis, emphysema, TYLOR, spinal stenosis, Cervical Ca s/p ROBERTO, s/p Appendectomy, s/p cholecystectomy, s/p left lumpectomy, p/w 4 hours of left flank pain.     #UTI, renal stones, s/p NT  +UCx noted   -iv abx per ID   -s/p nephrostomy tube - cv remains stable   -asa and Effient on hold   -resume at least ASA as soon as feasible for hx of CABG, recent PCI  -urology f/u     #CAD, CABG, s/p PCI to diag 3/2021  -cv stable, no chest pain  -cont ranexa, lipitor   -asa/effient on hold  -resume ASA as soon as feasible     #LE Edema  -stable  -cont po lasix     dvt ppx       plan discussed with ACP   
Assessment/Plan:  76y Female admitted with left obstructive uropathy 2/2 ureteral stone s/p left PCN 8/5/21 with Dr. Kirkland.    -Keep drain to gravity bag  -Monitor drain output  -trend vs/labs  -flush drain with 5cc NS daily forward only; DO NOT aspirate  -change dressing q3 days or when dressing is saturated  -Definitive stone management as per urology  -regarding outpatient follow up with IR, if the patient is d/c home with drainage catheter they can make an appointment with IR by calling the IR booking office at [phone]; recommend IR follow in 3 months for tube evaluation and routine exchange.  -they will benefit from VNS service to help with drainage catheter care; they should continue same drainage catheter care as an outpatient.     Please call IR at extension [phone] with any questions, concerns, or issues regarding above.  
F with renal stone, hydronephrosis    -Planned for IR placement of nephrostomy tube for renal decompression  -PCN tube placement pending IR schdule  -If pt becomes hemodynamically unstable or signs of sepsis, page urology immediately to discuss options for immediate renal decompression  -Monitor urine output Cr electrolytes  -Hydration  -F/u cultures  
76F with h/o Nephrolithiasis s/p lithotripsy x3 s/p nephrostomy, CAD s/p CABGx4, HTN, Chronic bronchitis, emphysema, TYLOR, spinal stenosis, Cervical Ca s/p ROBERTO, s/p Appendectomy, s/p cholecystectomy, s/p left lumpectomy, p/w 4 hours of left flank pain. Pain began suddenly in left flank, feels achey and comes in waves, 10/10 in severity, does not radiate, is associated with nausea but no vomiting, and feels similar to previous kidney stones.   CT with partially obstructing erica  seen by urology  observation  denies dysuria but pyuria  no fevers chills or s/s dissemination  No recent abx    rec:  A) Nephrolithiasis  Pyuria  ? UTI  ? Reactive  follow urine cx  check blood Cx  continue ceftriaxone  Intervention/stone plan per urology-plans for PCN and extraction noted    B) CABG, COPD  will defer to primary  pt not vaccinated for COVID  vaccine benefits/risks/adverse effects ,covid risk discussed ind etail  Vaccination again encouraged  Pt will think about it    Will tailor plan for ID issues  per course,results.Will defer to primary team on management of other issues.  Assessment, plan and recommendations as detailed above were discussed with the medical/primary  team.  Will Follow.  Beeper [phone] [facility] [id number].   Wknd/afterhours/No response-[id number] or Fellow on call
76F with h/o Nephrolithiasis s/p lithotripsy x3 s/p nephrostomy, CAD s/p CABGx4, HTN, Chronic bronchitis, emphysema, TYLOR, spinal stenosis, Cervical Ca s/p ROBERTO, s/p Appendectomy, s/p cholecystectomy, s/p left lumpectomy, p/w 4 hours of left flank pain. Pain began suddenly in left flank, feels achey and comes in waves, 10/10 in severity, does not radiate, is associated with nausea but no vomiting, and feels similar to previous kidney stones.   CT with partially obstructing erica  seen by urology  observation  denies dysuria but pyuria  no fevers chills or s/s dissemination  No recent abx but isolate ESBL     rec:  A) Nephrolithiasis  Pyuria   UTI  continue  IV invanz  Intervention/stone plan per urology-plans for PCN and extraction noted  if stable after intervention can consider bactrim po   will need 7-10 day course     B) CABG, COPD  will defer to primary  pt not vaccinated for COVID  Vaccination again encouraged      Will tailor plan for ID issues  per course,results.Will defer to primary team on management of other issues.  Assessment, plan and recommendations as detailed above were discussed with the medical/primary  team.  Will Follow.  Beeper [phone] [facility] [id number].   Wknd/afterhours/No response-[phone] or Fellow on call  
76F with renal stone, hydronephrosis  -Planned for IR placement of nephrostomy tube for renal decompression on thursday  -If pt becomes hemodynamically unstable or signs of sepsis, would need PCN placed emergently  -Monitor urine output Cr electrolytes  -Hydration  -F/u cultures  -Abx per ID  -Definitive stone management at later date  
76F with renal stone, hydronephrosis s/p PCN palcement  -tube in tact and draining well  -Abx plan per ID  -Definitive stone management as outpatient. No  contraindication to hospital discharge  
A/p   76F with h/o Nephrolithiasis s/p lithotripsy x3 s/p nephrostomy, CAD s/p CABGx4, HTN, Chronic bronchitis, emphysema, TYLOR, spinal stenosis, Cervical Ca s/p ROBERTO, s/p Appendectomy, s/p cholecystectomy, s/p left lumpectomy, p/w 4 hours of left flank pain.     #UTI, renal stones, await NT  +UCx noted   -iv abx per ID   -Planned for IR placement of nephrostomy tube  -asa and Effient on hold   -resume at least ASA as soon as feasible for hx of CABG, recent PCI  -EKG without acute ischemic changes - pt remains cardiac optimized to proceed with acceptable cardiac risk   -urology f/u     #CAD, CABG, s/p PCI to diag 3/2021  -cv stable, no chest pain  -asa/effient on hold  -resume ASA as soon as feasible     dvt ppx         
scleral icterus.   Neck: Normal range of motion. Neck supple.   Cardiovascular: Normal rate, regular rhythm, normal heart sounds and intact distal pulses. Exam reveals no gallop and no friction rub.    No murmur heard.  Pulmonary/Chest: Breath sounds normal. No stridor. No respiratory distress. She has no wheezes. She has no rhonchi. She has no rales. She exhibits no tenderness.   Abdominal: Soft. Normal appearance and bowel sounds are normal. She exhibits no distension. There is no tenderness. There is no rebound and no guarding.   Musculoskeletal: Normal range of motion. She exhibits tenderness. She exhibits no edema.   Willing right shoulder down.  Body habitus makes deformity difficult to assess.  2+ radial regular pulses. Normal sensation and distal  strength   Neurological: She is alert and oriented to person, place, and time. No cranial nerve deficit. GCS score is 15. GCS eye subscore is 4. GCS verbal subscore is 5. GCS motor subscore is 6.   Skin: Skin is warm and dry. No rash noted.   Psychiatric: She has a normal mood and affect. Her behavior is normal.         ED Course   Procedures  Labs Reviewed   POCT URINE PREGNANCY          Imaging Results    None          Medical Decision Making:   Initial Assessment:   16-year-old female with history of shoulder dislocations presenting with pain similar to prior.  Neurovascularly intact.  Suspect recurrent dislocation vs shoulder strain.  We will get a x-ray and reassess.    ED Management:  Xray without evidence of dislocation or fracture. No Hx of trauma. ROM limited. Possible minor discloation? Attempted manipulation after anxiolysis and pain control. ROM not significantly limited. Doubt true dislocation. Given sling for comfort. Discussed in detail need for ortho followup in next few days.                      Clinical Impression:   Diagnoses of Arm pain and Right shoulder pain were pertinent to this visit.      Disposition:   Disposition: 
76 f with    Ureteral stone/ Hydronephrosis  - pain control  - NT pending   - Urology follow    UTI  - antibiotic  - ID evaluation    CAD (coronary artery disease)   - continue Rx   - hold ASA, Effient for NT  - Cardiology evaluation dr. Briggs     Chronic bronchitis   - stable    HTN (hypertension)  - control    Morbid obesity with BMI of 40.0-44.9, adult  - Nutrition education     DVT prophylaxis    Amaury Dockery MD pager [phone] 
76 f with    Ureteral stone/ Hydronephrosis  - pain control  - NT pending   - Urology follow    UTI  - antibiotic  - ID follow    CAD (coronary artery disease)   - continue Rx   - hold ASA, Effient for NT  - Cardiology follow dr. Briggs     Chronic bronchitis   - stable    HTN (hypertension)  - control    Morbid obesity with BMI of 40.0-44.9, adult  - Nutrition education     DVT prophylaxis    Amaury Dockery MD pager [phone] 
76 f with    Ureteral stone/ Hydronephrosis  - pain control  - NT pending   - Urology follow    UTI  - antibiotic. Ertapenem  - ID follow    R toenail pain  - P{odiatry evaluation pending     CAD (coronary artery disease)   - continue Rx   - hold ASA, Effient for NT  - Cardiology follow dr. Briggs     Chronic bronchitis   - stable    HTN (hypertension)  - control    Morbid obesity with BMI of 40.0-44.9, adult  - Nutrition education     DVT prophylaxis    Amaury Dockery MD pager [phone] 
76F with h/o Nephrolithiasis s/p lithotripsy x3 s/p nephrostomy, CAD s/p CABGx4, HTN, Chronic bronchitis, emphysema, TYLOR, spinal stenosis, Cervical Ca s/p ROBERTO, s/p Appendectomy, s/p cholecystectomy, s/p left lumpectomy, p/w 4 hours of left flank pain.     #UTI, renal stones, await NT  -abx per med/urology  -Planned for IR placement of nephrostomy tube  -asa and effient on hold   -resume at least ASA as soon as feasible for hx of CABG, recent PCI  -check EKG  -assuming unremarkable ECG, remains cardiac optimized to proceed with acceptable cardiac risk     #CAD, CABG, s/p PCI to diag 3/2021  -cv stable, no chest pain  -asa/effient on hold  -resume ASA as soon as feasible     dvt ppx         
A/p   76F with h/o Nephrolithiasis s/p lithotripsy x3 s/p nephrostomy, CAD s/p CABGx4, HTN, Chronic bronchitis, emphysema, TYLOR, spinal stenosis, Cervical Ca s/p ROBERTO, s/p Appendectomy, s/p cholecystectomy, s/p left lumpectomy, p/w 4 hours of left flank pain.     #UTI, renal stones, s/p NT  +UCx noted   -iv abx per ID   -s/p nephrostomy tube - cv remains stable   -asa and Effient on hold   -resume at least ASA as soon as feasible for hx of CABG, recent PCI  -urology f/u     #CAD, CABG, s/p PCI to diag 3/2021  -cv stable, no chest pain  -cont ranexa, lipitor   -asa/effient on hold  -resume ASA as soon as feasible     #LE Edema  -stable  -cont po lasix     dvt ppx       plan discussed with ACP   
Discharged  Condition: Stable                        Jamin Vincent MD  12/17/18 3113    
patient with large left renal and small ureteral calculi now assx  although she has low grade fever at night she is non toxic and has no signs or symptoms suggestive of sirs, urosepis    we discussed rx options including no rx,  stent insertion with staged therapy and left nt in preparation for pcnl and ureteroscospy laser litho  she wishes to proceed with nephrostomy tube insertion but will need to hold effient and asa unless septic at which point emergent therapy will be necessary    plan left nt  tuesdey unless sepsis  hold effient and asa  regular diet   strain urine

## 2021-08-07 NOTE — PROGRESS NOTE ADULT - NSICDXPILOT_GEN_ALL_CORE
[place]

## 2021-08-07 NOTE — PROVIDER CONTACT NOTE (CHANGE IN STATUS NOTIFICATION) - BACKGROUND
Bladder scan residual 999cc
Pt. here with UTI on abt ceftriaxone iv q 12 hrs. ureteral stone hydronephrosis.
pt adm for nephrolithiasis  s/p perc nephrosotomy placement on 8/5  on iv abx

## 2021-08-07 NOTE — PROVIDER CONTACT NOTE (CHANGE IN STATUS NOTIFICATION) - ASSESSMENT
VS b/p 153/73, HR 84, temp 100.6, ox sat 94%ra.
pt c/o burin/itching vaginal area  upon assessment, found vaginal area to have erythema & white discharge

## 2021-08-07 NOTE — PROVIDER CONTACT NOTE (CHANGE IN STATUS NOTIFICATION) - SITUATION
pt c/o pruritic vaginal area  pt states "I think I have a yeast infection"
Pt. c/o not being able to void problem started early this morning. Pt. just reported it to nurse at 0645

## 2021-08-07 NOTE — PROGRESS NOTE ADULT - SUBJECTIVE AND OBJECTIVE BOX
CARDIOLOGY FOLLOW UP - Dr. Briggs  Date of Service: 8/2/21  CC: denies cp, sob, and palpitations     Review of Systems:  Constitutional: No fever, weight loss, or fatigue  Respiratory: No cough, wheezing, or hemoptysis, no shortness of breath  Cardiovascular: No chest pain, palpitations, passing out, dizziness, or leg swelling  Gastrointestinal: No abd or epigastric pain.  No nausea, vomiting, or hematemesis; no diarrhea or constipation, no melena or hematochezia  Vascular: no edema       PHYSICAL EXAM:  T(C): 36.5 (08-02-21 @ 08:08), Max: 37.3 (08-01-21 @ 16:00)  HR: 68 (08-02-21 @ 08:08) (68 - 86)  BP: 138/74 (08-02-21 @ 08:08) (138/74 - 168/71)  RR: 18 (08-02-21 @ 08:08) (18 - 18)  SpO2: 93% (08-02-21 @ 08:08) (93% - 95%)  Wt(kg): --  I&O's Summary    01 Aug 2021 07:01  -  02 Aug 2021 07:00  --------------------------------------------------------  IN: 770 mL / OUT: 1650 mL / NET: -880 mL    02 Aug 2021 07:01  -  02 Aug 2021 14:47  --------------------------------------------------------  IN: 450 mL / OUT: 400 mL / NET: 50 mL        Appearance: Normal	  Cardiovascular: Normal S1 S2,RRR, No JVD, No murmurs  Respiratory: Lungs clear to auscultation	  Gastrointestinal:  Soft, Non-tender, + BS	  Extremities: Normal range of motion, No clubbing, cyanosis or edema      Home Medications:  aspirin 81 mg oral tablet: orally once a day (31 Jul 2021 11:32)  famotidine 40 mg oral tablet: 1 tab(s) orally 2 times a day (31 Jul 2021 11:32)  furosemide 20 mg oral tablet: 1 tab(s) orally once a day (31 Jul 2021 11:32)  Metoprolol Succinate  mg oral tablet, extended release: 1 tab(s) orally once a day (at bedtime) (31 Jul 2021 11:32)  prasugrel 5 mg oral tablet: 1 tab(s) orally once a day (31 Jul 2021 11:32)  ranolazine 500 mg oral tablet, extended release: 1 tab(s) orally 2 times a day (31 Jul 2021 11:32)  rosuvastatin 20 mg oral tablet: 1 tab(s) orally once a day (at bedtime) (31 Jul 2021 11:32)      MEDICATIONS  (STANDING):  atorvastatin 80 milliGRAM(s) Oral at bedtime  cefTRIAXone   IVPB 1000 milliGRAM(s) IV Intermittent every 12 hours  chlorhexidine 2% Cloths 1 Application(s) Topical daily  famotidine    Tablet 40 milliGRAM(s) Oral two times a day  furosemide    Tablet 20 milliGRAM(s) Oral daily  heparin   Injectable 5000 Unit(s) SubCutaneous every 12 hours  metoprolol succinate  milliGRAM(s) Oral daily  ranolazine 500 milliGRAM(s) Oral two times a day  sodium chloride 0.9%. 1000 milliLiter(s) (75 mL/Hr) IV Continuous <Continuous>  tamsulosin 0.4 milliGRAM(s) Oral at bedtime      TELEMETRY: 	    ECG:  	  RADIOLOGY:   DIAGNOSTIC TESTING:  [ ] Echocardiogram:  [ ]  Catheterization:  [ ] Stress Test:    OTHER: 	    LABS:	 	                            10.7   6.29  )-----------( 226      ( 02 Aug 2021 08:50 )             32.9     08-02    137  |  101  |  12  ----------------------------<  111<H>  3.8   |  28  |  0.69    Ca    8.8      02 Aug 2021 08:50              
Patient is a 76y old  Female who presents with a chief complaint of renal colic (01 Aug 2021 10:02)      SUBJECTIVE / OVERNIGHT EVENTS: Comfortable without new complaints.   Review of Systems  chest pain no  palpitations no  sob no  nausea no  headache no    MEDICATIONS  (STANDING):  atorvastatin 80 milliGRAM(s) Oral at bedtime  cefTRIAXone   IVPB 1000 milliGRAM(s) IV Intermittent every 12 hours  chlorhexidine 2% Cloths 1 Application(s) Topical daily  famotidine    Tablet 40 milliGRAM(s) Oral two times a day  furosemide    Tablet 20 milliGRAM(s) Oral daily  heparin   Injectable 5000 Unit(s) SubCutaneous every 12 hours  metoprolol succinate  milliGRAM(s) Oral daily  ranolazine 500 milliGRAM(s) Oral two times a day  sodium chloride 0.9%. 1000 milliLiter(s) (75 mL/Hr) IV Continuous <Continuous>  tamsulosin 0.4 milliGRAM(s) Oral at bedtime    MEDICATIONS  (PRN):  acetaminophen   Tablet .. 650 milliGRAM(s) Oral every 6 hours PRN Temp greater or equal to 38C (100.4F), Mild Pain (1 - 3)      Vital Signs Last 24 Hrs  T(C): 36.3 (02 Aug 2021 16:05), Max: 36.9 (02 Aug 2021 00:03)  T(F): 97.4 (02 Aug 2021 16:05), Max: 98.5 (02 Aug 2021 05:23)  HR: 71 (02 Aug 2021 16:05) (68 - 86)  BP: 141/80 (02 Aug 2021 16:05) (138/74 - 168/71)  BP(mean): --  RR: 18 (02 Aug 2021 16:05) (18 - 18)  SpO2: 94% (02 Aug 2021 16:05) (93% - 95%)    PHYSICAL EXAM:  GENERAL: NAD, well-developed  HEAD:  Atraumatic, Normocephalic  EYES: EOMI, PERRLA, conjunctiva and sclera clear  NECK: Supple, No JVD  CHEST/LUNG: Clear to auscultation bilaterally; No wheeze  HEART: Regular rate and rhythm; No murmurs, rubs, or gallops  ABDOMEN: Soft, Nontender, Nondistended; Bowel sounds present  EXTREMITIES:  2+ Peripheral Pulses, No clubbing, cyanosis, or edema  PSYCH: AAOx3  NEUROLOGY: non-focal  SKIN: No rashes or lesions    LABS:                        10.7   6.29  )-----------( 226      ( 02 Aug 2021 08:50 )             32.9     08-02    137  |  101  |  12  ----------------------------<  111<H>  3.8   |  28  |  0.69    Ca    8.8      02 Aug 2021 08:50                Culture - Blood (collected 01 Aug 2021 05:49)  Source: .Blood Blood-Peripheral  Preliminary Report (02 Aug 2021 06:01):    No growth to date.    Culture - Blood (collected 01 Aug 2021 05:49)  Source: .Blood Blood-Peripheral  Preliminary Report (02 Aug 2021 06:01):    No growth to date.    Culture - Urine (collected 31 Jul 2021 03:38)  Source: Clean Catch Clean Catch (Midstream)  Preliminary Report (01 Aug 2021 19:06):    >100,000 CFU/ml Klebsiella pneumoniae        RADIOLOGY & ADDITIONAL TESTS:    Imaging Personally Reviewed:    Consultant(s) Notes Reviewed:      Care Discussed with Consultants/Other Providers:  
Patient is a 76y old  Female who presents with a chief complaint of renal colic (01 Aug 2021 10:02)      SUBJECTIVE / OVERNIGHT EVENTS: c/o R toenail pain  Review of Systems  chest pain no  palpitations no  sob no  nausea no  headache no    MEDICATIONS  (STANDING):  atorvastatin 80 milliGRAM(s) Oral at bedtime  chlorhexidine 2% Cloths 1 Application(s) Topical daily  ertapenem  IVPB      ertapenem  IVPB 1000 milliGRAM(s) IV Intermittent every 24 hours  famotidine    Tablet 40 milliGRAM(s) Oral two times a day  furosemide    Tablet 20 milliGRAM(s) Oral daily  heparin   Injectable 5000 Unit(s) SubCutaneous every 12 hours  melatonin 3 milliGRAM(s) Oral at bedtime  metoprolol succinate  milliGRAM(s) Oral daily  ranolazine 500 milliGRAM(s) Oral two times a day  sodium chloride 0.9%. 1000 milliLiter(s) (75 mL/Hr) IV Continuous <Continuous>  tamsulosin 0.4 milliGRAM(s) Oral at bedtime    MEDICATIONS  (PRN):  acetaminophen   Tablet .. 650 milliGRAM(s) Oral every 6 hours PRN Temp greater or equal to 38C (100.4F), Mild Pain (1 - 3)  ondansetron Injectable 4 milliGRAM(s) IV Push once PRN Nausea and/or Vomiting      Vital Signs Last 24 Hrs  T(C): 36.9 (05 Aug 2021 15:03), Max: 36.9 (05 Aug 2021 14:30)  T(F): 98.4 (05 Aug 2021 14:30), Max: 98.4 (05 Aug 2021 14:30)  HR: 62 (05 Aug 2021 15:30) (62 - 95)  BP: 117/54 (05 Aug 2021 15:30) (98/60 - 167/83)  BP(mean): 72 (05 Aug 2021 15:30) (70 - 72)  RR: 15 (05 Aug 2021 15:30) (15 - 20)  SpO2: 98% (05 Aug 2021 15:30) (95% - 100%)    PHYSICAL EXAM:  GENERAL: NAD, well-developed   HEAD:  Atraumatic, Normocephalic  EYES: EOMI, PERRLA, conjunctiva and sclera clear  NECK: Supple, No JVD  CHEST/LUNG: Clear to auscultation bilaterally; No wheeze  HEART: Regular rate and rhythm; No murmurs, rubs, or gallops  ABDOMEN: Soft, Nontender, Nondistended; Bowel sounds present  EXTREMITIES:  2+ Peripheral Pulses, No clubbing, cyanosis, or edema  PSYCH: AAOx3  NEUROLOGY: non-focal  SKIN: No rashes or lesions    LABS:                        11.4   5.48  )-----------( 332      ( 05 Aug 2021 07:08 )             35.3     08-05    138  |  100  |  21  ----------------------------<  95  4.1   |  25  |  0.70    Ca    9.6      05 Aug 2021 07:07  Mg     2.0     08-05    TPro  6.8  /  Alb  3.2<L>  /  TBili  0.2  /  DBili  x   /  AST  13  /  ALT  11  /  AlkPhos  83  08-05    PT/INR - ( 05 Aug 2021 07:09 )   PT: 12.4 sec;   INR: 1.04 ratio         PTT - ( 05 Aug 2021 07:09 )  PTT:29.9 sec            RADIOLOGY & ADDITIONAL TESTS:    Imaging Personally Reviewed:    Consultant(s) Notes Reviewed:      Care Discussed with Consultants/Other Providers:  
Patient is a 76y old  Female who presents with a chief complaint of renal colic (01 Aug 2021 10:02)    Being followed by ID for UTI-ESBL    Interval history:feels well  for pCN   No acute events      ROS:  No cough,SOB,CP  No N/V/D./abd pain  No other complaints      Antimicrobials:    ertapenem  IVPB      ertapenem  IVPB 1000 milliGRAM(s) IV Intermittent every 24 hours    Other medications reviewed    Vital Signs Last 24 Hrs  T(C): 36.8 (08-05-21 @ 12:39), Max: 36.8 (08-05-21 @ 12:39)  T(F): 98.3 (08-05-21 @ 12:39), Max: 98.3 (08-05-21 @ 12:39)  HR: 80 (08-05-21 @ 12:39) (74 - 95)  BP: 160/88 (08-05-21 @ 12:39) (139/75 - 167/83)  BP(mean): --  RR: 20 (08-05-21 @ 12:39) (18 - 20)  SpO2: 97% (08-05-21 @ 12:39) (95% - 97%)    Physical Exam:    HEENT PERRLA EOMI    No oral exudate or erythema    Chest Good AE,CTA    CVS RRR S1 S2 WNl No murmur or rub or gallop    Abd soft BS normal obese  No tenderness no masses    IV site no erythema tenderness or discharge    CNS AAO X 3 no focal    Lab Data:                          11.4   5.48  )-----------( 332      ( 05 Aug 2021 07:08 )             35.3       08-05    138  |  100  |  21  ----------------------------<  95  4.1   |  25  |  0.70    Ca    9.6      05 Aug 2021 07:07  Mg     2.0     08-05    TPro  6.8  /  Alb  3.2<L>  /  TBili  0.2  /  DBili  x   /  AST  13  /  ALT  11  /  AlkPhos  83  08-05        Culture - Blood (collected 01 Aug 2021 05:49)  Source: .Blood Blood-Peripheral  Preliminary Report (02 Aug 2021 06:01):    No growth to date.    Culture - Blood (collected 01 Aug 2021 05:49)  Source: .Blood Blood-Peripheral  Preliminary Report (02 Aug 2021 06:01):    No growth to date.        < from: Xray Chest 1 View- PORTABLE-Urgent (Xray Chest 1 View- PORTABLE-Urgent .) (07.31.21 @ 01:08) >  IMPRESSION:  Clear lungs.    < end of copied text >                
Urology Progress Note    Overnight Events:  No acute urologic events overnight. Eating breakfast. No fevers    Review of Systems:   Constitutional: No weight loss, no weakness  CV: No chest pain, no chest pressure  Pulm: No shortness of breath, cough, or sputum    Physical Exam:  Vital signs  T(C): 36.5 (08-02-21 @ 08:08), Max: 37.3 (08-01-21 @ 16:00)  HR: 68 (08-02-21 @ 08:08)  BP: 138/74 (08-02-21 @ 08:08)  SpO2: 93% (08-02-21 @ 08:08)  Wt(kg): --    Gen: No acute distress. Normal mood  Abd: soft, non-tender, non-distended  : Non-palpable bladder    Labs:      08-03 @ 06:48    WBC 5.95  / Hct 35.1  / SCr 0.67     08-02 @ 08:50    WBC 6.29  / Hct 32.9  / SCr 0.69             
Urology Progress Note    Overnight Events:  No acute urologic events overnight. Had PNC placed, currently draining clear yellow urine    Review of Systems:   Constitutional: No weight loss, no weakness  CV: No chest pain, no chest pressure  Pulm: No shortness of breath, cough, or sputum    Physical Exam:  Vital signs  T(C): 36.5 (08-02-21 @ 08:08), Max: 37.3 (08-01-21 @ 16:00)  HR: 68 (08-02-21 @ 08:08)  BP: 138/74 (08-02-21 @ 08:08)  SpO2: 93% (08-02-21 @ 08:08)  Wt(kg): --    Gen: No acute distress. Normal mood  Abd: soft, non-tender, non-distended  : Non-palpable bladder    Labs:        08-05 @ 07:08    WBC 5.48  / Hct 35.3  / SCr --       08-05 @ 07:07    WBC --    / Hct --    / SCr 0.70               
CARDIOLOGY FOLLOW UP - Dr. Briggs  Date of Service: 8/5/21  CC: denies cp, sob, and palpitations     Review of Systems:  Constitutional: No fever, weight loss, or fatigue  Respiratory: No cough, wheezing, or hemoptysis, no shortness of breath  Cardiovascular: No chest pain, palpitations, passing out, dizziness, or leg swelling  Gastrointestinal: No abd or epigastric pain.  No nausea, vomiting, or hematemesis; no diarrhea or constipation, no melena or hematochezia  Vascular: no edema       PHYSICAL EXAM:  T(C): 36.8 (08-05-21 @ 12:39), Max: 36.8 (08-05-21 @ 12:39)  HR: 80 (08-05-21 @ 12:39) (74 - 95)  BP: 160/88 (08-05-21 @ 12:39) (139/75 - 167/83)  RR: 20 (08-05-21 @ 12:39) (18 - 20)  SpO2: 97% (08-05-21 @ 12:39) (95% - 97%)  Wt(kg): --  I&O's Summary    04 Aug 2021 07:01  -  05 Aug 2021 07:00  --------------------------------------------------------  IN: 0 mL / OUT: 2600 mL / NET: -2600 mL    05 Aug 2021 07:01  -  05 Aug 2021 13:00  --------------------------------------------------------  IN: 1000 mL / OUT: 0 mL / NET: 1000 mL        Appearance: Normal	  Cardiovascular: Normal S1 S2,RRR, No JVD, No murmurs  Respiratory: Lungs clear to auscultation	  Gastrointestinal:  Soft, Non-tender, + BS	  Extremities: Normal range of motion, No clubbing, cyanosis or edema      Home Medications:  aspirin 81 mg oral tablet: orally once a day (31 Jul 2021 11:32)  famotidine 40 mg oral tablet: 1 tab(s) orally 2 times a day (31 Jul 2021 11:32)  furosemide 20 mg oral tablet: 1 tab(s) orally once a day (31 Jul 2021 11:32)  Metoprolol Succinate  mg oral tablet, extended release: 1 tab(s) orally once a day (at bedtime) (31 Jul 2021 11:32)  prasugrel 5 mg oral tablet: 1 tab(s) orally once a day (31 Jul 2021 11:32)  ranolazine 500 mg oral tablet, extended release: 1 tab(s) orally 2 times a day (31 Jul 2021 11:32)  rosuvastatin 20 mg oral tablet: 1 tab(s) orally once a day (at bedtime) (31 Jul 2021 11:32)      MEDICATIONS  (STANDING):  atorvastatin 80 milliGRAM(s) Oral at bedtime  chlorhexidine 2% Cloths 1 Application(s) Topical daily  ertapenem  IVPB      ertapenem  IVPB 1000 milliGRAM(s) IV Intermittent every 24 hours  famotidine    Tablet 40 milliGRAM(s) Oral two times a day  furosemide    Tablet 20 milliGRAM(s) Oral daily  heparin   Injectable 5000 Unit(s) SubCutaneous every 12 hours  melatonin 3 milliGRAM(s) Oral at bedtime  metoprolol succinate  milliGRAM(s) Oral daily  ranolazine 500 milliGRAM(s) Oral two times a day  sodium chloride 0.9%. 1000 milliLiter(s) (75 mL/Hr) IV Continuous <Continuous>  tamsulosin 0.4 milliGRAM(s) Oral at bedtime      TELEMETRY: 	    ECG:  	  RADIOLOGY:   DIAGNOSTIC TESTING:  [ ] Echocardiogram:  [ ]  Catheterization:  [ ] Stress Test:    OTHER: 	    LABS:	 	                            11.4   5.48  )-----------( 332      ( 05 Aug 2021 07:08 )             35.3     08-05    138  |  100  |  21  ----------------------------<  95  4.1   |  25  |  0.70    Ca    9.6      05 Aug 2021 07:07  Mg     2.0     08-05    TPro  6.8  /  Alb  3.2<L>  /  TBili  0.2  /  DBili  x   /  AST  13  /  ALT  11  /  AlkPhos  83  08-05    PT/INR - ( 05 Aug 2021 07:09 )   PT: 12.4 sec;   INR: 1.04 ratio         PTT - ( 05 Aug 2021 07:09 )  PTT:29.9 sec        
CARDIOLOGY FOLLOW UP - Dr. Briggs  Date of Service: 8/6/21  CC: denies cp, sob, and palpitations     Review of Systems:  Constitutional: No fever, weight loss, or fatigue  Respiratory: No cough, wheezing, or hemoptysis, no shortness of breath  Cardiovascular: No chest pain, palpitations, passing out, dizziness, or leg swelling  Gastrointestinal: No abd or epigastric pain.  No nausea, vomiting, or hematemesis; no diarrhea or constipation, no melena or hematochezia  Vascular: no edema       PHYSICAL EXAM:  T(C): 37 (08-06-21 @ 08:31), Max: 37 (08-06-21 @ 08:31)  HR: 90 (08-06-21 @ 08:31) (62 - 90)  BP: 139/76 (08-06-21 @ 08:31) (96/59 - 151/79)  RR: 18 (08-06-21 @ 08:31) (15 - 19)  SpO2: 92% (08-06-21 @ 08:31) (92% - 100%)  Wt(kg): --  I&O's Summary    05 Aug 2021 07:01  -  06 Aug 2021 07:00  --------------------------------------------------------  IN: 1240 mL / OUT: 300 mL / NET: 940 mL        Appearance: Normal	  Cardiovascular: Normal S1 S2,RRR, No JVD, No murmurs  Respiratory: Lungs clear to auscultation	  Gastrointestinal:  Soft, Non-tender, + BS	  Extremities: Normal range of motion, No clubbing, cyanosis or edema      Home Medications:  aspirin 81 mg oral tablet: orally once a day (31 Jul 2021 11:32)  famotidine 40 mg oral tablet: 1 tab(s) orally 2 times a day (31 Jul 2021 11:32)  furosemide 20 mg oral tablet: 1 tab(s) orally once a day (31 Jul 2021 11:32)  Metoprolol Succinate  mg oral tablet, extended release: 1 tab(s) orally once a day (at bedtime) (31 Jul 2021 11:32)  prasugrel 5 mg oral tablet: 1 tab(s) orally once a day (31 Jul 2021 11:32)  ranolazine 500 mg oral tablet, extended release: 1 tab(s) orally 2 times a day (31 Jul 2021 11:32)  rosuvastatin 20 mg oral tablet: 1 tab(s) orally once a day (at bedtime) (31 Jul 2021 11:32)      MEDICATIONS  (STANDING):  atorvastatin 80 milliGRAM(s) Oral at bedtime  chlorhexidine 2% Cloths 1 Application(s) Topical daily  ertapenem  IVPB      ertapenem  IVPB 1000 milliGRAM(s) IV Intermittent every 24 hours  famotidine    Tablet 40 milliGRAM(s) Oral two times a day  furosemide    Tablet 20 milliGRAM(s) Oral daily  heparin   Injectable 5000 Unit(s) SubCutaneous every 12 hours  melatonin 3 milliGRAM(s) Oral at bedtime  metoprolol succinate  milliGRAM(s) Oral daily  ranolazine 500 milliGRAM(s) Oral two times a day  sodium chloride 0.9%. 1000 milliLiter(s) (75 mL/Hr) IV Continuous <Continuous>  tamsulosin 0.4 milliGRAM(s) Oral at bedtime      TELEMETRY: 	    ECG:  	  RADIOLOGY:   DIAGNOSTIC TESTING:  [ ] Echocardiogram:  [ ]  Catheterization:  [ ] Stress Test:    OTHER: 	    LABS:	 	                            11.1   8.53  )-----------( 287      ( 06 Aug 2021 07:11 )             34.4     08-05    138  |  100  |  21  ----------------------------<  95  4.1   |  25  |  0.70    Ca    9.6      05 Aug 2021 07:07  Mg     2.0     08-05    TPro  6.8  /  Alb  3.2<L>  /  TBili  0.2  /  DBili  x   /  AST  13  /  ALT  11  /  AlkPhos  83  08-05    PT/INR - ( 05 Aug 2021 07:09 )   PT: 12.4 sec;   INR: 1.04 ratio         PTT - ( 05 Aug 2021 07:09 )  PTT:29.9 sec        
CC: no events    TELEMETRY:     PHYSICAL EXAM:    T(C): 37.2 (08-07-21 @ 08:22), Max: 37.2 (08-07-21 @ 08:22)  HR: 81 (08-07-21 @ 08:22) (80 - 88)  BP: 134/79 (08-07-21 @ 08:22) (124/69 - 152/83)  RR: 18 (08-07-21 @ 08:22) (18 - 18)  SpO2: 94% (08-07-21 @ 08:22) (92% - 94%)  Wt(kg): --  I&O's Summary    06 Aug 2021 07:01  -  07 Aug 2021 07:00  --------------------------------------------------------  IN: 240 mL / OUT: 1150 mL / NET: -910 mL        Appearance: Normal	  Cardiovascular: Normal S1 S2,RRR, No JVD, No murmurs  Respiratory: Lungs clear to auscultation	  Gastrointestinal:  Soft, Non-tender, + BS	  Extremities: Normal range of motion, No clubbing, cyanosis or edema  Vascular: Peripheral pulses palpable 2+ bilaterally     LABS:	 	                          11.1   6.55  )-----------( 230      ( 07 Aug 2021 07:36 )             35.4     08-07    133<L>  |  99  |  16  ----------------------------<  80  4.6   |  23  |  0.70    Ca    9.4      07 Aug 2021 07:36            CARDIAC MARKERS:        MEDICATIONS  (STANDING):  atorvastatin 80 milliGRAM(s) Oral at bedtime  chlorhexidine 2% Cloths 1 Application(s) Topical daily  ertapenem  IVPB      ertapenem  IVPB 1000 milliGRAM(s) IV Intermittent every 24 hours  famotidine    Tablet 40 milliGRAM(s) Oral two times a day  furosemide    Tablet 20 milliGRAM(s) Oral daily  heparin   Injectable 5000 Unit(s) SubCutaneous every 12 hours  melatonin 3 milliGRAM(s) Oral at bedtime  metoprolol succinate  milliGRAM(s) Oral daily  ranolazine 500 milliGRAM(s) Oral two times a day  sodium chloride 0.9%. 1000 milliLiter(s) (75 mL/Hr) IV Continuous <Continuous>  tamsulosin 0.4 milliGRAM(s) Oral at bedtime  
Patient is a 76y old  Female who presents with a chief complaint of renal colic (01 Aug 2021 10:02)      SUBJECTIVE / OVERNIGHT EVENTS: No new complaints.   Review of Systems  chest pain no  palpitations no  sob no  nausea no  headache no    MEDICATIONS  (STANDING):  atorvastatin 80 milliGRAM(s) Oral at bedtime  cefTRIAXone   IVPB 1000 milliGRAM(s) IV Intermittent every 12 hours  famotidine    Tablet 40 milliGRAM(s) Oral two times a day  furosemide    Tablet 20 milliGRAM(s) Oral daily  heparin   Injectable 5000 Unit(s) SubCutaneous every 12 hours  metoprolol succinate  milliGRAM(s) Oral daily  ranolazine 500 milliGRAM(s) Oral two times a day  sodium chloride 0.9%. 1000 milliLiter(s) (75 mL/Hr) IV Continuous <Continuous>  tamsulosin 0.4 milliGRAM(s) Oral at bedtime    MEDICATIONS  (PRN):  acetaminophen   Tablet .. 650 milliGRAM(s) Oral every 6 hours PRN Temp greater or equal to 38C (100.4F), Mild Pain (1 - 3)      Vital Signs Last 24 Hrs  T(C): 36.9 (01 Aug 2021 10:15), Max: 38.1 (2021 23:33)  T(F): 98.5 (01 Aug 2021 10:15), Max: 100.6 (2021 23:33)  HR: 78 (01 Aug 2021 10:15) (72 - 86)  BP: 100/58 (01 Aug 2021 10:15) (100/58 - 153/73)  BP(mean): --  RR: 18 (01 Aug 2021 10:15) (18 - 18)  SpO2: 94% (01 Aug 2021 10:15) (94% - 96%)    PHYSICAL EXAM:  GENERAL: NAD, well-developed  HEAD:  Atraumatic, Normocephalic  EYES: EOMI, PERRLA, conjunctiva and sclera clear  NECK: Supple, No JVD  CHEST/LUNG: Clear to auscultation bilaterally; No wheeze  HEART: Regular rate and rhythm; No murmurs, rubs, or gallops  ABDOMEN: Soft, Nontender, Nondistended; Bowel sounds present  EXTREMITIES:  2+ Peripheral Pulses, No clubbing, cyanosis, or edema  PSYCH: AAOx3  NEUROLOGY: non-focal  SKIN: No rashes or lesions    LABS:                        11.7   8.36  )-----------( 269      ( 01 Aug 2021 07:21 )             36.9     08    137  |  100  |  13  ----------------------------<  98  3.8   |  27  |  0.66    Ca    9.4      01 Aug 2021 07:21    TPro  7.2  /  Alb  3.3  /  TBili  0.3  /  DBili  x   /  AST  25  /  ALT  11  /  AlkPhos  81            Urinalysis Basic - ( 2021 21:41 )    Color: BROWN / Appearance: Turbid / S.018 / pH: x  Gluc: x / Ketone: Negative  / Bili: Negative / Urobili: Negative   Blood: x / Protein: 100 mg/dL / Nitrite: Positive   Leuk Esterase: Large / RBC: >50 /hpf / WBC >50 /HPF   Sq Epi: x / Non Sq Epi: 1 / Bacteria: Many        Culture - Urine (collected 2021 03:38)  Source: Clean Catch Clean Catch (Midstream)  Preliminary Report (01 Aug 2021 14:19):    >100,000 CFU/ml Gram Negative Rods        RADIOLOGY & ADDITIONAL TESTS:    Imaging Personally Reviewed:    Consultant(s) Notes Reviewed:      Care Discussed with Consultants/Other Providers:  
Patient is a 76y old  Female who presents with a chief complaint of renal colic (01 Aug 2021 10:02)    Being followed by ID for UTI, nephrolithiasis    Interval history:feels well  isolate ESBL-abx changed by me   No acute events      ROS:no urinary complaints   No cough,SOB,CP  No N/V/D./abd pain  No other complaints      Antimicrobials:    ertapenem  IVPB        Other medications reviewed    Vital Signs Last 24 Hrs  T(C): 36.6 (08-03-21 @ 08:30), Max: 36.8 (08-03-21 @ 00:04)  T(F): 97.9 (08-03-21 @ 08:30), Max: 98.3 (08-03-21 @ 00:04)  HR: 76 (08-03-21 @ 08:30) (71 - 82)  BP: 127/74 (08-03-21 @ 08:30) (127/74 - 178/80)  BP(mean): --  RR: 18 (08-03-21 @ 08:30) (18 - 18)  SpO2: 94% (08-03-21 @ 08:30) (94% - 96%)    Physical Exam:  HEENT PERRLA EOMI    No oral exudate or erythema    Chest Good AE,CTA    CVS RRR S1 S2 WNl No murmur or rub or gallop    Abd soft BS normal obese  No tenderness no masses    IV site no erythema tenderness or discharge    CNS AAO X 3 no focal  Lab Data:                          11.3   5.95  )-----------( 277      ( 03 Aug 2021 06:48 )             35.1       08-03    136  |  100  |  14  ----------------------------<  97  4.0   |  26  |  0.67    Ca    9.5      03 Aug 2021 06:48    TPro  6.8  /  Alb  3.1<L>  /  TBili  0.2  /  DBili  x   /  AST  13  /  ALT  7<L>  /  AlkPhos  71  08-03        Culture - Blood (collected 01 Aug 2021 05:49)  Source: .Blood Blood-Peripheral  Preliminary Report (02 Aug 2021 06:01):    No growth to date.    Culture - Blood (collected 01 Aug 2021 05:49)  Source: .Blood Blood-Peripheral  Preliminary Report (02 Aug 2021 06:01):    No growth to date.    Culture - Urine (collected 31 Jul 2021 03:38)  Source: Clean Catch Clean Catch (Midstream)  Final Report (02 Aug 2021 20:27):    >100,000 CFU/ml Klebsiella pneumoniae ESBL  Organism: Klebsiella pneumoniae ESBL (02 Aug 2021 20:27)  Organism: Klebsiella pneumoniae ESBL (02 Aug 2021 20:27)      -  Amikacin: S <=16      -  Amoxicillin/Clavulanic Acid: S <=8/4      -  Ampicillin: R >16 These ampicillin results predict results for amoxicillin      -  Ampicillin/Sulbactam: S <=4/2 Enterobacter, Citrobacter, and Serratia may develop resistance during prolonged therapy (3-4 days)      -  Aztreonam: R 8      -  Cefazolin: R >16 ([id number]) For uncomplicated UTI with K. pneumoniae, E. coli, or P. mirablis: CHINMAY <=16 is sensitive and CHINMAY >=32 is resistant. This also predicts results for oral agents cefaclor, cefdinir, cefpodoxime, cefprozil, cefuroxime axetil, cephalexin and locarbef for uncomplicated UTI. Note that some isolates may be susceptible to these agents while testing resistant to cefazolin.      -  Cefepime: R <=2      -  Cefoxitin: S <=8      -  Ceftriaxone: R 32 Enterobacter, Citrobacter, and Serratia may develop resistance during prolonged therapy      -  Ciprofloxacin: I 0.5      -  Ertapenem: S <=0.5      -  Gentamicin: S <=2      -  Imipenem: S <=1      -  Levofloxacin: S <=0.5      -  Meropenem: S <=1      -  Nitrofurantoin: S <=32 Should not be used to treat pyelonephritis      -  Piperacillin/Tazobactam: S <=8      -  Tigecycline: S <=2      -  Tobramycin: S <=2      -  Trimethoprim/Sulfamethoxazole: S <=0.5/9.5      Method Type: CHINMAY        < from: CT Abdomen and Pelvis No Cont (07.30.21 @ 17:47) >    IMPRESSION:  Moderate left hydroureteronephrosis secondary to a 0.7 cm calculus in the mid pelvic left ureteral segment. Additional renal calculi are identified on the left measuring up to 1.6 cm. Multiple right renal calculi identified measuring up to 9 mm. There is no evidence for right-sided hydronephrosis.      < end of copied text >                
Urology Progress Note    Overnight Events:  No acute urologic events overnight. Awaiting PCN    Review of Systems:   Constitutional: No weight loss, no weakness  CV: No chest pain, no chest pressure  Pulm: No shortness of breath, cough, or sputum    Physical Exam:  Vital signs  T(C): 36.5 (08-02-21 @ 08:08), Max: 37.3 (08-01-21 @ 16:00)  HR: 68 (08-02-21 @ 08:08)  BP: 138/74 (08-02-21 @ 08:08)  SpO2: 93% (08-02-21 @ 08:08)  Wt(kg): --    Gen: No acute distress. Normal mood  Abd: soft, non-tender, non-distended  : Non-palpable bladder    Labs:        08-05 @ 07:08    WBC 5.48  / Hct 35.3  / SCr --       08-05 @ 07:07    WBC --    / Hct --    / SCr 0.70               
CARDIOLOGY FOLLOW UP - Dr. Briggs  Date of Service: 8/3/21  CC: denies cp, sob, and palpitations     Review of Systems:  Constitutional: No fever, weight loss, or fatigue  Respiratory: No cough, wheezing, or hemoptysis, no shortness of breath  Cardiovascular: No chest pain, palpitations, passing out, dizziness, or leg swelling  Gastrointestinal: No abd or epigastric pain.  No nausea, vomiting, or hematemesis; no diarrhea or constipation, no melena or hematochezia  Vascular: no edema       PHYSICAL EXAM:  T(C): 36.6 (08-03-21 @ 08:30), Max: 36.8 (08-03-21 @ 00:04)  HR: 76 (08-03-21 @ 08:30) (71 - 82)  BP: 127/74 (08-03-21 @ 08:30) (127/74 - 178/80)  RR: 18 (08-03-21 @ 08:30) (18 - 18)  SpO2: 94% (08-03-21 @ 08:30) (94% - 96%)  Wt(kg): --  I&O's Summary    02 Aug 2021 07:01  -  03 Aug 2021 07:00  --------------------------------------------------------  IN: 770 mL / OUT: 2450 mL / NET: -1680 mL    03 Aug 2021 07:01  -  03 Aug 2021 13:43  --------------------------------------------------------  IN: 320 mL / OUT: 600 mL / NET: -280 mL        Appearance: Normal	  Cardiovascular: Normal S1 S2,RRR, No JVD, No murmurs  Respiratory: Lungs clear to auscultation	  Gastrointestinal:  Soft, Non-tender, + BS	  Extremities: Normal range of motion, No clubbing, cyanosis or edema      Home Medications:  aspirin 81 mg oral tablet: orally once a day (31 Jul 2021 11:32)  famotidine 40 mg oral tablet: 1 tab(s) orally 2 times a day (31 Jul 2021 11:32)  furosemide 20 mg oral tablet: 1 tab(s) orally once a day (31 Jul 2021 11:32)  Metoprolol Succinate  mg oral tablet, extended release: 1 tab(s) orally once a day (at bedtime) (31 Jul 2021 11:32)  prasugrel 5 mg oral tablet: 1 tab(s) orally once a day (31 Jul 2021 11:32)  ranolazine 500 mg oral tablet, extended release: 1 tab(s) orally 2 times a day (31 Jul 2021 11:32)  rosuvastatin 20 mg oral tablet: 1 tab(s) orally once a day (at bedtime) (31 Jul 2021 11:32)      MEDICATIONS  (STANDING):  atorvastatin 80 milliGRAM(s) Oral at bedtime  chlorhexidine 2% Cloths 1 Application(s) Topical daily  ertapenem  IVPB      famotidine    Tablet 40 milliGRAM(s) Oral two times a day  furosemide    Tablet 20 milliGRAM(s) Oral daily  heparin   Injectable 5000 Unit(s) SubCutaneous every 12 hours  metoprolol succinate  milliGRAM(s) Oral daily  ranolazine 500 milliGRAM(s) Oral two times a day  sodium chloride 0.9%. 1000 milliLiter(s) (75 mL/Hr) IV Continuous <Continuous>  tamsulosin 0.4 milliGRAM(s) Oral at bedtime      TELEMETRY: 	    ECG:  	NSR - no acute ischemic changes   RADIOLOGY:   DIAGNOSTIC TESTING:  [ ] Echocardiogram:  [ ]  Catheterization:  [ ] Stress Test:    OTHER: 	    LABS:	 	                            11.3   5.95  )-----------( 277      ( 03 Aug 2021 06:48 )             35.1     08-03    136  |  100  |  14  ----------------------------<  97  4.0   |  26  |  0.67    Ca    9.5      03 Aug 2021 06:48    TPro  6.8  /  Alb  3.1<L>  /  TBili  0.2  /  DBili  x   /  AST  13  /  ALT  7<L>  /  AlkPhos  71  08-03            
Patient is a 76y old  Female who presents with a chief complaint of renal colic (01 Aug 2021 10:02)      SUBJECTIVE / OVERNIGHT EVENTS: Comfortable without new complaints.   Review of Systems  chest pain no  palpitations no  sob no  nausea no  headache no    MEDICATIONS  (STANDING):  atorvastatin 80 milliGRAM(s) Oral at bedtime  chlorhexidine 2% Cloths 1 Application(s) Topical daily  ertapenem  IVPB      ertapenem  IVPB 1000 milliGRAM(s) IV Intermittent every 24 hours  famotidine    Tablet 40 milliGRAM(s) Oral two times a day  furosemide    Tablet 20 milliGRAM(s) Oral daily  heparin   Injectable 5000 Unit(s) SubCutaneous every 12 hours  melatonin 3 milliGRAM(s) Oral at bedtime  metoprolol succinate  milliGRAM(s) Oral daily  ranolazine 500 milliGRAM(s) Oral two times a day  sodium chloride 0.9%. 1000 milliLiter(s) (75 mL/Hr) IV Continuous <Continuous>  tamsulosin 0.4 milliGRAM(s) Oral at bedtime    MEDICATIONS  (PRN):  acetaminophen   Tablet .. 650 milliGRAM(s) Oral every 6 hours PRN Temp greater or equal to 38C (100.4F), Mild Pain (1 - 3)      Vital Signs Last 24 Hrs  T(C): 36.7 (04 Aug 2021 16:19), Max: 36.8 (04 Aug 2021 05:12)  T(F): 98 (04 Aug 2021 16:19), Max: 98.2 (04 Aug 2021 05:12)  HR: 95 (04 Aug 2021 16:19) (73 - 95)  BP: 143/81 (04 Aug 2021 16:19) (142/85 - 149/81)  BP(mean): --  RR: 18 (04 Aug 2021 16:19) (18 - 18)  SpO2: 95% (04 Aug 2021 16:19) (94% - 95%)    PHYSICAL EXAM:  GENERAL: NAD, well-developed  HEAD:  Atraumatic, Normocephalic  EYES: EOMI, PERRLA, conjunctiva and sclera clear  NECK: Supple, No JVD  CHEST/LUNG: Clear to auscultation bilaterally; No wheeze  HEART: Regular rate and rhythm; No murmurs, rubs, or gallops  ABDOMEN: Soft, Nontender, Nondistended; Bowel sounds present  EXTREMITIES:  2+ Peripheral Pulses, No clubbing, cyanosis, or edema  PSYCH: AAOx3  NEUROLOGY: non-focal  SKIN: No rashes or lesions    LABS:                        11.3   5.82  )-----------( 308      ( 04 Aug 2021 07:12 )             35.2     08-04    139  |  103  |  17  ----------------------------<  102<H>  4.0   |  25  |  0.68    Ca    9.9      04 Aug 2021 07:12    TPro  6.8  /  Alb  3.1<L>  /  TBili  0.2  /  DBili  x   /  AST  13  /  ALT  7<L>  /  AlkPhos  71  08-03                RADIOLOGY & ADDITIONAL TESTS:    Imaging Personally Reviewed:    Consultant(s) Notes Reviewed:      Care Discussed with Consultants/Other Providers:  
Patient is a 76y old  Female who presents with a chief complaint of renal colic (01 Aug 2021 10:02)    Being followed by ID for nephrolithiasis, UTI    Interval history:feels well  pain controlled  no dysuria  No acute events      ROS:  No cough,SOB,CP  No N/V/D./abd pain  No other complaints      Antimicrobials:    cefTRIAXone   IVPB 1000 milliGRAM(s) IV Intermittent every 12 hours    Other medications reviewed    Vital Signs Last 24 Hrs  T(C): 36.9 (08-01-21 @ 10:15), Max: 38.1 (07-31-21 @ 23:33)  T(F): 98.5 (08-01-21 @ 10:15), Max: 100.6 (07-31-21 @ 23:33)  HR: 78 (08-01-21 @ 10:15) (72 - 87)  BP: 100/58 (08-01-21 @ 10:15) (100/58 - 153/73)  BP(mean): --  RR: 18 (08-01-21 @ 10:15) (18 - 18)  SpO2: 94% (08-01-21 @ 10:15) (94% - 96%)    Physical Exam:        HEENT PERRLA EOMI    No oral exudate or erythema    Chest Good AE,CTA    CVS RRR S1 S2 WNl No murmur or rub or gallop    Abd soft BS normal obese  No tenderness no masses    IV site no erythema tenderness or discharge    CNS AAO X 3 no focal    Lab Data:                          11.7   8.36  )-----------( 269      ( 01 Aug 2021 07:21 )             36.9       08-01    137  |  100  |  13  ----------------------------<  98  3.8   |  27  |  0.66    Ca    9.4      01 Aug 2021 07:21    TPro  7.2  /  Alb  3.3  /  TBili  0.3  /  DBili  x   /  AST  25  /  ALT  11  /  AlkPhos  81  07-30          Cx pending    < from: CT Abdomen and Pelvis No Cont (07.30.21 @ 17:47) >  IMPRESSION:  Moderate left hydroureteronephrosis secondary to a 0.7 cm calculus in the mid pelvic left ureteral segment. Additional renal calculi are identified on the left measuring up to 1.6 cm. Multiple right renal calculi identified measuring up to 9 mm. There is no evidence for right-sided hydronephrosis.      < end of copied text >                
Urology Progress Note    Overnight Events:  No acute urologic events overnight. Resting in chair. No new complaints. No colic currently.    Review of Systems:   Constitutional: No weight loss, no weakness  CV: No chest pain, no chest pressure  Pulm: No shortness of breath, cough, or sputum    Physical Exam:  Vital signs  T(C): 36.5 (08-02-21 @ 08:08), Max: 37.3 (08-01-21 @ 16:00)  HR: 68 (08-02-21 @ 08:08)  BP: 138/74 (08-02-21 @ 08:08)  SpO2: 93% (08-02-21 @ 08:08)  Wt(kg): --    Gen: No acute distress. Normal mood  Abd: soft, non-tender, non-distended  : Non-palpable bladder    Labs:      08-02 @ 08:50    WBC 6.29  / Hct 32.9  / SCr 0.69     08-01 @ 07:21    WBC 8.36  / Hct 36.9  / SCr 0.66     08-02    137  |  101  |  12  ----------------------------<  111<H>  3.8   |  28  |  0.69    Ca    8.8      02 Aug 2021 08:50        
CARDIOLOGY FOLLOW UP - Dr. Briggs  Date of Service: 8/4/21  CC: denies cp, sob, and palpitations     Review of Systems:  Constitutional: No fever, weight loss, or fatigue  Respiratory: No cough, wheezing, or hemoptysis, no shortness of breath  Cardiovascular: No chest pain, palpitations, passing out, dizziness, or leg swelling  Gastrointestinal: No abd or epigastric pain.  No nausea, vomiting, or hematemesis; no diarrhea or constipation, no melena or hematochezia  Vascular: no edema       PHYSICAL EXAM:  T(C): 36.7 (08-04-21 @ 07:38), Max: 36.8 (08-04-21 @ 05:12)  HR: 73 (08-04-21 @ 07:38) (73 - 79)  BP: 145/78 (08-04-21 @ 07:38) (133/66 - 149/81)  RR: 18 (08-04-21 @ 07:38) (18 - 18)  SpO2: 94% (08-04-21 @ 07:38) (94% - 95%)  Wt(kg): --  I&O's Summary    03 Aug 2021 07:01  -  04 Aug 2021 07:00  --------------------------------------------------------  IN: 640 mL / OUT: 600 mL / NET: 40 mL    04 Aug 2021 07:01  -  04 Aug 2021 13:05  --------------------------------------------------------  IN: 0 mL / OUT: 1000 mL / NET: -1000 mL        Appearance: Normal	  Cardiovascular: Normal S1 S2,RRR, No JVD, No murmurs  Respiratory: Lungs clear to auscultation	  Gastrointestinal:  Soft, Non-tender, + BS	  Extremities: Normal range of motion, No clubbing, cyanosis or edema      Home Medications:  aspirin 81 mg oral tablet: orally once a day (31 Jul 2021 11:32)  famotidine 40 mg oral tablet: 1 tab(s) orally 2 times a day (31 Jul 2021 11:32)  furosemide 20 mg oral tablet: 1 tab(s) orally once a day (31 Jul 2021 11:32)  Metoprolol Succinate  mg oral tablet, extended release: 1 tab(s) orally once a day (at bedtime) (31 Jul 2021 11:32)  prasugrel 5 mg oral tablet: 1 tab(s) orally once a day (31 Jul 2021 11:32)  ranolazine 500 mg oral tablet, extended release: 1 tab(s) orally 2 times a day (31 Jul 2021 11:32)  rosuvastatin 20 mg oral tablet: 1 tab(s) orally once a day (at bedtime) (31 Jul 2021 11:32)      MEDICATIONS  (STANDING):  atorvastatin 80 milliGRAM(s) Oral at bedtime  chlorhexidine 2% Cloths 1 Application(s) Topical daily  ertapenem  IVPB      ertapenem  IVPB 1000 milliGRAM(s) IV Intermittent every 24 hours  famotidine    Tablet 40 milliGRAM(s) Oral two times a day  furosemide    Tablet 20 milliGRAM(s) Oral daily  heparin   Injectable 5000 Unit(s) SubCutaneous every 12 hours  melatonin 3 milliGRAM(s) Oral at bedtime  metoprolol succinate  milliGRAM(s) Oral daily  ranolazine 500 milliGRAM(s) Oral two times a day  sodium chloride 0.9%. 1000 milliLiter(s) (75 mL/Hr) IV Continuous <Continuous>  tamsulosin 0.4 milliGRAM(s) Oral at bedtime      TELEMETRY: 	    ECG:  	  RADIOLOGY:   DIAGNOSTIC TESTING:  [ ] Echocardiogram:  [ ]  Catheterization:  [ ] Stress Test:    OTHER: 	    LABS:	 	                            11.3   5.82  )-----------( 308      ( 04 Aug 2021 07:12 )             35.2     08-04    139  |  103  |  17  ----------------------------<  102<H>  4.0   |  25  |  0.68    Ca    9.9      04 Aug 2021 07:12    TPro  6.8  /  Alb  3.1<L>  /  TBili  0.2  /  DBili  x   /  AST  13  /  ALT  7<L>  /  AlkPhos  71  08-03            
Interventional Radiology Follow-Up Note    This is a 76y Female s/p left PCN on 8/5/21 in Interventional Radiology with Dr. Kirkland.     S: Patient seen and examined @ bedside. No complaints offered.     Medication:   ertapenem  IVPB: (08-07)  furosemide    Tablet: (08-07)  heparin   Injectable: (08-07)  metoprolol succinate ER: (08-07)  ranolazine: (08-07)  tamsulosin: (08-06)    Vitals: T(F): 99, Max: 99 (08:22)  HR: 81  BP: 134/79  RR: 18  SpO2: 94%    Physical Exam:  General: Nontoxic, in NAD, A&O x3.  Abdomen: soft, NTND, no peritoneal signs.  Extremities: No pedal edema or calf tenderness noted.  Drain Device: Drain intact attached to gravity bag, draining clear yellow urine. Dressing clean, dry, intact.    LABS:  WBC 6.55 / Hgb 11.1 / Hct 35.4 / Plt 230  Na 133 / K 4.6 / CO2 23 / Cl 99 / BUN 16 / Cr 0.70 / Glucose 80  ALT -- / AST -- / Alk Phos -- / Tbili --  Ptt -- / Pt -- / INR --      24hr PCN output: 500 cc        
Patient is a 76y old  Female who presents with a chief complaint of Left obstructive uropathy (07 Aug 2021 13:42)      SUBJECTIVE / OVERNIGHT EVENTS: Comfortable without new complaints.   Review of Systems  chest pain no  palpitations no  sob no  nausea no  headache no    MEDICATIONS  (STANDING):  atorvastatin 80 milliGRAM(s) Oral at bedtime  chlorhexidine 2% Cloths 1 Application(s) Topical daily  ertapenem  IVPB      ertapenem  IVPB 1000 milliGRAM(s) IV Intermittent every 24 hours  famotidine    Tablet 40 milliGRAM(s) Oral two times a day  fluconAZOLE   Tablet 150 milliGRAM(s) Oral once  furosemide    Tablet 20 milliGRAM(s) Oral daily  heparin   Injectable 5000 Unit(s) SubCutaneous every 12 hours  melatonin 3 milliGRAM(s) Oral at bedtime  metoprolol succinate  milliGRAM(s) Oral daily  ranolazine 500 milliGRAM(s) Oral two times a day  sodium chloride 0.9%. 1000 milliLiter(s) (75 mL/Hr) IV Continuous <Continuous>  tamsulosin 0.4 milliGRAM(s) Oral at bedtime    MEDICATIONS  (PRN):  acetaminophen   Tablet .. 650 milliGRAM(s) Oral every 6 hours PRN Temp greater or equal to 38C (100.4F), Mild Pain (1 - 3)  ondansetron Injectable 4 milliGRAM(s) IV Push once PRN Nausea and/or Vomiting      Vital Signs Last 24 Hrs  T(C): 37.2 (07 Aug 2021 08:22), Max: 37.2 (07 Aug 2021 08:22)  T(F): 99 (07 Aug 2021 08:22), Max: 99 (07 Aug 2021 08:22)  HR: 81 (07 Aug 2021 08:22) (80 - 88)  BP: 134/79 (07 Aug 2021 08:22) (124/69 - 152/83)  BP(mean): --  RR: 18 (07 Aug 2021 08:22) (18 - 18)  SpO2: 94% (07 Aug 2021 08:22) (92% - 94%)    PHYSICAL EXAM:  GENERAL: NAD, well-developed  HEAD:  Atraumatic, Normocephalic  EYES: EOMI, PERRLA, conjunctiva and sclera clear  NECK: Supple, No JVD  CHEST/LUNG: Clear to auscultation bilaterally; No wheeze  HEART: Regular rate and rhythm; No murmurs, rubs, or gallops  ABDOMEN: Soft, Nontender, Nondistended; Bowel sounds present L NT with clear urine.   EXTREMITIES:  2+ Peripheral Pulses, No clubbing, cyanosis, or edema  PSYCH: AAOx3  NEUROLOGY: non-focal  SKIN: No rashes or lesions    LABS:                        11.1   6.55  )-----------( 230      ( 07 Aug 2021 07:36 )             35.4     08-07    133<L>  |  99  |  16  ----------------------------<  80  4.6   |  23  |  0.70    Ca    9.4      07 Aug 2021 07:36                Culture - Urine (collected 05 Aug 2021 22:16)  Source: Kidney Nephrostomy - Left  Preliminary Report (06 Aug 2021 18:48):    No growth    Culture - Urine (collected 05 Aug 2021 22:13)  Source: Kidney Nephrostomy - Left  Preliminary Report (06 Aug 2021 18:48):    No growth        RADIOLOGY & ADDITIONAL TESTS:    Imaging Personally Reviewed:    Consultant(s) Notes Reviewed:      Care Discussed with Consultants/Other Providers:  
Patient is a 76y old  Female who presents with a chief complaint of renal colic (01 Aug 2021 10:02)      SUBJECTIVE / OVERNIGHT EVENTS: Comfortable without new complaints.   Review of Systems  chest pain no  palpitations no  sob no  nausea no  headache no    MEDICATIONS  (STANDING):  atorvastatin 80 milliGRAM(s) Oral at bedtime  chlorhexidine 2% Cloths 1 Application(s) Topical daily  ertapenem  IVPB      ertapenem  IVPB 1000 milliGRAM(s) IV Intermittent every 24 hours  famotidine    Tablet 40 milliGRAM(s) Oral two times a day  furosemide    Tablet 20 milliGRAM(s) Oral daily  heparin   Injectable 5000 Unit(s) SubCutaneous every 12 hours  melatonin 3 milliGRAM(s) Oral at bedtime  metoprolol succinate  milliGRAM(s) Oral daily  ranolazine 500 milliGRAM(s) Oral two times a day  sodium chloride 0.9%. 1000 milliLiter(s) (75 mL/Hr) IV Continuous <Continuous>  tamsulosin 0.4 milliGRAM(s) Oral at bedtime    MEDICATIONS  (PRN):  acetaminophen   Tablet .. 650 milliGRAM(s) Oral every 6 hours PRN Temp greater or equal to 38C (100.4F), Mild Pain (1 - 3)  ondansetron Injectable 4 milliGRAM(s) IV Push once PRN Nausea and/or Vomiting      Vital Signs Last 24 Hrs  T(C): 36.7 (06 Aug 2021 15:48), Max: 37 (06 Aug 2021 08:31)  T(F): 98.1 (06 Aug 2021 15:48), Max: 98.6 (06 Aug 2021 08:31)  HR: 88 (06 Aug 2021 15:48) (87 - 90)  BP: 124/69 (06 Aug 2021 15:48) (96/59 - 151/79)  BP(mean): --  RR: 18 (06 Aug 2021 15:48) (18 - 18)  SpO2: 93% (06 Aug 2021 15:48) (92% - 93%)    PHYSICAL EXAM:  GENERAL: NAD, well-developed  HEAD:  Atraumatic, Normocephalic  EYES: EOMI, PERRLA, conjunctiva and sclera clear  NECK: Supple, No JVD  CHEST/LUNG: Clear to auscultation bilaterally; No wheeze  HEART: Regular rate and rhythm; No murmurs, rubs, or gallops  ABDOMEN: Soft, Nontender, Nondistended; Bowel sounds present L side NT with clear urine.  EXTREMITIES:  2+ Peripheral Pulses, No clubbing, cyanosis, or edema  PSYCH: AAOx3  NEUROLOGY: non-focal  SKIN: No rashes or lesions    LABS:                        11.1   8.53  )-----------( 287      ( 06 Aug 2021 07:11 )             34.4     08-05    138  |  100  |  21  ----------------------------<  95  4.1   |  25  |  0.70    Ca    9.6      05 Aug 2021 07:07  Mg     2.0     08-05    TPro  6.8  /  Alb  3.2<L>  /  TBili  0.2  /  DBili  x   /  AST  13  /  ALT  11  /  AlkPhos  83  08-05    PT/INR - ( 05 Aug 2021 07:09 )   PT: 12.4 sec;   INR: 1.04 ratio         PTT - ( 05 Aug 2021 07:09 )  PTT:29.9 sec          Culture - Urine (collected 05 Aug 2021 22:16)  Source: Kidney Nephrostomy - Left  Preliminary Report (06 Aug 2021 18:48):    No growth    Culture - Urine (collected 05 Aug 2021 22:13)  Source: Kidney Nephrostomy - Left  Preliminary Report (06 Aug 2021 18:48):    No growth        RADIOLOGY & ADDITIONAL TESTS:    Imaging Personally Reviewed:    Consultant(s) Notes Reviewed:      Care Discussed with Consultants/Other Providers:  
Patient is a 76y old  Female who presents with a chief complaint of renal colic (01 Aug 2021 10:02)      SUBJECTIVE / OVERNIGHT EVENTS: No new complaints.   Review of Systems  chest pain no  palpitations no  sob no  nausea no  headache no    MEDICATIONS  (STANDING):  atorvastatin 80 milliGRAM(s) Oral at bedtime  chlorhexidine 2% Cloths 1 Application(s) Topical daily  ertapenem  IVPB      famotidine    Tablet 40 milliGRAM(s) Oral two times a day  furosemide    Tablet 20 milliGRAM(s) Oral daily  heparin   Injectable 5000 Unit(s) SubCutaneous every 12 hours  metoprolol succinate  milliGRAM(s) Oral daily  ranolazine 500 milliGRAM(s) Oral two times a day  sodium chloride 0.9%. 1000 milliLiter(s) (75 mL/Hr) IV Continuous <Continuous>  tamsulosin 0.4 milliGRAM(s) Oral at bedtime    MEDICATIONS  (PRN):  acetaminophen   Tablet .. 650 milliGRAM(s) Oral every 6 hours PRN Temp greater or equal to 38C (100.4F), Mild Pain (1 - 3)      Vital Signs Last 24 Hrs  T(C): 36.7 (03 Aug 2021 15:55), Max: 36.8 (03 Aug 2021 00:04)  T(F): 98 (03 Aug 2021 15:55), Max: 98.3 (03 Aug 2021 00:04)  HR: 79 (03 Aug 2021 15:55) (76 - 82)  BP: 133/66 (03 Aug 2021 15:55) (127/74 - 178/80)  BP(mean): --  RR: 18 (03 Aug 2021 15:55) (18 - 18)  SpO2: 95% (03 Aug 2021 15:55) (94% - 96%)    PHYSICAL EXAM:  GENERAL: NAD, well-developed  HEAD:  Atraumatic, Normocephalic  EYES: EOMI, PERRLA, conjunctiva and sclera clear  NECK: Supple, No JVD  CHEST/LUNG: Clear to auscultation bilaterally; No wheeze  HEART: Regular rate and rhythm; No murmurs, rubs, or gallops  ABDOMEN: Soft, Nontender, Nondistended; Bowel sounds present  EXTREMITIES:  2+ Peripheral Pulses, No clubbing, cyanosis, or edema  PSYCH: AAOx3  NEUROLOGY: non-focal  SKIN: No rashes or lesions    LABS:                        11.3   5.95  )-----------( 277      ( 03 Aug 2021 06:48 )             35.1     08-03    136  |  100  |  14  ----------------------------<  97  4.0   |  26  |  0.67    Ca    9.5      03 Aug 2021 06:48    TPro  6.8  /  Alb  3.1<L>  /  TBili  0.2  /  DBili  x   /  AST  13  /  ALT  7<L>  /  AlkPhos  71  08-03              Culture - Blood (collected 01 Aug 2021 05:49)  Source: .Blood Blood-Peripheral  Preliminary Report (02 Aug 2021 06:01):    No growth to date.    Culture - Blood (collected 01 Aug 2021 05:49)  Source: .Blood Blood-Peripheral  Preliminary Report (02 Aug 2021 06:01):    No growth to date.        RADIOLOGY & ADDITIONAL TESTS:    Imaging Personally Reviewed:    Consultant(s) Notes Reviewed:      Care Discussed with Consultants/Other Providers:  
Patient is a 76y old  Female who presents with a chief complaint of renal colic (01 Aug 2021 10:02)     Being followed by ID for UTI    Interval history:s/p perc nephrostomy  No acute events      ROS:  No cough,SOB,CP  No N/V/D./abd pain  No other complaints      Antimicrobials:    ertapenem  IVPB      ertapenem  IVPB 1000 milliGRAM(s) IV Intermittent every 24 hours    Other medications reviewed    Vital Signs Last 24 Hrs  T(C): 37 (08-06-21 @ 08:31), Max: 37 (08-06-21 @ 08:31)  T(F): 98.6 (08-06-21 @ 08:31), Max: 98.6 (08-06-21 @ 08:31)  HR: 90 (08-06-21 @ 08:31) (62 - 90)  BP: 139/76 (08-06-21 @ 08:31) (96/59 - 151/79)  BP(mean): 72 (08-05-21 @ 15:30) (70 - 72)  RR: 18 (08-06-21 @ 08:31) (15 - 18)  SpO2: 92% (08-06-21 @ 08:31) (92% - 100%)    Physical Exam:        HEENT PERRLA EOMI    No oral exudate or erythema    Chest Good AE,CTA    CVS RRR S1 S2 WNl No murmur or rub or gallop    Abd soft BS normal No tenderness no masses  PCN no erythema or tenderness    IV site no erythema tenderness or discharge    CNS AAO X 3 no focal    Lab Data:                          11.1   8.53  )-----------( 287      ( 06 Aug 2021 07:11 )             34.4       08-05    138  |  100  |  21  ----------------------------<  95  4.1   |  25  |  0.70    Ca    9.6      05 Aug 2021 07:07  Mg     2.0     08-05    TPro  6.8  /  Alb  3.2<L>  /  TBili  0.2  /  DBili  x   /  AST  13  /  ALT  11  /  AlkPhos  83  08-05          < from: Xray Chest 1 View- PORTABLE-Urgent (Xray Chest 1 View- PORTABLE-Urgent .) (07.31.21 @ 01:08) >  IMPRESSION:  Clear lungs.    --- End of Report ---            < end of copied text >                
Patient is a 76y old  Female who presents with a chief complaint of renal colic (01 Aug 2021 10:02)    Being followed by ID for UTI, ESBl    Interval history:feels well  No acute events      ROS:  No cough,SOB,CP  No N/V/D./abd pain  No other complaints      Antimicrobials:    ertapenem  IVPB      ertapenem  IVPB 1000 milliGRAM(s) IV Intermittent every 24 hours    Other medications reviewed    Vital Signs Last 24 Hrs  T(C): 36.7 (08-04-21 @ 07:38), Max: 36.8 (08-04-21 @ 05:12)  T(F): 98 (08-04-21 @ 07:38), Max: 98.2 (08-04-21 @ 05:12)  HR: 73 (08-04-21 @ 07:38) (73 - 79)  BP: 145/78 (08-04-21 @ 07:38) (133/66 - 149/81)  BP(mean): --  RR: 18 (08-04-21 @ 07:38) (18 - 18)  SpO2: 94% (08-04-21 @ 07:38) (94% - 95%)    Physical Exam:  HEENT PERRLA EOMI    No oral exudate or erythema    Chest Good AE,CTA    CVS RRR S1 S2 WNl No murmur or rub or gallop    Abd soft BS normal obese  No tenderness no masses    IV site no erythema tenderness or discharge    CNS AAO X 3 no focal    Lab Data:                          11.3   5.82  )-----------( 308      ( 04 Aug 2021 07:12 )             35.2       08-04    139  |  103  |  17  ----------------------------<  102<H>  4.0   |  25  |  0.68    Ca    9.9      04 Aug 2021 07:12    TPro  6.8  /  Alb  3.1<L>  /  TBili  0.2  /  DBili  x   /  AST  13  /  ALT  7<L>  /  AlkPhos  71  08-03        Culture - Blood (collected 01 Aug 2021 05:49)  Source: .Blood Blood-Peripheral  Preliminary Report (02 Aug 2021 06:01):    No growth to date.    Culture - Blood (collected 01 Aug 2021 05:49)  Source: .Blood Blood-Peripheral  Preliminary Report (02 Aug 2021 06:01):    No growth to date.    Culture - Urine (collected 31 Jul 2021 03:38)  Source: Clean Catch Clean Catch (Midstream)  Final Report (02 Aug 2021 20:27):    >100,000 CFU/ml Klebsiella pneumoniae ESBL  Organism: Klebsiella pneumoniae ESBL (02 Aug 2021 20:27)  Organism: Klebsiella pneumoniae ESBL (02 Aug 2021 20:27)      -  Amikacin: S <=16      -  Amoxicillin/Clavulanic Acid: S <=8/4      -  Ampicillin: R >16 These ampicillin results predict results for amoxicillin      -  Ampicillin/Sulbactam: S <=4/2 Enterobacter, Citrobacter, and Serratia may develop resistance during prolonged therapy (3-4 days)      -  Aztreonam: R 8      -  Cefazolin: R >16 (MIC_CL_COM_ENTERIC_CEFAZU) For uncomplicated UTI with K. pneumoniae, E. coli, or P. mirablis: CHINMAY <=16 is sensitive and CHINMAY >=32 is resistant. This also predicts results for oral agents cefaclor, cefdinir, cefpodoxime, cefprozil, cefuroxime axetil, cephalexin and locarbef for uncomplicated UTI. Note that some isolates may be susceptible to these agents while testing resistant to cefazolin.      -  Cefepime: R <=2      -  Cefoxitin: S <=8      -  Ceftriaxone: R 32 Enterobacter, Citrobacter, and Serratia may develop resistance during prolonged therapy      -  Ciprofloxacin: I 0.5      -  Ertapenem: S <=0.5      -  Gentamicin: S <=2      -  Imipenem: S <=1      -  Levofloxacin: S <=0.5      -  Meropenem: S <=1      -  Nitrofurantoin: S <=32 Should not be used to treat pyelonephritis      -  Piperacillin/Tazobactam: S <=8      -  Tigecycline: S <=2      -  Tobramycin: S <=2      -  Trimethoprim/Sulfamethoxazole: S <=0.5/9.5      Method Type: CHINMAY        < from: Xray Chest 1 View- PORTABLE-Urgent (Xray Chest 1 View- PORTABLE-Urgent .) (07.31.21 @ 01:08) >  IMPRESSION:  Clear lungs.      < end of copied text >                
Patient is a 76y old  Female who presents with a chief complaint of renal colic (01 Aug 2021 10:02)    Being followed by ID for uti  nephrolithiasis    Interval history:pain controlled  no dysuria  No acute events      ROS:  No cough,SOB,CP  No N/V/D./abd pain  No other complaints      Antimicrobials:    cefTRIAXone   IVPB 1000 milliGRAM(s) IV Intermittent every 12 hours    Other medications reviewed    Vital Signs Last 24 Hrs  T(C): 36.5 (08-02-21 @ 08:08), Max: 37.3 (08-01-21 @ 16:00)  T(F): 97.7 (08-02-21 @ 08:08), Max: 99.1 (08-01-21 @ 16:00)  HR: 68 (08-02-21 @ 08:08) (68 - 86)  BP: 138/74 (08-02-21 @ 08:08) (138/74 - 168/71)  BP(mean): --  RR: 18 (08-02-21 @ 08:08) (18 - 18)  SpO2: 93% (08-02-21 @ 08:08) (93% - 95%)    Physical Exam:    HEENT PERRLA EOMI    No oral exudate or erythema    Chest Good AE,CTA    CVS RRR S1 S2 WNl No murmur or rub or gallop    Abd soft BS normal obese  No tenderness no masses    IV site no erythema tenderness or discharge    CNS AAO X 3 no focal    Lab Data:                          10.7   6.29  )-----------( 226      ( 02 Aug 2021 08:50 )             32.9       08-02    137  |  101  |  12  ----------------------------<  111<H>  3.8   |  28  |  0.69    Ca    8.8      02 Aug 2021 08:50          Culture - Blood (collected 01 Aug 2021 05:49)  Source: .Blood Blood-Peripheral  Preliminary Report (02 Aug 2021 06:01):    No growth to date.    Culture - Blood (collected 01 Aug 2021 05:49)  Source: .Blood Blood-Peripheral  Preliminary Report (02 Aug 2021 06:01):    No growth to date.    Culture - Urine (collected 31 Jul 2021 03:38)  Source: Clean Catch Clean Catch (Midstream)  Preliminary Report (01 Aug 2021 19:06):    >100,000 CFU/ml Klebsiella pneumoniae    < from: Xray Chest 1 View- PORTABLE-Urgent (Xray Chest 1 View- PORTABLE-Urgent .) (07.31.21 @ 01:08) >    IMPRESSION:  Clear lungs.    --- End of Report ---      < end of copied text >                    
Subjective  patient with no pain and feels well    Objective  no sp tenderness  Vital signs  T(F): , Max: 100.6 (07-31-21 @ 23:33)  HR: 77 (08-01-21 @ 03:30)  BP: 152/79 (08-01-21 @ 03:30)  SpO2: 95% (08-01-21 @ 03:30)    07-31 @ 07:01  -  08-01 @ 07:00  --------------------------------------------------------  IN: 0 mL / OUT: 300 mL / NET: -300 mL        Gen wdwn  Abd no pain     no cvat    Labs      08-01 @ 07:21    WBC 8.36  / Hct 36.9  / SCr 0.66     07-31 @ 04:37    WBC 6.59  / Hct 36.1  / SCr 0.88       Urine Cx: pending  Blood Cx: pending    Imaging  left renal and ureteral calculi with moderated rachael infante lp renal calculi

## 2021-08-07 NOTE — PROGRESS NOTE ADULT - PROVIDER SPECIALTY LIST ADULT
Cardiology
Cardiology
Infectious Disease
Internal Medicine
Intervent Radiology
Urology
Cardiology
Infectious Disease
Internal Medicine
Urology
Urology
Cardiology
Infectious Disease
Internal Medicine
Urology
Urology

## 2021-08-09 ENCOUNTER — NON-APPOINTMENT (OUTPATIENT)
Age: 76
End: 2021-08-09

## 2021-08-09 RX ORDER — NYSTATIN 100000 1/G
100000 POWDER TOPICAL 3 TIMES DAILY
Qty: 1 | Refills: 2 | Status: ACTIVE | COMMUNITY
Start: 2021-08-09 | End: 1900-01-01

## 2021-08-11 ENCOUNTER — NON-APPOINTMENT (OUTPATIENT)
Age: 76
End: 2021-08-11

## 2021-08-19 NOTE — H&P PST ADULT - NEUROLOGICAL
TRANSFER - OUT REPORT:    Verbal report given to Juliet(name) on Batres Rowlett  being transferred to [facility](unit) for routine post - op       Report consisted of patients Situation, Background, Assessment and   Recommendations(SBAR). Information from the following report(s) SBAR, OR Summary and MAR was reviewed with the receiving nurse. Lines:   Peripheral IV 08/19/21 Left;Posterior Wrist (Active)        Opportunity for questions and clarification was provided.       Patient transported with:   O2 @ 2 liters details… detailed exam

## 2021-08-27 ENCOUNTER — APPOINTMENT (OUTPATIENT)
Dept: INTERNAL MEDICINE | Facility: CLINIC | Age: 76
End: 2021-08-27
Payer: MEDICARE

## 2021-08-27 VITALS
DIASTOLIC BLOOD PRESSURE: 74 MMHG | BODY MASS INDEX: 46.95 KG/M2 | HEART RATE: 73 BPM | RESPIRATION RATE: 16 BRPM | OXYGEN SATURATION: 97 % | SYSTOLIC BLOOD PRESSURE: 140 MMHG | WEIGHT: 265 LBS | HEIGHT: 63 IN

## 2021-08-27 DIAGNOSIS — N28.1 CYST OF KIDNEY, ACQUIRED: ICD-10-CM

## 2021-08-27 PROCEDURE — 99214 OFFICE O/P EST MOD 30 MIN: CPT

## 2021-08-27 RX ORDER — PRASUGREL 5 MG/1
5 TABLET, FILM COATED ORAL DAILY
Qty: 30 | Refills: 0 | Status: ACTIVE | COMMUNITY
Start: 2021-08-27

## 2021-08-29 NOTE — REVIEW OF SYSTEMS
[Fever] : no fever [Chills] : no chills [Chest Pain] : no chest pain [Palpitations] : no palpitations [Lower Ext Edema] : no lower extremity edema

## 2021-08-29 NOTE — HISTORY OF PRESENT ILLNESS
[FreeTextEntry1] : RPA [de-identified] : LINDA BRICE  is a 76 year old F with bronchitis, emphysema, TYLOR, spinal stenosis, Cervical Ca s/p ROBERTO, s/p Appendectomy, s/p cholecystectomy, s/p left lumpectomy, hx of  kidney stones presented today for follow up. She was hospitalized to [facility] 7/31/21-8/7/21 for left flank pain. CT revealed partially obstructive kidney stone but she could not pass it.  She had UTI with  + Klebsiella Pneumoniae ESBL+ though susceptible to Bactrim. She was discharged to home with left Nephrostomy and home visiting nurse checked her for ostomy care. Today is her last day taking Cipro as discharge medication. She came to our clinic with her sister who was taught on dressing change for pt. Pt was expecting pre-op evaluation today since she was seen by urology Dr. Goldberg 3 days ago. We did not receive Urology note yet. Pt was told the surgery would be endoscopic approach on 9/17/21 and PST was scheduled on 9/3/21. She will see her cardiologist from [facility] for cardiac clearance for surgery too.\par \par Denies headache, dizziness.\par Denies rash, fatigue, fever, weight loss, anorexia.\par Denies cough, wheezing.\par Denies CP, SOB, NAVARRO, edema, palpitations.\par Denies abdominal pain, N/V/D/C. Denies BRBPR, melena, dysphagia.\par Denies dysuria, frequency, hematuria, hesitancy.\par Denies weakness, numbness, gait instability.\par \par

## 2021-08-29 NOTE — ASSESSMENT
[FreeTextEntry1] : # Left hydroureteronephrosis with renal calculi and Rt renal calculi\par \par Currently stable with well functioning nephrostomy.\par Dr. Kuhn showed pt  CT scan image by pt's request.\par : Moderate left hydroureteronephrosis secondary to a 0.7 cm calculus in the mid pelvic left ureteral segment. Additional renal calculi are identified on the left measuring up to 1.6 cm. Multiple right renal calculi identified measuring up to 9 mm. There is no evidence for right-sided hydronephrosis. No space-occupying lesions of the right kidney noted. There is a 3.7 cm cyst noted within the lower pole aspect of the left kidney. A subcentimeter hypodense lesion is noted within the lower pole aspect of the left kidney, possible hyperdense cyst.\par \par Reviewed discharge medication list with pt: no change from pre hospitalization.\par Pt was taking Ranolazine 500mg bid, Prasugrel 5mg qd, ASA 81mg qd, Lasix 20mg qd, Metoprolol 100mg qd, Famotidine 40mg bid and Rosuvastatin 20mg qd. \par \par Surgery planned on 9/17/21 by Dr. Goldberg, nephrology group.\par PST is scheduled on 9/3/21\par Need cardiology clearance. \par \par Closely monitor nephrostomy and continue current medications.\par Pt will RTC on 9/9/21 PM for pre-op medical clearance. \par

## 2021-08-29 NOTE — END OF VISIT
[FreeTextEntry3] : Patient seen and examined in conjunction with Yamini Quinones. NP acting as practitioner and scribe.  I agree with the history and plan as outlined with modifications documented as appropriate.\par  [Time Spent: ___ minutes] : I have spent [unfilled] minutes of time on the encounter.

## 2021-08-29 NOTE — PHYSICAL EXAM
[No Acute Distress] : no acute distress [Normal Sclera/Conjunctiva] : normal sclera/conjunctiva [Normal Outer Ear/Nose] : the outer ears and nose were normal in appearance [No JVD] : no jugular venous distention [Normal] : normal rate, regular rhythm, normal S1 and S2 and no murmur heard [Soft] : abdomen soft [Non Tender] : non-tender [No Rash] : no rash [de-identified] : mild chronic b/l feet edema [de-identified] : obesely distended [de-identified] : Left posterior flank had a nephrostomy. Mild narrow erythema brett ostomy site but not warm on palpation, no discharge, not tender. Meg urine draining well.

## 2021-09-03 ENCOUNTER — OUTPATIENT (OUTPATIENT)
Dept: OUTPATIENT SERVICES | Facility: HOSPITAL | Age: 76
LOS: 1 days | End: 2021-09-03
Payer: MEDICARE

## 2021-09-03 VITALS
OXYGEN SATURATION: 97 % | WEIGHT: 259.04 LBS | RESPIRATION RATE: 20 BRPM | HEIGHT: 63 IN | TEMPERATURE: 98 F | DIASTOLIC BLOOD PRESSURE: 81 MMHG | HEART RATE: 73 BPM | SYSTOLIC BLOOD PRESSURE: 149 MMHG

## 2021-09-03 DIAGNOSIS — G47.33 OBSTRUCTIVE SLEEP APNEA (ADULT) (PEDIATRIC): ICD-10-CM

## 2021-09-03 DIAGNOSIS — Z90.49 ACQUIRED ABSENCE OF OTHER SPECIFIED PARTS OF DIGESTIVE TRACT: Chronic | ICD-10-CM

## 2021-09-03 DIAGNOSIS — I10 ESSENTIAL (PRIMARY) HYPERTENSION: ICD-10-CM

## 2021-09-03 DIAGNOSIS — I25.10 ATHEROSCLEROTIC HEART DISEASE OF NATIVE CORONARY ARTERY WITHOUT ANGINA PECTORIS: ICD-10-CM

## 2021-09-03 DIAGNOSIS — N20.0 CALCULUS OF KIDNEY: ICD-10-CM

## 2021-09-03 DIAGNOSIS — Z93.6 OTHER ARTIFICIAL OPENINGS OF URINARY TRACT STATUS: Chronic | ICD-10-CM

## 2021-09-03 DIAGNOSIS — Z98.49 CATARACT EXTRACTION STATUS, UNSPECIFIED EYE: Chronic | ICD-10-CM

## 2021-09-03 DIAGNOSIS — N81.4 UTEROVAGINAL PROLAPSE, UNSPECIFIED: Chronic | ICD-10-CM

## 2021-09-03 DIAGNOSIS — Z98.890 OTHER SPECIFIED POSTPROCEDURAL STATES: Chronic | ICD-10-CM

## 2021-09-03 DIAGNOSIS — Z90.710 ACQUIRED ABSENCE OF BOTH CERVIX AND UTERUS: Chronic | ICD-10-CM

## 2021-09-03 DIAGNOSIS — Z86.79 PERSONAL HISTORY OF OTHER DISEASES OF THE CIRCULATORY SYSTEM: ICD-10-CM

## 2021-09-03 DIAGNOSIS — Z01.818 ENCOUNTER FOR OTHER PREPROCEDURAL EXAMINATION: ICD-10-CM

## 2021-09-03 DIAGNOSIS — Z95.1 PRESENCE OF AORTOCORONARY BYPASS GRAFT: Chronic | ICD-10-CM

## 2021-09-03 LAB
ANION GAP SERPL CALC-SCNC: 13 MMOL/L — SIGNIFICANT CHANGE UP (ref 5–17)
BLD GP AB SCN SERPL QL: NEGATIVE — SIGNIFICANT CHANGE UP
BUN SERPL-MCNC: 16 MG/DL — SIGNIFICANT CHANGE UP (ref 7–23)
CALCIUM SERPL-MCNC: 9.5 MG/DL — SIGNIFICANT CHANGE UP (ref 8.4–10.5)
CHLORIDE SERPL-SCNC: 101 MMOL/L — SIGNIFICANT CHANGE UP (ref 96–108)
CO2 SERPL-SCNC: 24 MMOL/L — SIGNIFICANT CHANGE UP (ref 22–31)
CREAT SERPL-MCNC: 0.82 MG/DL — SIGNIFICANT CHANGE UP (ref 0.5–1.3)
GLUCOSE SERPL-MCNC: 118 MG/DL — HIGH (ref 70–99)
HCT VFR BLD CALC: 36.2 % — SIGNIFICANT CHANGE UP (ref 34.5–45)
HGB BLD-MCNC: 11.6 G/DL — SIGNIFICANT CHANGE UP (ref 11.5–15.5)
MCHC RBC-ENTMCNC: 30.9 PG — SIGNIFICANT CHANGE UP (ref 27–34)
MCHC RBC-ENTMCNC: 32 GM/DL — SIGNIFICANT CHANGE UP (ref 32–36)
MCV RBC AUTO: 96.5 FL — SIGNIFICANT CHANGE UP (ref 80–100)
NRBC # BLD: 0 /100 WBCS — SIGNIFICANT CHANGE UP (ref 0–0)
PLATELET # BLD AUTO: 306 K/UL — SIGNIFICANT CHANGE UP (ref 150–400)
POTASSIUM SERPL-MCNC: 3.9 MMOL/L — SIGNIFICANT CHANGE UP (ref 3.5–5.3)
POTASSIUM SERPL-SCNC: 3.9 MMOL/L — SIGNIFICANT CHANGE UP (ref 3.5–5.3)
RBC # BLD: 3.75 M/UL — LOW (ref 3.8–5.2)
RBC # FLD: 15.1 % — HIGH (ref 10.3–14.5)
RH IG SCN BLD-IMP: POSITIVE — SIGNIFICANT CHANGE UP
SODIUM SERPL-SCNC: 138 MMOL/L — SIGNIFICANT CHANGE UP (ref 135–145)
WBC # BLD: 6.47 K/UL — SIGNIFICANT CHANGE UP (ref 3.8–10.5)
WBC # FLD AUTO: 6.47 K/UL — SIGNIFICANT CHANGE UP (ref 3.8–10.5)

## 2021-09-03 PROCEDURE — 80048 BASIC METABOLIC PNL TOTAL CA: CPT

## 2021-09-03 PROCEDURE — 86901 BLOOD TYPING SEROLOGIC RH(D): CPT

## 2021-09-03 PROCEDURE — 87077 CULTURE AEROBIC IDENTIFY: CPT

## 2021-09-03 PROCEDURE — 87086 URINE CULTURE/COLONY COUNT: CPT

## 2021-09-03 PROCEDURE — G0463: CPT

## 2021-09-03 PROCEDURE — 85027 COMPLETE CBC AUTOMATED: CPT

## 2021-09-03 PROCEDURE — 87186 SC STD MICRODIL/AGAR DIL: CPT

## 2021-09-03 PROCEDURE — 86900 BLOOD TYPING SEROLOGIC ABO: CPT

## 2021-09-03 PROCEDURE — 86850 RBC ANTIBODY SCREEN: CPT

## 2021-09-03 RX ORDER — CEFAZOLIN SODIUM 1 G
2000 VIAL (EA) INJECTION ONCE
Refills: 0 | Status: DISCONTINUED | OUTPATIENT
Start: 2021-09-17 | End: 2021-10-01

## 2021-09-03 NOTE — H&P PST ADULT - VENOUS THROMBOEMBOLISM CURRENT STATUS
(1) swollen legs (current)/(1) other risk factor (includes escalating BMI, pack-years of smoking, diabetes requiring insulin, chemotherapy, female gender and length of surgery)/(2) malignancy (present or previous)

## 2021-09-03 NOTE — H&P PST ADULT - PROBLEM SELECTOR PLAN 1
scheduled for left percutaneous nephrolithotomy   preop instruction discussed with both patient and her sister, both verbalized understanding  urine sample collected and sent to lab for culture, pending result

## 2021-09-03 NOTE — H&P PST ADULT - NSICDXPASTMEDICALHX_GEN_ALL_CORE_FT
PAST MEDICAL HISTORY:  CAD (coronary artery disease)     Cataract     Chronic bronchitis     Emphysema lung mild, on CT scan 2017    History of unstable angina last admission 4/2021  prompted cardiac and stenting   on prasugrel   and on aspirin   last nitro since april 2021    HTN (hypertension) diagnosed 10/2017    Lumbar herniated disc     Malignant neoplasm of cervix, unspecified site s/p [facility] 1978    Morbid obesity with BMI of 40.0-44.9, adult     TYLOR (obstructive sleep apnea) as per [facility] criteria    Proteinuria     Renal calculus, bilateral     Smoker 1/2 pack X 55 years    Spinal stenosis of lumbar region      PAST MEDICAL HISTORY:  Bulging lumbar disc     CAD (coronary artery disease)     Cataract     Chronic bronchitis     Emphysema lung mild, on CT scan 2017    History of sciatica     History of spinal stenosis dx 2008    History of unstable angina last admission 4/2021  prompted cardiac and stenting   on prasugrel   and on aspirin   last nitro since april 2021    HTN (hypertension) diagnosed 10/2017    Lumbar herniated disc     Malignant neoplasm of cervix, unspecified site s/p ROBERTO 1978    Morbid obesity with BMI of 40.0-44.9, adult     TYLOR (obstructive sleep apnea) as per [facility] criteria    Proteinuria     Renal calculus, bilateral     Smoker 1/2 pack X 55 years    Spinal stenosis of lumbar region      PAST MEDICAL HISTORY:  Bulging lumbar disc     CAD (coronary artery disease)     Calculus of kidney     Cataract     Chronic bronchitis     Emphysema lung mild, on CT scan 2017    H/O heart failure last admission 2019  EF 50% (4/2021)    History of sciatica     History of spinal stenosis dx 2008    History of unstable angina last admission 4/27/2021 at [facility]   prompted left cardiac cath with coronary artery stenting (LAD)   on prasugrel   and on aspirin   last nitro since april 2021  cath done by Dr. Dwight Metcalf    HTN (hypertension) diagnosed 10/2017    Lumbar herniated disc     Malignant neoplasm of cervix, unspecified site s/p ROBERTO 1978    Morbid obesity with BMI of 40.0-44.9, adult     TYLOR (obstructive sleep apnea) as per [facility] criteria    Proteinuria     Smoker 1/2 pack X 55 years    Snores sleep study 6/2021, no result    Spinal stenosis of lumbar region

## 2021-09-03 NOTE — H&P PST ADULT - HISTORY OF PRESENT ILLNESS
76 year old female with h/o HF (last admission 5/2019) s/p CABGx4, CAD, unstable angina (last admission 4/2021 prompted left cath with coronary artery stenting x1 -LAD at [facility]), HTN, hyperlipidemia, obesity, ?TYLOR, spinal stenosis, former smoker (55pk/yrs), mild emphysema, sciatica, presents to preadmission testing today for scheduled left percutaneous nephrolithotomy for calculus of kidney. Patient was admitted at [facility] 76 year old female with h/o HF (last admission 5/2019) s/p CABGx4, CAD, unstable angina (last admission 4/2021 prompted left cath with coronary artery stenting x1 -LAD at [facility]), HTN, hyperlipidemia, obesity, ?TYLOR, spinal stenosis, former smoker (55pk/yrs), mild emphysema, sciatica, presents to preadmission testing today for scheduled left percutaneous nephrolithotomy for calculus of kidney. Patient was admitted at [facility] on July 31- August 7 for flank/back pain, CT scan revealed obstructed kidney stone, complicated by UTI with + klebsiella pneumoniae. Patient was unable to pass stone, left nephrostomy placed and discharged home on antibiotic (last dose on 8/26/2021), now returning on 9/17/2021 for proposed surgery with Dr. Broderick. Patient denies any malaise, afebrile.   patient denies any s/s of covid-19 infection, nor any known exposure.    Scheduled for covid-19 PCR swab on 9/14/2021 76 year old female with h/o HF (last admission 5/2019) s/p CABGx4, CAD, unstable angina (last admission 4/2021 prompted left cath with coronary artery stenting x1 -LAD at [facility]), HTN, hyperlipidemia, obesity, ?TYLOR, spinal stenosis, former smoker (55pk/yrs), mild emphysema, sciatica, cervical ca s/p ROBERTO, presents to preadmission testing today for scheduled left percutaneous nephrolithotomy for calculus of kidney. Patient was admitted at [facility] on July 31- August 7 for flank/back pain, CT scan revealed obstructed kidney stone, complicated by UTI with + klebsiella pneumoniae. Patient was unable to pass stone, left nephrostomy placed and discharged home on antibiotic (last dose on 8/26/2021), now returning on 9/17/2021 for proposed surgery with Dr. Broderick. Patient denies any malaise, afebrile.   patient denies any s/s of covid-19 infection, nor any known exposure.    Scheduled for covid-19 PCR swab on 9/14/2021

## 2021-09-03 NOTE — H&P PST ADULT - PROBLEM SELECTOR PLAN 2
patient is scheduled for evaluation with her cardiologist and plan for prasugrel will be discussed   recent ns ekg result on sunrise   last cath report on file

## 2021-09-03 NOTE — H&P PST ADULT - NSICDXPASTSURGICALHX_GEN_ALL_CORE_FT
PAST SURGICAL HISTORY:  H/O abdominal hysterectomy ROBERTO 1978    H/O lithotripsy ESWL 1986 left, 2004 right, 2007 left    History of appendectomy 1954    Prolapse, uterovaginal s/p TVT Sling 2000    S/P breast lumpectomy left, benign    S/P CABG x 4 5/31/2019    S/P cataract surgery bilateral, 2015    S/P cholecystectomy 2005    S/P ERCP 2006     PAST SURGICAL HISTORY:  H/O abdominal hysterectomy ROBERTO 1978  cervical cancer   no radiation no chemo    H/O lithotripsy ESWL 1986 left, 2004 right, 2007 left    History of appendectomy 1954    Prolapse, uterovaginal s/p TVT Sling 2000    S/P breast lumpectomy left, benign    S/P CABG x 4 5/31/2019    S/P cataract surgery bilateral, 2015    S/P cholecystectomy 2005    S/P colonoscopy with polypectomy     S/P ERCP 2006     PAST SURGICAL HISTORY:  H/O abdominal hysterectomy ROBERTO 1978  cervical cancer   no radiation no chemo    H/O lithotripsy ESWL 1986 left, 2004 right, 2007 left    History of appendectomy 1954    Nephrostomy status left nephrostomy tube placement 8/5/2021    Prolapse, uterovaginal s/p TVT Sling 2000    S/P breast lumpectomy left, benign    S/P CABG x 4 5/31/2019    S/P cataract surgery bilateral, 2015    S/P cholecystectomy 2005    S/P colonoscopy with polypectomy     S/P ERCP 2006

## 2021-09-09 ENCOUNTER — APPOINTMENT (OUTPATIENT)
Dept: INTERNAL MEDICINE | Facility: CLINIC | Age: 76
End: 2021-09-09
Payer: MEDICARE

## 2021-09-09 VITALS
HEART RATE: 75 BPM | SYSTOLIC BLOOD PRESSURE: 144 MMHG | BODY MASS INDEX: 47.13 KG/M2 | WEIGHT: 266 LBS | RESPIRATION RATE: 16 BRPM | DIASTOLIC BLOOD PRESSURE: 78 MMHG | OXYGEN SATURATION: 96 % | HEIGHT: 63 IN

## 2021-09-09 DIAGNOSIS — S81.801A UNSPECIFIED OPEN WOUND, RIGHT LOWER LEG, INITIAL ENCOUNTER: ICD-10-CM

## 2021-09-09 PROCEDURE — 99214 OFFICE O/P EST MOD 30 MIN: CPT

## 2021-09-09 NOTE — ASSESSMENT
[High Risk Surgery - Intraperitoneal, Intrathoracic or Supringuinal Vascular Procedures] : High Risk Surgery - Intraperitoneal, Intrathoracic or Supringuinal Vascular Procedures - No (0) [Ischemic Heart Disease] : Ischemic Heart Disease  - Yes (1) [Congestive Heart Failure] : Congestive Heart Failure - Yes (1) [Prior Cerebrovascular Accident or TIA] : Prior Cerebrovascular Accident or TIA - No (0) [Creatinine >= 2mg/dL (1 Point)] : Creatinine >= 2mg/dL - No (0) [Insulin-dependent Diabetic (1 Point)] : Insulin-dependent Diabetic - No (0) [2] : 2 , RCRI Class: III, Risk of Post-Op Cardiac Complications: 10.1%, 95% CI for Risk Estimate: 8.1% - 12.6% [Patient Optimized for Surgery] : Patient optimized for surgery [As per surgery] : as per surgery [FreeTextEntry4] : 76-year-old female with a history of CAD, status post MI and CABG, chronic angina, stents, obesity, hypertension here for presurgical evaluation prior to removal of a large renal stone that is blocking the ureter.  She currently has a nephrostomy tube in place.  Urine culture sent at the time of PST's was reviewed and found to be  positive for Klebsiella which she had a month before.  Patient is currently on Levaquin.  Initially noted a red color to the urine that has now cleared with decrease in debris.  Patient was instructed to continue the Levaquin as per her urologist.  She does have a h/o TYLOR so her O2 sats will need to be watched closely. [FreeTextEntry7] : Stop supplements and any multivitamins or vitamin E.  Okay to take her B complex and vitamin D.  Continue with famotidine.

## 2021-09-09 NOTE — HISTORY OF PRESENT ILLNESS
[Coronary Artery Disease] : coronary artery disease [COPD] : COPD [Sleep Apnea] : sleep apnea [Family Member] : no family member with adverse anesthesia reaction/sudden death [Self] : no previous adverse anesthesia reaction [Chronic Kidney Disease] : no chronic kidney disease [Diabetes] : no diabetes [FreeTextEntry1] : Kidney stone extraction [FreeTextEntry2] : 9/17/20 [FreeTextEntry3] : HALEY sigala [FreeTextEntry4] : 76-year-old female with a history of CAD, status post MI and stents on prasugrel, ranolizine and ASA here for presurgical evaluation for removal of a renal stone.  Currently is obstructed with a nephrostomy tube in place on the left.  Was told that she has a UTI and was started on levaquin.  Urine had looked red and was turbid, it is now clearing.  Today is day 2 of the antibiotics.  Culture reviewed and she has Klebsiella sensitive to levaquin.  C/O low, right-sided back pain today. [FreeTextEntry6] : Baseline NAVARRO, no recent chest pain (1 episode relieved with sitting up with a h/o GERD). [Other: _____] : [unfilled]

## 2021-09-09 NOTE — REVIEW OF SYSTEMS
[Palpitations] : no palpitations [Lower Ext Edema] : lower extremity edema [Frequency] : frequency [Back Pain] : back pain [Negative] : Neurological [FreeTextEntry5] : Sleeps upright [FreeTextEntry6] : Baseline NAVARRO [de-identified] : Few bruises

## 2021-09-13 PROBLEM — R06.83 SNORING: Chronic | Status: ACTIVE | Noted: 2021-09-03

## 2021-09-13 PROBLEM — Z86.69 PERSONAL HISTORY OF OTHER DISEASES OF THE NERVOUS SYSTEM AND SENSE ORGANS: Chronic | Status: ACTIVE | Noted: 2021-09-03

## 2021-09-13 PROBLEM — M51.26 OTHER INTERVERTEBRAL DISC DISPLACEMENT, LUMBAR REGION: Chronic | Status: ACTIVE | Noted: 2021-09-03

## 2021-09-13 PROBLEM — Z86.79 PERSONAL HISTORY OF OTHER DISEASES OF THE CIRCULATORY SYSTEM: Chronic | Status: ACTIVE | Noted: 2021-09-03

## 2021-09-13 PROBLEM — N20.0 CALCULUS OF KIDNEY: Chronic | Status: ACTIVE | Noted: 2021-09-03

## 2021-09-13 PROBLEM — Z87.39 PERSONAL HISTORY OF OTHER DISEASES OF THE MUSCULOSKELETAL SYSTEM AND CONNECTIVE TISSUE: Chronic | Status: ACTIVE | Noted: 2021-09-03

## 2021-09-14 ENCOUNTER — OUTPATIENT (OUTPATIENT)
Dept: OUTPATIENT SERVICES | Facility: HOSPITAL | Age: 76
LOS: 1 days | End: 2021-09-14
Payer: MEDICARE

## 2021-09-14 DIAGNOSIS — Z98.49 CATARACT EXTRACTION STATUS, UNSPECIFIED EYE: Chronic | ICD-10-CM

## 2021-09-14 DIAGNOSIS — Z98.890 OTHER SPECIFIED POSTPROCEDURAL STATES: Chronic | ICD-10-CM

## 2021-09-14 DIAGNOSIS — Z90.49 ACQUIRED ABSENCE OF OTHER SPECIFIED PARTS OF DIGESTIVE TRACT: Chronic | ICD-10-CM

## 2021-09-14 DIAGNOSIS — Z93.6 OTHER ARTIFICIAL OPENINGS OF URINARY TRACT STATUS: Chronic | ICD-10-CM

## 2021-09-14 DIAGNOSIS — Z90.710 ACQUIRED ABSENCE OF BOTH CERVIX AND UTERUS: Chronic | ICD-10-CM

## 2021-09-14 DIAGNOSIS — Z11.52 ENCOUNTER FOR SCREENING FOR COVID-19: ICD-10-CM

## 2021-09-14 DIAGNOSIS — Z95.1 PRESENCE OF AORTOCORONARY BYPASS GRAFT: Chronic | ICD-10-CM

## 2021-09-14 DIAGNOSIS — N81.4 UTEROVAGINAL PROLAPSE, UNSPECIFIED: Chronic | ICD-10-CM

## 2021-09-14 LAB — SARS-COV-2 RNA SPEC QL NAA+PROBE: SIGNIFICANT CHANGE UP

## 2021-09-16 ENCOUNTER — TRANSCRIPTION ENCOUNTER (OUTPATIENT)
Age: 76
End: 2021-09-16

## 2021-09-17 ENCOUNTER — RESULT REVIEW (OUTPATIENT)
Age: 76
End: 2021-09-17

## 2021-09-17 ENCOUNTER — OUTPATIENT (OUTPATIENT)
Dept: INPATIENT UNIT | Facility: HOSPITAL | Age: 76
LOS: 1 days | End: 2021-09-17
Payer: MEDICARE

## 2021-09-17 VITALS
DIASTOLIC BLOOD PRESSURE: 72 MMHG | HEART RATE: 70 BPM | WEIGHT: 259.04 LBS | OXYGEN SATURATION: 97 % | SYSTOLIC BLOOD PRESSURE: 155 MMHG | HEIGHT: 63 IN | TEMPERATURE: 98 F | RESPIRATION RATE: 16 BRPM

## 2021-09-17 DIAGNOSIS — Z98.890 OTHER SPECIFIED POSTPROCEDURAL STATES: Chronic | ICD-10-CM

## 2021-09-17 DIAGNOSIS — Z98.49 CATARACT EXTRACTION STATUS, UNSPECIFIED EYE: Chronic | ICD-10-CM

## 2021-09-17 DIAGNOSIS — N20.0 CALCULUS OF KIDNEY: ICD-10-CM

## 2021-09-17 DIAGNOSIS — Z90.710 ACQUIRED ABSENCE OF BOTH CERVIX AND UTERUS: Chronic | ICD-10-CM

## 2021-09-17 DIAGNOSIS — Z90.49 ACQUIRED ABSENCE OF OTHER SPECIFIED PARTS OF DIGESTIVE TRACT: Chronic | ICD-10-CM

## 2021-09-17 DIAGNOSIS — N81.4 UTEROVAGINAL PROLAPSE, UNSPECIFIED: Chronic | ICD-10-CM

## 2021-09-17 DIAGNOSIS — Z95.1 PRESENCE OF AORTOCORONARY BYPASS GRAFT: Chronic | ICD-10-CM

## 2021-09-17 DIAGNOSIS — Z93.6 OTHER ARTIFICIAL OPENINGS OF URINARY TRACT STATUS: Chronic | ICD-10-CM

## 2021-09-17 LAB
ANION GAP SERPL CALC-SCNC: 13 MMOL/L — SIGNIFICANT CHANGE UP (ref 5–17)
BASOPHILS # BLD AUTO: 0.02 K/UL — SIGNIFICANT CHANGE UP (ref 0–0.2)
BASOPHILS NFR BLD AUTO: 0.3 % — SIGNIFICANT CHANGE UP (ref 0–2)
BUN SERPL-MCNC: 20 MG/DL — SIGNIFICANT CHANGE UP (ref 7–23)
CALCIUM SERPL-MCNC: 8.7 MG/DL — SIGNIFICANT CHANGE UP (ref 8.4–10.5)
CHLORIDE SERPL-SCNC: 103 MMOL/L — SIGNIFICANT CHANGE UP (ref 96–108)
CO2 SERPL-SCNC: 23 MMOL/L — SIGNIFICANT CHANGE UP (ref 22–31)
CREAT SERPL-MCNC: 0.79 MG/DL — SIGNIFICANT CHANGE UP (ref 0.5–1.3)
EOSINOPHIL # BLD AUTO: 0.12 K/UL — SIGNIFICANT CHANGE UP (ref 0–0.5)
EOSINOPHIL NFR BLD AUTO: 2.1 % — SIGNIFICANT CHANGE UP (ref 0–6)
GLUCOSE SERPL-MCNC: 113 MG/DL — HIGH (ref 70–99)
HCT VFR BLD CALC: 33.2 % — LOW (ref 34.5–45)
HGB BLD-MCNC: 10.7 G/DL — LOW (ref 11.5–15.5)
IMM GRANULOCYTES NFR BLD AUTO: 0.3 % — SIGNIFICANT CHANGE UP (ref 0–1.5)
LYMPHOCYTES # BLD AUTO: 1.24 K/UL — SIGNIFICANT CHANGE UP (ref 1–3.3)
LYMPHOCYTES # BLD AUTO: 21.6 % — SIGNIFICANT CHANGE UP (ref 13–44)
MCHC RBC-ENTMCNC: 30.8 PG — SIGNIFICANT CHANGE UP (ref 27–34)
MCHC RBC-ENTMCNC: 32.2 GM/DL — SIGNIFICANT CHANGE UP (ref 32–36)
MCV RBC AUTO: 95.7 FL — SIGNIFICANT CHANGE UP (ref 80–100)
MONOCYTES # BLD AUTO: 0.45 K/UL — SIGNIFICANT CHANGE UP (ref 0–0.9)
MONOCYTES NFR BLD AUTO: 7.8 % — SIGNIFICANT CHANGE UP (ref 2–14)
NEUTROPHILS # BLD AUTO: 3.9 K/UL — SIGNIFICANT CHANGE UP (ref 1.8–7.4)
NEUTROPHILS NFR BLD AUTO: 67.9 % — SIGNIFICANT CHANGE UP (ref 43–77)
NRBC # BLD: 0 /100 WBCS — SIGNIFICANT CHANGE UP (ref 0–0)
PLATELET # BLD AUTO: 236 K/UL — SIGNIFICANT CHANGE UP (ref 150–400)
POTASSIUM SERPL-MCNC: 3.9 MMOL/L — SIGNIFICANT CHANGE UP (ref 3.5–5.3)
POTASSIUM SERPL-SCNC: 3.9 MMOL/L — SIGNIFICANT CHANGE UP (ref 3.5–5.3)
RBC # BLD: 3.47 M/UL — LOW (ref 3.8–5.2)
RBC # FLD: 15 % — HIGH (ref 10.3–14.5)
SODIUM SERPL-SCNC: 139 MMOL/L — SIGNIFICANT CHANGE UP (ref 135–145)
WBC # BLD: 5.75 K/UL — SIGNIFICANT CHANGE UP (ref 3.8–10.5)
WBC # FLD AUTO: 5.75 K/UL — SIGNIFICANT CHANGE UP (ref 3.8–10.5)

## 2021-09-17 PROCEDURE — 76000 FLUOROSCOPY <1 HR PHYS/QHP: CPT

## 2021-09-17 PROCEDURE — U0003: CPT

## 2021-09-17 PROCEDURE — 88300 SURGICAL PATH GROSS: CPT | Mod: 26

## 2021-09-17 PROCEDURE — U0005: CPT

## 2021-09-17 PROCEDURE — C9803: CPT

## 2021-09-17 RX ORDER — OXYCODONE HYDROCHLORIDE 5 MG/1
10 TABLET ORAL EVERY 4 HOURS
Refills: 0 | Status: DISCONTINUED | OUTPATIENT
Start: 2021-09-17 | End: 2021-09-17

## 2021-09-17 RX ORDER — SENNA PLUS 8.6 MG/1
2 TABLET ORAL AT BEDTIME
Refills: 0 | Status: DISCONTINUED | OUTPATIENT
Start: 2021-09-17 | End: 2021-10-01

## 2021-09-17 RX ORDER — ACETAMINOPHEN 500 MG
650 TABLET ORAL EVERY 6 HOURS
Refills: 0 | Status: DISCONTINUED | OUTPATIENT
Start: 2021-09-17 | End: 2021-10-01

## 2021-09-17 RX ORDER — LIDOCAINE HCL 20 MG/ML
0.2 VIAL (ML) INJECTION ONCE
Refills: 0 | Status: DISCONTINUED | OUTPATIENT
Start: 2021-09-17 | End: 2021-09-17

## 2021-09-17 RX ORDER — FENTANYL CITRATE 50 UG/ML
25 INJECTION INTRAVENOUS
Refills: 0 | Status: DISCONTINUED | OUTPATIENT
Start: 2021-09-17 | End: 2021-09-18

## 2021-09-17 RX ORDER — HEPARIN SODIUM 5000 [USP'U]/ML
5000 INJECTION INTRAVENOUS; SUBCUTANEOUS EVERY 8 HOURS
Refills: 0 | Status: DISCONTINUED | OUTPATIENT
Start: 2021-09-17 | End: 2021-10-01

## 2021-09-17 RX ORDER — PIPERACILLIN AND TAZOBACTAM 4; .5 G/20ML; G/20ML
3.38 INJECTION, POWDER, LYOPHILIZED, FOR SOLUTION INTRAVENOUS EVERY 8 HOURS
Refills: 0 | Status: DISCONTINUED | OUTPATIENT
Start: 2021-09-17 | End: 2021-10-01

## 2021-09-17 RX ORDER — ONDANSETRON 8 MG/1
4 TABLET, FILM COATED ORAL ONCE
Refills: 0 | Status: DISCONTINUED | OUTPATIENT
Start: 2021-09-17 | End: 2021-09-18

## 2021-09-17 RX ORDER — ASPIRIN/CALCIUM CARB/MAGNESIUM 324 MG
81 TABLET ORAL DAILY
Refills: 0 | Status: DISCONTINUED | OUTPATIENT
Start: 2021-09-17 | End: 2021-10-01

## 2021-09-17 RX ORDER — SODIUM CHLORIDE 9 MG/ML
3 INJECTION INTRAMUSCULAR; INTRAVENOUS; SUBCUTANEOUS EVERY 8 HOURS
Refills: 0 | Status: DISCONTINUED | OUTPATIENT
Start: 2021-09-17 | End: 2021-09-17

## 2021-09-17 RX ORDER — LIDOCAINE 4 G/100G
1 CREAM TOPICAL DAILY
Refills: 0 | Status: DISCONTINUED | OUTPATIENT
Start: 2021-09-17 | End: 2021-10-01

## 2021-09-17 RX ORDER — RANOLAZINE 500 MG/1
500 TABLET, FILM COATED, EXTENDED RELEASE ORAL
Refills: 0 | Status: DISCONTINUED | OUTPATIENT
Start: 2021-09-17 | End: 2021-10-01

## 2021-09-17 RX ORDER — METOPROLOL TARTRATE 50 MG
100 TABLET ORAL DAILY
Refills: 0 | Status: DISCONTINUED | OUTPATIENT
Start: 2021-09-17 | End: 2021-10-01

## 2021-09-17 RX ORDER — SODIUM CHLORIDE 9 MG/ML
1000 INJECTION, SOLUTION INTRAVENOUS
Refills: 0 | Status: DISCONTINUED | OUTPATIENT
Start: 2021-09-17 | End: 2021-10-01

## 2021-09-17 RX ORDER — OXYCODONE HYDROCHLORIDE 5 MG/1
5 TABLET ORAL EVERY 4 HOURS
Refills: 0 | Status: DISCONTINUED | OUTPATIENT
Start: 2021-09-17 | End: 2021-09-18

## 2021-09-17 RX ORDER — CHLORHEXIDINE GLUCONATE 213 G/1000ML
1 SOLUTION TOPICAL ONCE
Refills: 0 | Status: DISCONTINUED | OUTPATIENT
Start: 2021-09-17 | End: 2021-09-17

## 2021-09-17 RX ORDER — ATORVASTATIN CALCIUM 80 MG/1
80 TABLET, FILM COATED ORAL AT BEDTIME
Refills: 0 | Status: DISCONTINUED | OUTPATIENT
Start: 2021-09-17 | End: 2021-10-01

## 2021-09-17 RX ORDER — FUROSEMIDE 40 MG
20 TABLET ORAL DAILY
Refills: 0 | Status: DISCONTINUED | OUTPATIENT
Start: 2021-09-17 | End: 2021-10-01

## 2021-09-17 RX ORDER — FAMOTIDINE 10 MG/ML
40 INJECTION INTRAVENOUS
Refills: 0 | Status: DISCONTINUED | OUTPATIENT
Start: 2021-09-17 | End: 2021-10-01

## 2021-09-17 RX ADMIN — SODIUM CHLORIDE 125 MILLILITER(S): 9 INJECTION, SOLUTION INTRAVENOUS at 20:38

## 2021-09-17 RX ADMIN — LIDOCAINE 1 PATCH: 4 CREAM TOPICAL at 17:27

## 2021-09-17 RX ADMIN — SODIUM CHLORIDE 125 MILLILITER(S): 9 INJECTION, SOLUTION INTRAVENOUS at 12:28

## 2021-09-17 RX ADMIN — FAMOTIDINE 40 MILLIGRAM(S): 10 INJECTION INTRAVENOUS at 17:26

## 2021-09-17 RX ADMIN — Medication 650 MILLIGRAM(S): at 20:37

## 2021-09-17 RX ADMIN — OXYCODONE HYDROCHLORIDE 10 MILLIGRAM(S): 5 TABLET ORAL at 14:00

## 2021-09-17 RX ADMIN — FENTANYL CITRATE 25 MICROGRAM(S): 50 INJECTION INTRAVENOUS at 12:22

## 2021-09-17 RX ADMIN — FENTANYL CITRATE 25 MICROGRAM(S): 50 INJECTION INTRAVENOUS at 12:30

## 2021-09-17 RX ADMIN — Medication 650 MILLIGRAM(S): at 21:00

## 2021-09-17 RX ADMIN — HEPARIN SODIUM 5000 UNIT(S): 5000 INJECTION INTRAVENOUS; SUBCUTANEOUS at 14:45

## 2021-09-17 RX ADMIN — OXYCODONE HYDROCHLORIDE 10 MILLIGRAM(S): 5 TABLET ORAL at 17:33

## 2021-09-17 RX ADMIN — PIPERACILLIN AND TAZOBACTAM 25 GRAM(S): 4; .5 INJECTION, POWDER, LYOPHILIZED, FOR SOLUTION INTRAVENOUS at 21:14

## 2021-09-17 RX ADMIN — ATORVASTATIN CALCIUM 80 MILLIGRAM(S): 80 TABLET, FILM COATED ORAL at 21:14

## 2021-09-17 RX ADMIN — RANOLAZINE 500 MILLIGRAM(S): 500 TABLET, FILM COATED, EXTENDED RELEASE ORAL at 17:26

## 2021-09-17 RX ADMIN — OXYCODONE HYDROCHLORIDE 10 MILLIGRAM(S): 5 TABLET ORAL at 18:00

## 2021-09-17 RX ADMIN — LIDOCAINE 1 PATCH: 4 CREAM TOPICAL at 19:38

## 2021-09-17 RX ADMIN — OXYCODONE HYDROCHLORIDE 5 MILLIGRAM(S): 5 TABLET ORAL at 13:11

## 2021-09-17 RX ADMIN — PIPERACILLIN AND TAZOBACTAM 25 GRAM(S): 4; .5 INJECTION, POWDER, LYOPHILIZED, FOR SOLUTION INTRAVENOUS at 14:30

## 2021-09-17 RX ADMIN — HEPARIN SODIUM 5000 UNIT(S): 5000 INJECTION INTRAVENOUS; SUBCUTANEOUS at 21:15

## 2021-09-17 RX ADMIN — OXYCODONE HYDROCHLORIDE 10 MILLIGRAM(S): 5 TABLET ORAL at 23:20

## 2021-09-17 RX ADMIN — Medication 100 MILLIGRAM(S): at 17:27

## 2021-09-17 NOTE — ASU PATIENT PROFILE, ADULT - NSICDXPASTMEDICALHX_GEN_ALL_CORE_FT
PAST MEDICAL HISTORY:  Bulging lumbar disc     CAD (coronary artery disease)     Calculus of kidney     Cataract     Chronic bronchitis     Emphysema lung mild, on CT scan 2017    H/O heart failure last admission 2019  EF 50% (4/2021)    History of sciatica     History of spinal stenosis dx 2008    History of unstable angina last admission 4/27/2021 at [facility]   prompted left cardiac cath with coronary artery stenting (LAD)   on prasugrel   and on aspirin   last nitro since april 2021  cath done by Dr. Dwight Metcalf    HTN (hypertension) diagnosed 10/2017    Lumbar herniated disc     Malignant neoplasm of cervix, unspecified site s/p ROBERTO 1978    Morbid obesity with BMI of 40.0-44.9, adult     TYLOR (obstructive sleep apnea) as per [facility] criteria    Proteinuria     Smoker 1/2 pack X 55 years    Snores sleep study 6/2021, no result    Spinal stenosis of lumbar region

## 2021-09-17 NOTE — ASU PATIENT PROFILE, ADULT - NSICDXPASTSURGICALHX_GEN_ALL_CORE_FT
PAST SURGICAL HISTORY:  H/O abdominal hysterectomy ROBERTO 1978  cervical cancer   no radiation no chemo    H/O lithotripsy ESWL 1986 left, 2004 right, 2007 left    History of appendectomy 1954    Nephrostomy status left nephrostomy tube placement 8/5/2021    Prolapse, uterovaginal s/p TVT Sling 2000    S/P breast lumpectomy left, benign    S/P CABG x 4 5/31/2019    S/P cataract surgery bilateral, 2015    S/P cholecystectomy 2005    S/P colonoscopy with polypectomy     S/P ERCP 2006

## 2021-09-17 NOTE — BRIEF OPERATIVE NOTE - NSICDXBRIEFPROCEDURE_GEN_ALL_CORE_FT
PROCEDURES:  Cystoscopy with percutaneous nephrolithotomy, left 17-Sep-2021 11:49:45 left ureteroscopy Viky Galvez

## 2021-09-18 VITALS
TEMPERATURE: 97 F | HEART RATE: 68 BPM | DIASTOLIC BLOOD PRESSURE: 68 MMHG | SYSTOLIC BLOOD PRESSURE: 104 MMHG | RESPIRATION RATE: 20 BRPM | OXYGEN SATURATION: 99 %

## 2021-09-18 LAB
ANION GAP SERPL CALC-SCNC: 8 MMOL/L — SIGNIFICANT CHANGE UP (ref 5–17)
BUN SERPL-MCNC: 12 MG/DL — SIGNIFICANT CHANGE UP (ref 7–23)
CALCIUM SERPL-MCNC: 8.6 MG/DL — SIGNIFICANT CHANGE UP (ref 8.4–10.5)
CHLORIDE SERPL-SCNC: 101 MMOL/L — SIGNIFICANT CHANGE UP (ref 96–108)
CO2 SERPL-SCNC: 27 MMOL/L — SIGNIFICANT CHANGE UP (ref 22–31)
CREAT SERPL-MCNC: 0.77 MG/DL — SIGNIFICANT CHANGE UP (ref 0.5–1.3)
GLUCOSE SERPL-MCNC: 109 MG/DL — HIGH (ref 70–99)
HCT VFR BLD CALC: 35.7 % — SIGNIFICANT CHANGE UP (ref 34.5–45)
HGB BLD-MCNC: 11.2 G/DL — LOW (ref 11.5–15.5)
MCHC RBC-ENTMCNC: 31.4 GM/DL — LOW (ref 32–36)
MCHC RBC-ENTMCNC: 31.4 PG — SIGNIFICANT CHANGE UP (ref 27–34)
MCV RBC AUTO: 100 FL — SIGNIFICANT CHANGE UP (ref 80–100)
NRBC # BLD: 0 /100 WBCS — SIGNIFICANT CHANGE UP (ref 0–0)
PLATELET # BLD AUTO: 249 K/UL — SIGNIFICANT CHANGE UP (ref 150–400)
POTASSIUM SERPL-MCNC: 4.1 MMOL/L — SIGNIFICANT CHANGE UP (ref 3.5–5.3)
POTASSIUM SERPL-SCNC: 4.1 MMOL/L — SIGNIFICANT CHANGE UP (ref 3.5–5.3)
RBC # BLD: 3.57 M/UL — LOW (ref 3.8–5.2)
RBC # FLD: 14.6 % — HIGH (ref 10.3–14.5)
SODIUM SERPL-SCNC: 136 MMOL/L — SIGNIFICANT CHANGE UP (ref 135–145)
WBC # BLD: 7.53 K/UL — SIGNIFICANT CHANGE UP (ref 3.8–10.5)
WBC # FLD AUTO: 7.53 K/UL — SIGNIFICANT CHANGE UP (ref 3.8–10.5)

## 2021-09-18 PROCEDURE — 88300 SURGICAL PATH GROSS: CPT

## 2021-09-18 PROCEDURE — C1769: CPT

## 2021-09-18 PROCEDURE — C1887: CPT

## 2021-09-18 PROCEDURE — C1726: CPT

## 2021-09-18 PROCEDURE — 52332 CYSTOSCOPY AND TREATMENT: CPT | Mod: LT

## 2021-09-18 PROCEDURE — C1889: CPT

## 2021-09-18 PROCEDURE — C1758: CPT

## 2021-09-18 PROCEDURE — C2617: CPT

## 2021-09-18 PROCEDURE — 85025 COMPLETE CBC W/AUTO DIFF WBC: CPT

## 2021-09-18 PROCEDURE — C9399: CPT

## 2021-09-18 PROCEDURE — 50432 PLMT NEPHROSTOMY CATHETER: CPT | Mod: LT

## 2021-09-18 PROCEDURE — 85027 COMPLETE CBC AUTOMATED: CPT

## 2021-09-18 PROCEDURE — 80048 BASIC METABOLIC PNL TOTAL CA: CPT

## 2021-09-18 PROCEDURE — 50080 PERQ NL/PL LITHOTRP SMPL<2CM: CPT | Mod: LT

## 2021-09-18 PROCEDURE — 82365 CALCULUS SPECTROSCOPY: CPT

## 2021-09-18 RX ORDER — PRASUGREL 5 MG/1
1 TABLET, FILM COATED ORAL
Qty: 0 | Refills: 0 | DISCHARGE

## 2021-09-18 RX ORDER — SENNA PLUS 8.6 MG/1
2 TABLET ORAL
Qty: 0 | Refills: 0 | DISCHARGE
Start: 2021-09-18

## 2021-09-18 RX ORDER — OXYCODONE HYDROCHLORIDE 5 MG/1
1 TABLET ORAL
Qty: 20 | Refills: 0
Start: 2021-09-18 | End: 2021-09-22

## 2021-09-18 RX ADMIN — PIPERACILLIN AND TAZOBACTAM 25 GRAM(S): 4; .5 INJECTION, POWDER, LYOPHILIZED, FOR SOLUTION INTRAVENOUS at 05:04

## 2021-09-18 RX ADMIN — Medication 100 MILLIGRAM(S): at 06:00

## 2021-09-18 RX ADMIN — OXYCODONE HYDROCHLORIDE 5 MILLIGRAM(S): 5 TABLET ORAL at 08:45

## 2021-09-18 RX ADMIN — Medication 81 MILLIGRAM(S): at 12:04

## 2021-09-18 RX ADMIN — HEPARIN SODIUM 5000 UNIT(S): 5000 INJECTION INTRAVENOUS; SUBCUTANEOUS at 06:00

## 2021-09-18 RX ADMIN — Medication 20 MILLIGRAM(S): at 06:00

## 2021-09-18 RX ADMIN — OXYCODONE HYDROCHLORIDE 10 MILLIGRAM(S): 5 TABLET ORAL at 00:00

## 2021-09-18 RX ADMIN — FAMOTIDINE 40 MILLIGRAM(S): 10 INJECTION INTRAVENOUS at 06:00

## 2021-09-18 RX ADMIN — LIDOCAINE 1 PATCH: 4 CREAM TOPICAL at 06:00

## 2021-09-18 RX ADMIN — SODIUM CHLORIDE 125 MILLILITER(S): 9 INJECTION, SOLUTION INTRAVENOUS at 04:23

## 2021-09-18 RX ADMIN — RANOLAZINE 500 MILLIGRAM(S): 500 TABLET, FILM COATED, EXTENDED RELEASE ORAL at 06:00

## 2021-09-18 NOTE — ASU DISCHARGE PLAN (ADULT/PEDIATRIC) - ASU DC SPECIAL INSTRUCTIONSFT
Please follow up with Dr. Broderick in 1 week.  Call [phone] to schedule/confirm your appointment.  Call or follow up sooner with fevers, chills, nausea, vomiting, increasing pain, or with other concerns.     Can restart Prasugrel on Monday. Please follow up with Dr. Broderick/ Goldberg next week.   Call [phone] to schedule/confirm your appointment.  Call or follow up sooner with fevers, chills, nausea, vomiting, increasing pain, or with other concerns.   Continue with nephrostomy tube open to drainage.   Can restart Prasugrel on Monday.

## 2021-09-18 NOTE — PROGRESS NOTE ADULT - SUBJECTIVE AND OBJECTIVE BOX
UROLOGY DAILY PROGRESS NOTE:     Subjective: Patient seen and examined at bedside. No overnight events.       Objective:  Vital signs  T(F): , Max: 98.1 (09-17-21 @ 08:20)  HR: 77 (09-18-21 @ 07:30)  BP: 134/59 (09-18-21 @ 07:30)  SpO2: 100% (09-18-21 @ 07:30)  Wt(kg): --    I&O's Summary    17 Sep 2021 07:01  -  18 Sep 2021 07:00  --------------------------------------------------------  IN: 3460 mL / OUT: 2365 mL / NET: 1095 mL    18 Sep 2021 07:01  -  18 Sep 2021 08:18  --------------------------------------------------------  IN: 150 mL / OUT: 0 mL / NET: 150 mL        Gen: NAD  Pulm: No respiratory distress, no subcostal retractions  CV: RRR, no JVD  Abd: Soft, NT, ND  Back: L NT draining punch colored urine   : Gamboa- clear urine    Labs:  09-18  7.53  / 35.7  /0.77   09-17  5.75  / 33.2  /0.79                           11.2   7.53  )-----------( 249      ( 18 Sep 2021 05:46 )             35.7     09-18    136  |  101  |  12  ----------------------------<  109<H>  4.1   |  27  |  0.77    Ca    8.6      18 Sep 2021 05:46          Urine Cx:  
 Post op Check    Pt seen and examined without complaints. Pain is controlled. Denies SOB/CP/N/V.     Vital Signs Last 24 Hrs  T(C): 36 (17 Sep 2021 11:58), Max: 36.7 (17 Sep 2021 08:20)  T(F): 96.8 (17 Sep 2021 11:58), Max: 98.1 (17 Sep 2021 08:20)  HR: 63 (17 Sep 2021 15:00) (61 - 70)  BP: 172/72 (17 Sep 2021 15:00) (143/96 - 172/79)  BP(mean): 104 (17 Sep 2021 15:00) (104 - 115)  RR: 18 (17 Sep 2021 15:00) (15 - 18)  SpO2: 100% (17 Sep 2021 15:00) (97% - 100%)    I&O's Summary    17 Sep 2021 07:01  -  17 Sep 2021 15:12  --------------------------------------------------------  IN: 575 mL / OUT: 145 mL / NET: 430 mL        Physical Exam  Gen: NAD  Pulm: No respiratory distress, no subcostal retractions  CV: RRR, no JVD  Abd: Soft, NT, ND  Back: L NT draining punch urine  :  Gamboa draining clear yellow urine                           10.7   5.75  )-----------( 236      ( 17 Sep 2021 12:43 )             33.2       09-17    139  |  103  |  20  ----------------------------<  113<H>  3.9   |  23  |  0.79    Ca    8.7      17 Sep 2021 12:43        A/P: 76y Female s/p L PCNL   DVT prophylaxis/OOB  Incentive spirometry  Strict I&O's  Analgesia and antiemetics as needed  Diet  AM labs  AM TOV  Clamping trial in am, if passes--> DC NT and dc home     
patient stable post op with n/v with nt clamped  now resolved with nt to drainage  vss   afeb  no cvat  urine light blood tinged via nt

## 2021-09-18 NOTE — PROGRESS NOTE ADULT - ASSESSMENT
discharge instructions given  nt to sd  outpatient [facility] and management per dr sigala
A/P: 76y Female s/p L PCNL POD1   DVT prophylaxis/OOB  Incentive spirometry  Strict I&O's  Analgesia and antiemetics as needed  Diet  TOV today   Clamping trial today--> dc NT

## 2021-09-24 LAB — NIDUS STONE QN: SIGNIFICANT CHANGE UP

## 2021-10-29 ENCOUNTER — APPOINTMENT (OUTPATIENT)
Age: 76
End: 2021-10-29
Payer: MEDICARE

## 2021-10-29 ENCOUNTER — OUTPATIENT (OUTPATIENT)
Dept: OUTPATIENT SERVICES | Facility: HOSPITAL | Age: 76
LOS: 1 days | End: 2021-10-29
Payer: MEDICARE

## 2021-10-29 DIAGNOSIS — Z98.890 OTHER SPECIFIED POSTPROCEDURAL STATES: Chronic | ICD-10-CM

## 2021-10-29 DIAGNOSIS — Z00.8 ENCOUNTER FOR OTHER GENERAL EXAMINATION: ICD-10-CM

## 2021-10-29 DIAGNOSIS — Z90.49 ACQUIRED ABSENCE OF OTHER SPECIFIED PARTS OF DIGESTIVE TRACT: Chronic | ICD-10-CM

## 2021-10-29 DIAGNOSIS — Z95.1 PRESENCE OF AORTOCORONARY BYPASS GRAFT: Chronic | ICD-10-CM

## 2021-10-29 DIAGNOSIS — Z90.710 ACQUIRED ABSENCE OF BOTH CERVIX AND UTERUS: Chronic | ICD-10-CM

## 2021-10-29 DIAGNOSIS — Z98.49 CATARACT EXTRACTION STATUS, UNSPECIFIED EYE: Chronic | ICD-10-CM

## 2021-10-29 DIAGNOSIS — Z93.6 OTHER ARTIFICIAL OPENINGS OF URINARY TRACT STATUS: Chronic | ICD-10-CM

## 2021-10-29 DIAGNOSIS — N81.4 UTEROVAGINAL PROLAPSE, UNSPECIFIED: Chronic | ICD-10-CM

## 2021-10-29 PROCEDURE — 74018 RADEX ABDOMEN 1 VIEW: CPT

## 2021-10-29 PROCEDURE — 74018 RADEX ABDOMEN 1 VIEW: CPT | Mod: 26

## 2021-11-04 NOTE — ED ADULT NURSE NOTE - CAS DISCH TRANSFER METHOD
Student's Name:   Aicha Us Birth Date  2019  Sex  female  Race/Ethnicity   School/Grade Level/ID#     Address:   [address] [address]  Parent/Guardian             Telephone#                                            Home:                               Work:   IMMUNIZATIONS: To be completed by health care provider. The mo/da/yr for every dose administered is required. If a specific vaccine is medically contraindicated, a separate written statement must be attached by the health care responsible for completing the health examination explaining the medical reason for the contraindication.      Immunization History   Administered Date(s) Administered   • DTaP(INFANRIX) 11/06/2020   • Dtap, Unspecified Formulation 02/06/2019, 06/20/2019, 01/07/2020   • HIB, Unspecified Formulation 01/18/2019, 02/06/2019, 08/26/2019   • Hep A, ped/adol, 2 dose 06/22/2020, 04/03/2021   • Hep B, adolescent or pediatric 2018, 2018, 08/26/2019   • Hib (PRP-OMP) 11/06/2020   • Influenza, injectable, quadrivalent, preservative-free 11/06/2020, 01/05/2021, 11/04/2021   • MMR 12/12/2019   • Pneumococcal Conjugate 13 Valent Vacc (Prevnar 13) 08/26/2019, 01/07/2020, 06/22/2020   • Polio, Unspecified Formulation 01/18/2019, 03/22/2019, 06/29/2019   • Rotavirus, Unspecified Formulation 01/18/2019, 03/22/2019, 06/20/2019   • Varicella 12/12/2019      Immunizations given 11/4/2021: influenza      Health care provider (MD, DO, APN, PA, school health professional, health official) verifying above immunization history must sign below. If adding dates to the above immunization history section, put your initials by date(s) and sign here.)  Signature                           Title                                                Date 11/4/2021  _____________________________________________________________________________________  Signature                                                                 Title                                                 Date  _____________________________________________________________________________________   ALTERNATIVE PROOF OF IMMUNITY   1. Clinical diagnosis (measles, mumps, hepatitis B) is allowed when verified by physician and supported with lab confirmation.  Attach copy of lab result.    * MEASLES (Rubeola)  MO   DA  YR          **MUMPS  MO   DA  YR             HEPATITIS B  MO   DA   YR           VARICELLA  MO   DA  YR      2. History of varicella (chickenpox) disease is acceptable if verified by health care provider, school health professional or health official.  Person signing below verifies that the parent/guardian’s description of varicella disease history is indicative of past infection and is accepting such history as documentation of disease.  Date of Disease                                  Signature                                                           Title   3. Laboratory Evidence of Immunity (check one)      __ Measles*         __ Mumps**        __ Rubella        __Varicella    (Attach copy of lab result)         *All measles cases diagnosed on or after July 1, 2002, must be confirmed by laboratory evidence.      **All mumps cases diagnosed on or after July 1, 2013, must be confirmed by laboratory evidence.     Completion of Alternative 1 or 3 MUST be accompanied by Labs & Physician Signature: ___________________________  Physician Statements of Immunity MUST be submitted to [facility] for review.     Certificates of [employer] Exemption to Immunizations or Physician Medical Statements of Medical Contraindication Are Reviewed   and Maintained by the School Authority     (11/2015)                                         (COMPLETE BOTH SIDES)                                  Approved [facility] 3/2016    HEALTH HISTORY      TO BE COMPLETED AND SIGNED BY PARENT/GUARDIAN AND VERIFIED BY HEALTH CARE PROVIDER  ALLERGIES: (Food, drug, insect, other) has No Known Allergies.    MEDICATION: (List all prescribed or taken on a regular basis.) has a current medication list which includes the following prescription(s): fluticasone (FLONASE) 50 MCG/ACT nasal spray and Acetaminophen (TYLENOL CHILDRENS PO).   Diagnosis of asthma?  Child wakes during night coughing? Yes    No  Yes    No  Loss of function of one of paired  organs?(eye/ear/kidney/testicle) Yes    No    Birth defects? Yes    No  Hospitalizations? Yes    No    Developmental delay? Yes    No   When? What for? Yes    No    Blood disorders? Hemophilia,  Sickle Cell, Other? Explain. Yes    No  Surgery? (List all.)  When? What for? Yes    No    Diabetes? Yes    No  Serious injury or illness? Yes    No    Head injury/Concussion/Passed out? Yes    No  TB skin test positive (past/present)? * Yes    No *If yes, refer to local [facility] dept   Seizures? What are they like?  Yes    No  TB disease (past or present)? * Yes    No *If yes, refer to local [facility] dept   Heart problem/Shortness of breath?  Yes    No  Tobacco use (type, frequency)?  Yes    No    Heart murmur/High blood pressure? Yes    No  Alcohol/Drug use?  Yes    No    Dizziness or chest pain with exercise? Yes    No  Family history of sudden death  before age 50? (Cause?) Yes    No    Eye/Vision problems? ______           __Glasses          __Contacts  Last exam by eye doctor ______  Other concerns? (crossed eye, drooping lids, squinting, difficulty reading) Dental?   __ Braces     __ Bridge     __ Plate   __Other   Ear/Hearing problems? Yes    No  Information may be shared with appropriate personnel for health and educational purposes.   Bone/Joint problem/injury/scoliosis? Yes    No  Parent/Guardian  Signature                                               Date   PHYSICAL EXAMINATION REQUIREMENTS   Entire section below to be completed by MD/DO/APN/PA Head Circumference if <2-3 years old - Temp 97.4 °F (36.3 °C) (Temporal)   Ht 3' 2.19\" (0.97 m)   Wt 15 kg (33 lb 1.6 oz)   BMI 15.96  kg/m²   BSA 0.63 m²    58 %ile (Z= 0.20) based on CDC (Girls, 2-20 Years) BMI-for-age based on BMI available as of 11/4/2021.   DIABETES SCREENING (NOT REQUIRED FOR ) BMI>85% age/sex: No     And any two of the following: Family History: No  Ethnic Minority: No    Signs of Insulin Resistance (hypertension, dyslipidemia, polycystic ovarian syndrome, acanthosis nigricans): No  At Risk: No   LEAD RISK QUESTIONNAIRE Required for children age 6 months through 6 years enrolled in licensed or public school operated day care, , nursery school and/or . (Blood test required if resides in [place] or high risk zip code.)  Questionnaire Administered? No  Blood Test Indicated? No   Blood Test Date/Result: 2.7 (11/6/2020).   TB SKIN OR BLOOD TEST Recommended only for children in high-risk groups including children immunosuppressed due to HIV infection or other conditions, frequent travel to or born in high prevalence countries or those exposed to adults in high-risk categories. See CDC guidelines. http://www.cdc.gov/tb/publications/factsheets/testing/TB_testing.htm.  Test Needed: No     Test performed: No                          Skin Test:    Date Read              /      /             Result:   Positive__      Negative__        mm________                          Blood Test: Date Reported       /      /               Result:  Positive__      Negative__      Value________  LAB TESTS (recommended) Date/Result  Date/Results   Hemoglobin or Hematocrit 12.4 (11/4/2021) Sickle Cell (when indicated)    Urinalysis NA Developmental Screening Tool Normal - ASQ        SYSTEM REVIEW Normal  Comments/Follow-up/Needs  Normal Comments/Follow-up/Needs   Skin  Yes  Endocrine Yes    Ears Yes                  Screening Result Gastrointestinal Yes    Eyes Yes                  Screening Result: Genito-Urinary Yes                                      LMP:    Nose Yes  Neurologic Yes    Throat Yes  Musculoskeletal Yes     Mouth/Dental Yes  Spinal Exam Yes    Cardiovascular/HTN Yes  Nutritional status Yes    Respiratory Yes Diagnosis of Asthma: No   Mental Health Yes    Currently Prescribed Asthma Medication:        No  Quick-relief medication (e.g. Short Acting Beta Antagonist)        No  Controller medication (e.g. inhaled corticosteroid) Other     NEEDS/MODIFICATIONS required in the school setting: No restrictions DIETARY Needs/Restrictions: No restrictions   SPECIAL INSTRUCTIONS/DEVICES e.g. safety glasses, glass eye, chest protector for arrhythmia, pacemaker, prosthetic device, dental bridge, false teeth, athletic support/cup: No restrictions   MENTAL HEALTH/OTHER Is there anything else the school should know about this student?  If you would like to discuss this student’s health with school or school health personnel:  Not needed   EMERGENCY ACTION needed while at school due to child’s health condition (e.g. ,seizures, asthma, insect sting, food, peanut allergy, bleeding problem, diabetes, heart problem)?   No   On the basis of the examination on this day, I approve this child’s participation in                                (If No or Modified please attach explanation.)  PHYSICAL EDUCATION:  Yes                               [employer]   Yes   Print Name  Pepper Tenorio MD         Signature                                   Date: 11/4/2021   Address:  [address] [address]  [phone]         (Complete Both Sides)     Approved [facility] 3/2016   Private car

## 2021-11-05 ENCOUNTER — APPOINTMENT (OUTPATIENT)
Dept: INTERNAL MEDICINE | Facility: CLINIC | Age: 76
End: 2021-11-05
Payer: MEDICARE

## 2021-11-05 LAB — SURGICAL PATHOLOGY STUDY: SIGNIFICANT CHANGE UP

## 2021-11-05 PROCEDURE — 90662 IIV NO PRSV INCREASED AG IM: CPT

## 2021-11-05 PROCEDURE — G0008: CPT

## 2021-11-05 PROCEDURE — 99214 OFFICE O/P EST MOD 30 MIN: CPT | Mod: 25

## 2021-11-09 ENCOUNTER — OUTPATIENT (OUTPATIENT)
Dept: OUTPATIENT SERVICES | Facility: HOSPITAL | Age: 76
LOS: 1 days | End: 2021-11-09
Payer: MEDICARE

## 2021-11-09 VITALS
HEIGHT: 63 IN | RESPIRATION RATE: 18 BRPM | DIASTOLIC BLOOD PRESSURE: 84 MMHG | HEART RATE: 74 BPM | WEIGHT: 261.03 LBS | OXYGEN SATURATION: 96 % | SYSTOLIC BLOOD PRESSURE: 130 MMHG | TEMPERATURE: 98 F

## 2021-11-09 DIAGNOSIS — N20.0 CALCULUS OF KIDNEY: ICD-10-CM

## 2021-11-09 DIAGNOSIS — Z90.49 ACQUIRED ABSENCE OF OTHER SPECIFIED PARTS OF DIGESTIVE TRACT: Chronic | ICD-10-CM

## 2021-11-09 DIAGNOSIS — Z98.890 OTHER SPECIFIED POSTPROCEDURAL STATES: Chronic | ICD-10-CM

## 2021-11-09 DIAGNOSIS — N81.4 UTEROVAGINAL PROLAPSE, UNSPECIFIED: Chronic | ICD-10-CM

## 2021-11-09 DIAGNOSIS — Z90.710 ACQUIRED ABSENCE OF BOTH CERVIX AND UTERUS: Chronic | ICD-10-CM

## 2021-11-09 DIAGNOSIS — E66.01 MORBID (SEVERE) OBESITY DUE TO EXCESS CALORIES: ICD-10-CM

## 2021-11-09 DIAGNOSIS — G47.33 OBSTRUCTIVE SLEEP APNEA (ADULT) (PEDIATRIC): ICD-10-CM

## 2021-11-09 DIAGNOSIS — Z93.6 OTHER ARTIFICIAL OPENINGS OF URINARY TRACT STATUS: Chronic | ICD-10-CM

## 2021-11-09 DIAGNOSIS — I25.10 ATHEROSCLEROTIC HEART DISEASE OF NATIVE CORONARY ARTERY WITHOUT ANGINA PECTORIS: ICD-10-CM

## 2021-11-09 DIAGNOSIS — Z95.1 PRESENCE OF AORTOCORONARY BYPASS GRAFT: Chronic | ICD-10-CM

## 2021-11-09 DIAGNOSIS — Z98.49 CATARACT EXTRACTION STATUS, UNSPECIFIED EYE: Chronic | ICD-10-CM

## 2021-11-09 LAB
ANION GAP SERPL CALC-SCNC: 11 MMOL/L — SIGNIFICANT CHANGE UP (ref 5–17)
BUN SERPL-MCNC: 18 MG/DL — SIGNIFICANT CHANGE UP (ref 7–23)
CALCIUM SERPL-MCNC: 9.5 MG/DL — SIGNIFICANT CHANGE UP (ref 8.4–10.5)
CHLORIDE SERPL-SCNC: 102 MMOL/L — SIGNIFICANT CHANGE UP (ref 96–108)
CO2 SERPL-SCNC: 26 MMOL/L — SIGNIFICANT CHANGE UP (ref 22–31)
CREAT SERPL-MCNC: 0.85 MG/DL — SIGNIFICANT CHANGE UP (ref 0.5–1.3)
GLUCOSE SERPL-MCNC: 81 MG/DL — SIGNIFICANT CHANGE UP (ref 70–99)
HCT VFR BLD CALC: 36.7 % — SIGNIFICANT CHANGE UP (ref 34.5–45)
HGB BLD-MCNC: 11.5 G/DL — SIGNIFICANT CHANGE UP (ref 11.5–15.5)
MCHC RBC-ENTMCNC: 30.6 PG — SIGNIFICANT CHANGE UP (ref 27–34)
MCHC RBC-ENTMCNC: 31.3 GM/DL — LOW (ref 32–36)
MCV RBC AUTO: 97.6 FL — SIGNIFICANT CHANGE UP (ref 80–100)
NRBC # BLD: 0 /100 WBCS — SIGNIFICANT CHANGE UP (ref 0–0)
PLATELET # BLD AUTO: 338 K/UL — SIGNIFICANT CHANGE UP (ref 150–400)
POTASSIUM SERPL-MCNC: 4.5 MMOL/L — SIGNIFICANT CHANGE UP (ref 3.5–5.3)
POTASSIUM SERPL-SCNC: 4.5 MMOL/L — SIGNIFICANT CHANGE UP (ref 3.5–5.3)
RBC # BLD: 3.76 M/UL — LOW (ref 3.8–5.2)
RBC # FLD: 14.6 % — HIGH (ref 10.3–14.5)
SODIUM SERPL-SCNC: 139 MMOL/L — SIGNIFICANT CHANGE UP (ref 135–145)
WBC # BLD: 7.89 K/UL — SIGNIFICANT CHANGE UP (ref 3.8–10.5)
WBC # FLD AUTO: 7.89 K/UL — SIGNIFICANT CHANGE UP (ref 3.8–10.5)

## 2021-11-09 PROCEDURE — 36415 COLL VENOUS BLD VENIPUNCTURE: CPT

## 2021-11-09 PROCEDURE — 85027 COMPLETE CBC AUTOMATED: CPT

## 2021-11-09 PROCEDURE — 80048 BASIC METABOLIC PNL TOTAL CA: CPT

## 2021-11-09 PROCEDURE — 87086 URINE CULTURE/COLONY COUNT: CPT

## 2021-11-09 PROCEDURE — G0463: CPT

## 2021-11-09 RX ORDER — CILOSTAZOL 100 MG/1
1 TABLET ORAL
Qty: 0 | Refills: 0 | DISCHARGE

## 2021-11-09 RX ORDER — CEFAZOLIN SODIUM 1 G
2000 VIAL (EA) INJECTION ONCE
Refills: 0 | Status: DISCONTINUED | OUTPATIENT
Start: 2021-11-17 | End: 2021-12-01

## 2021-11-09 RX ORDER — PRASUGREL 5 MG/1
1 TABLET, FILM COATED ORAL
Qty: 0 | Refills: 0 | DISCHARGE

## 2021-11-09 NOTE — H&P PST ADULT - PROBLEM SELECTOR PLAN 4
Hx CABG (2018), CAD w/ stent placement (4/2021); on Effient. Patient instructed to hold Effient as of 11/9/21; will confirm with cardiology and get evaluation prior to procedure.

## 2021-11-09 NOTE — H&P PST ADULT - NSICDXFAMILYHX_GEN_ALL_CORE_FT
FAMILY HISTORY:  Mother  Still living? Unknown  Family history of cervical cancer, Age at diagnosis: Age Unknown  FH: diverticulitis, Age at diagnosis: Age Unknown    Sibling  Still living? Unknown  Family history of kidney stone, Age at diagnosis: Age Unknown

## 2021-11-09 NOTE — H&P PST ADULT - ATTEMPT TO OOB
Detail Level: Detailed
Quality 131: Pain Assessment And Follow-Up: Pain assessment using a standardized tool is documented as negative, no follow-up plan required
no

## 2021-11-09 NOTE — H&P PST ADULT - PROBLEM SELECTOR PLAN 1
Cystoscopy, left ureteroscopy, left stent removal, retrograde pyelogram on 11/17/21 with Dr. Dwight Broderick.  Pre-op instructions given. Questions answered.  COVID19 PCR on 11/14/21 at [facility].

## 2021-11-09 NOTE — H&P PST ADULT - NSICDXPASTSURGICALHX_GEN_ALL_CORE_FT
PAST SURGICAL HISTORY:  H/O abdominal hysterectomy ROBERTO 1978  cervical cancer   no radiation no chemo    H/O lithotripsy ESWL 1986 left, 2004 right, 2007 left    History of appendectomy 1954    Nephrostomy status percutaneous nephrolithotomy with nephrostomy tube for removal of right kidney stone (2017), left nephrostomy tube placement 8/5/2021    Prolapse, uterovaginal s/p TVT Sling 2000    S/P breast lumpectomy left, benign 1998    S/P CABG x 4 5/31/2019, Dr. Goncalves at [facility]    S/P cataract surgery bilateral, 2015    S/P cholecystectomy 2005    S/P colonoscopy with polypectomy     S/P ERCP 2006

## 2021-11-09 NOTE — H&P PST ADULT - NEUROLOGICAL
Daily protein intake :80-90grams  Daily fluid intake :  50-60oz  Current exercise walking  Urine:minimal with PD  Stool: normal  Supplements:taking (liquicil protein)     details… Alert & oriented; no sensory, motor or coordination deficits, normal reflexes

## 2021-11-09 NOTE — H&P PST ADULT - NSICDXPASTMEDICALHX_GEN_ALL_CORE_FT
PAST MEDICAL HISTORY:  Bulging lumbar disc     CAD (coronary artery disease) 1 stent (D1) - 4/21 - Dr. Metcalf    Calculus of kidney     Cataract     Chronic bronchitis     Emphysema lung mild, on CT scan 2017    H/O heart failure last admission 2019  EF 50% (4/2021)    History of blood transfusion     History of sciatica     History of spinal stenosis dx 2008    History of unstable angina last admission 4/27/2021 at [facility]   prompted left cardiac cath with coronary artery stenting (LAD)   on prasugrel   and on aspirin   last nitro since april 2021  cath done by Dr. Dwight Metcalf    HTN (hypertension) diagnosed 10/2017    Lumbar herniated disc     Malignant neoplasm of cervix, unspecified site s/p ROBERTO 1978    Morbid obesity with BMI of 40.0-44.9, adult     TLYOR (obstructive sleep apnea) as per [facility] criteria; Did not complete sleep study yet    Proteinuria     Smoker 1/2 pack X 55 years    Snores sleep study 6/2021, no result    Spinal stenosis of lumbar region

## 2021-11-09 NOTE — H&P PST ADULT - HISTORY OF PRESENT ILLNESS
76 year old female with h/o HF (last admission 5/2019) s/p CABGx4 (2019), CAD, unstable angina (last admission 4/28/2021 prompted cath w/ coronary artery stenting x1 -LAD at [facility]), HTN, hyperlipidemia, obesity, ?TYLOR, spinal stenosis, former smoker (55pk/yrs), mild emphysema, sciatica, cervical ca s/p ROBERTO, kidney stone (12/2017) s/p ureteroscopy and nephrostomy tube placement (now extracted).     presents to preadmission testing today for scheduled left percutaneous nephrolithotomy for calculus of kidney. Patient was admitted at [facility] on July 31- August 7 for flank/back pain, CT scan revealed obstructed kidney stone, complicated by UTI with + klebsiella pneumoniae. Patient was unable to pass stone, left nephrostomy placed and discharged home on antibiotic (last dose on 8/26/2021), now returning on 9/17/2021 for proposed surgery with Dr. Broderick. Patient denies any malaise, afebrile.   patient denies any s/s of covid-19 infection, nor any known exposure.    Scheduled for covid-19 PCR swab on 11/14/21 at [facility]. 76 year old female with h/o HF (last admission 5/2019) s/p CABGx4 (2019), CAD, unstable angina (last admission 4/28/2021 prompted cath w/ coronary artery stenting x1 -LAD at [facility]), HTN, hyperlipidemia, obesity, ?TYLOR, spinal stenosis, former smoker (55pk/yrs), mild emphysema, sciatica, cervical ca s/p ROBERTO, kidney stone (12/2017) s/p ureteroscopy and nephrostomy tube placement (now extracted).  Patient was admitted at [facility] on July 31- August 7 for flank/back pain, CT scan revealed obstructed kidney stone, complicated by UTI with + klebsiella pneumoniae. Patient was unable to pass stone, left nephrostomy placed and discharged home on antibiotic (last dose on 8/26/2021), s/p left percutaneous nephrolithotomy for calculus of kidney on 9/17/21. Reports when "nephrostomy tube was removed the ureteral stent was dislodged". Now returning for Cystoscopy, Left ureteroscopy, left stent removal, retrograde pyelogram on 11/17/21 with Dr. Dwight Broderick. Endorses urinary frequency, urgency, delayed bladder emptying, dark urine. Denies CVA tenderness, hematuria.    Patient denies previous Covid19 infection/exposure,recent travel,fevers,chills,cough,SOB,loss of sense of smell/taste.   Scheduled for covid-19 PCR swab on 11/14/21 at [facility].  Not vaccinated for COVID19.

## 2021-11-11 PROBLEM — Z92.89 PERSONAL HISTORY OF OTHER MEDICAL TREATMENT: Chronic | Status: ACTIVE | Noted: 2021-11-09

## 2021-11-11 PROBLEM — G47.33 OBSTRUCTIVE SLEEP APNEA (ADULT) (PEDIATRIC): Chronic | Status: ACTIVE | Noted: 2017-11-30

## 2021-11-11 PROBLEM — I25.10 ATHEROSCLEROTIC HEART DISEASE OF NATIVE CORONARY ARTERY WITHOUT ANGINA PECTORIS: Chronic | Status: ACTIVE | Noted: 2021-02-14

## 2021-11-11 LAB
CULTURE RESULTS: SIGNIFICANT CHANGE UP
SPECIMEN SOURCE: SIGNIFICANT CHANGE UP

## 2021-11-12 NOTE — HISTORY OF PRESENT ILLNESS
[Coronary Artery Disease] : coronary artery disease [COPD] : COPD [Sleep Apnea] : sleep apnea [____ METs%] : [unfilled] METs% [Other: _____] : [unfilled] [Family Member] : no family member with adverse anesthesia reaction/sudden death [Self] : no previous adverse anesthesia reaction [Chronic Kidney Disease] : no chronic kidney disease [Diabetes] : no diabetes [FreeTextEntry1] : Removal of ureteral stent [FreeTextEntry2] : 11/17/21 [FreeTextEntry4] : 76-year-old female with a history of CAD, status post MI and stents on prasugrel, ranolizine and ASA, mild emphysema, morbid obesity and obstructive sleep apnea here for presurgical evaluation for ureteral stent removal as it needs to be done under anesthesia as the end of the tube was likely pulled up when the nephrostomy tube is no longer visible at the ureterovesicular junction.  Underwent lithotripsy in September for a stone that was blocking her ureter and required placement of a nephrostomy tube.   [FreeTextEntry6] : Baseline NAVARRO, no recent chest pain (1 episode relieved with sitting up with a h/o GERD).

## 2021-11-12 NOTE — REVIEW OF SYSTEMS
[Lower Ext Edema] : lower extremity edema [Frequency] : frequency [Back Pain] : back pain [Negative] : Neurological [Palpitations] : no palpitations [FreeTextEntry5] : Sleeps upright [FreeTextEntry6] : Baseline NAVARRO [de-identified] : Few bruises

## 2021-11-12 NOTE — ASSESSMENT
[High Risk Surgery - Intraperitoneal, Intrathoracic or Supringuinal Vascular Procedures] : High Risk Surgery - Intraperitoneal, Intrathoracic or Supringuinal Vascular Procedures - No (0) [Ischemic Heart Disease] : Ischemic Heart Disease  - Yes (1) [Congestive Heart Failure] : Congestive Heart Failure - Yes (1) [Prior Cerebrovascular Accident or TIA] : Prior Cerebrovascular Accident or TIA - No (0) [Creatinine >= 2mg/dL (1 Point)] : Creatinine >= 2mg/dL - No (0) [Insulin-dependent Diabetic (1 Point)] : Insulin-dependent Diabetic - No (0) [2] : 2 , RCRI Class: III, Risk of Post-Op Cardiac Complications: 10.1%, 95% CI for Risk Estimate: 8.1% - 12.6% [Patient Optimized for Surgery] : Patient optimized for surgery [As per surgery] : as per surgery [FreeTextEntry4] : 76-year-old female with a history of CAD, status post MI and CABG, chronic angina, stents, obesity, hypertension here for presurgical evaluation.  Based on her history and physical there are no medical contraindications to the planned procedure.  She does have a h/o TYLOR so her O2 sats will need to be watched closely.  I went over patient's recent imaging and showed her where the edge of the stent is which is not too far up into the ureter and so should be relatively easy to get.  In terms of her general health, she will work on weight loss and her emphysema is controlled.  No complaints of intermittent claudication at this time. [FreeTextEntry7] : Stop supplements and any multivitamins or vitamin E.  Okay to take her B complex and vitamin D.  Continue with famotidine.

## 2021-11-12 NOTE — ADDENDUM
[FreeTextEntry1] : 11/12/21:  Labs done at PST's reviewed and acceptable.  EKG done 8/21 - NSR, no acute ST-T changes.  May proceed with procedure as scheduled.

## 2021-11-14 ENCOUNTER — OUTPATIENT (OUTPATIENT)
Dept: OUTPATIENT SERVICES | Facility: HOSPITAL | Age: 76
LOS: 1 days | End: 2021-11-14
Payer: MEDICARE

## 2021-11-14 DIAGNOSIS — Z95.1 PRESENCE OF AORTOCORONARY BYPASS GRAFT: Chronic | ICD-10-CM

## 2021-11-14 DIAGNOSIS — Z98.890 OTHER SPECIFIED POSTPROCEDURAL STATES: Chronic | ICD-10-CM

## 2021-11-14 DIAGNOSIS — Z98.49 CATARACT EXTRACTION STATUS, UNSPECIFIED EYE: Chronic | ICD-10-CM

## 2021-11-14 DIAGNOSIS — Z11.52 ENCOUNTER FOR SCREENING FOR COVID-19: ICD-10-CM

## 2021-11-14 DIAGNOSIS — N81.4 UTEROVAGINAL PROLAPSE, UNSPECIFIED: Chronic | ICD-10-CM

## 2021-11-14 DIAGNOSIS — Z90.49 ACQUIRED ABSENCE OF OTHER SPECIFIED PARTS OF DIGESTIVE TRACT: Chronic | ICD-10-CM

## 2021-11-14 DIAGNOSIS — Z90.710 ACQUIRED ABSENCE OF BOTH CERVIX AND UTERUS: Chronic | ICD-10-CM

## 2021-11-14 DIAGNOSIS — Z93.6 OTHER ARTIFICIAL OPENINGS OF URINARY TRACT STATUS: Chronic | ICD-10-CM

## 2021-11-14 LAB — SARS-COV-2 RNA SPEC QL NAA+PROBE: SIGNIFICANT CHANGE UP

## 2021-11-16 ENCOUNTER — TRANSCRIPTION ENCOUNTER (OUTPATIENT)
Age: 76
End: 2021-11-16

## 2021-11-17 ENCOUNTER — RESULT REVIEW (OUTPATIENT)
Age: 76
End: 2021-11-17

## 2021-11-17 ENCOUNTER — OUTPATIENT (OUTPATIENT)
Dept: OUTPATIENT SERVICES | Facility: HOSPITAL | Age: 76
LOS: 1 days | End: 2021-11-17
Payer: MEDICARE

## 2021-11-17 VITALS
TEMPERATURE: 98 F | DIASTOLIC BLOOD PRESSURE: 77 MMHG | RESPIRATION RATE: 18 BRPM | SYSTOLIC BLOOD PRESSURE: 143 MMHG | HEIGHT: 63 IN | WEIGHT: 261.03 LBS | OXYGEN SATURATION: 95 % | HEART RATE: 78 BPM

## 2021-11-17 VITALS
OXYGEN SATURATION: 96 % | SYSTOLIC BLOOD PRESSURE: 138 MMHG | HEART RATE: 85 BPM | DIASTOLIC BLOOD PRESSURE: 69 MMHG | RESPIRATION RATE: 14 BRPM

## 2021-11-17 DIAGNOSIS — Z98.890 OTHER SPECIFIED POSTPROCEDURAL STATES: Chronic | ICD-10-CM

## 2021-11-17 DIAGNOSIS — N20.0 CALCULUS OF KIDNEY: ICD-10-CM

## 2021-11-17 DIAGNOSIS — Z90.710 ACQUIRED ABSENCE OF BOTH CERVIX AND UTERUS: Chronic | ICD-10-CM

## 2021-11-17 DIAGNOSIS — Z98.49 CATARACT EXTRACTION STATUS, UNSPECIFIED EYE: Chronic | ICD-10-CM

## 2021-11-17 DIAGNOSIS — Z90.49 ACQUIRED ABSENCE OF OTHER SPECIFIED PARTS OF DIGESTIVE TRACT: Chronic | ICD-10-CM

## 2021-11-17 DIAGNOSIS — Z95.1 PRESENCE OF AORTOCORONARY BYPASS GRAFT: Chronic | ICD-10-CM

## 2021-11-17 DIAGNOSIS — Z93.6 OTHER ARTIFICIAL OPENINGS OF URINARY TRACT STATUS: Chronic | ICD-10-CM

## 2021-11-17 DIAGNOSIS — N81.4 UTEROVAGINAL PROLAPSE, UNSPECIFIED: Chronic | ICD-10-CM

## 2021-11-17 PROCEDURE — 52356 CYSTO/URETERO W/LITHOTRIPSY: CPT | Mod: LT

## 2021-11-17 PROCEDURE — 76000 FLUOROSCOPY <1 HR PHYS/QHP: CPT

## 2021-11-17 PROCEDURE — U0005: CPT

## 2021-11-17 PROCEDURE — U0003: CPT

## 2021-11-17 PROCEDURE — C1889: CPT

## 2021-11-17 PROCEDURE — 88300 SURGICAL PATH GROSS: CPT | Mod: 26

## 2021-11-17 PROCEDURE — 88300 SURGICAL PATH GROSS: CPT

## 2021-11-17 PROCEDURE — C2617: CPT

## 2021-11-17 PROCEDURE — C9399: CPT

## 2021-11-17 PROCEDURE — C1769: CPT

## 2021-11-17 PROCEDURE — ZZZZZ: CPT

## 2021-11-17 PROCEDURE — C1758: CPT

## 2021-11-17 PROCEDURE — C9803: CPT

## 2021-11-17 RX ORDER — POLYETHYLENE GLYCOL 3350 17 G/17G
1 POWDER, FOR SOLUTION ORAL
Qty: 0 | Refills: 0 | DISCHARGE

## 2021-11-17 RX ORDER — LIDOCAINE HCL 20 MG/ML
0.2 VIAL (ML) INJECTION ONCE
Refills: 0 | Status: DISCONTINUED | OUTPATIENT
Start: 2021-11-17 | End: 2021-11-17

## 2021-11-17 RX ORDER — ONDANSETRON 8 MG/1
4 TABLET, FILM COATED ORAL ONCE
Refills: 0 | Status: DISCONTINUED | OUTPATIENT
Start: 2021-11-17 | End: 2021-11-17

## 2021-11-17 RX ORDER — SODIUM CHLORIDE 9 MG/ML
3 INJECTION INTRAMUSCULAR; INTRAVENOUS; SUBCUTANEOUS EVERY 8 HOURS
Refills: 0 | Status: DISCONTINUED | OUTPATIENT
Start: 2021-11-17 | End: 2021-11-17

## 2021-11-17 RX ORDER — SODIUM CHLORIDE 9 MG/ML
1000 INJECTION, SOLUTION INTRAVENOUS
Refills: 0 | Status: DISCONTINUED | OUTPATIENT
Start: 2021-11-17 | End: 2021-12-01

## 2021-11-17 RX ORDER — HYDROMORPHONE HYDROCHLORIDE 2 MG/ML
0.5 INJECTION INTRAMUSCULAR; INTRAVENOUS; SUBCUTANEOUS
Refills: 0 | Status: DISCONTINUED | OUTPATIENT
Start: 2021-11-17 | End: 2021-11-17

## 2021-11-17 RX ADMIN — SODIUM CHLORIDE 125 MILLILITER(S): 9 INJECTION, SOLUTION INTRAVENOUS at 11:46

## 2021-11-17 NOTE — ASU PATIENT PROFILE, ADULT - NSICDXPASTSURGICALHX_GEN_ALL_CORE_FT
PAST SURGICAL HISTORY:  H/O abdominal hysterectomy ROBERTO 1978  cervical cancer   no radiation no chemo    H/O lithotripsy ESWL 1986 left, 2004 right, 2007 left    History of appendectomy 1954    Nephrostomy status percutaneous nephrolithotomy with nephrostomy tube for removal of right kidney stone (2017), left nephrostomy tube placement 8/5/2021    Prolapse, uterovaginal s/p TVT Sling 2000    S/P breast lumpectomy left, benign 1998    S/P CABG x 4 5/31/2019, Dr. Goncalves at [facility]    S/P cataract surgery bilateral, 2015    S/P cholecystectomy 2005    S/P colonoscopy with polypectomy     S/P ERCP 2006

## 2021-11-17 NOTE — BRIEF OPERATIVE NOTE - OPERATION/FINDINGS
ureteral stent previously placed, migrated and encrusted; lasered and basket removed ureteral stones

## 2021-11-17 NOTE — BRIEF OPERATIVE NOTE - NSICDXBRIEFPROCEDURE_GEN_ALL_CORE_FT
PROCEDURES:  Ureteroscopy with laser lithotripsy and stent placement 17-Nov-2021 12:21:37  Diana Castellano

## 2021-11-17 NOTE — PRE-ANESTHESIA EVALUATION ADULT - HEIGHT IN INCHES
3 Banner Transposition Flap Text: The defect edges were debeveled with a #15 scalpel blade.  Given the location of the defect and the proximity to free margins a Banner transposition flap was deemed most appropriate.  Using a sterile surgical marker, an appropriate flap drawn around the defect. The area thus outlined was incised deep to adipose tissue with a #15 scalpel blade.  The skin margins were undermined to an appropriate distance in all directions utilizing iris scissors.

## 2021-11-17 NOTE — ASU PATIENT PROFILE, ADULT - NSICDXPASTMEDICALHX_GEN_ALL_CORE_FT
PAST MEDICAL HISTORY:  Bulging lumbar disc     CAD (coronary artery disease) 1 stent (D1) - 4/21 - Dr. Metcalf    Calculus of kidney     Cataract     Chronic bronchitis     Emphysema lung mild, on CT scan 2017    H/O heart failure last admission 2019  EF 50% (4/2021)    History of blood transfusion     History of sciatica     History of spinal stenosis dx 2008    History of unstable angina last admission 4/27/2021 at [facility]   prompted left cardiac cath with coronary artery stenting (LAD)   on prasugrel   and on aspirin   last nitro since april 2021  cath done by Dr. Dwight Metcalf    HTN (hypertension) diagnosed 10/2017    Lumbar herniated disc     Malignant neoplasm of cervix, unspecified site s/p ROBERTO 1978    Morbid obesity with BMI of 40.0-44.9, adult     TYLOR (obstructive sleep apnea) as per [facility] criteria; Did not complete sleep study yet    Proteinuria     Smoker 1/2 pack X 55 years    Snores sleep study 6/2021, no result    Spinal stenosis of lumbar region

## 2021-11-18 NOTE — ED PROVIDER NOTE - NS ED ROS FT
General: denies fever, chills  HENT: denies nasal congestion, rhinorrhea  Eyes: denies visual changes, blurred vision  CV: denies chest pain, palpitations  Resp: denies difficulty breathing, cough  Abdominal: denies nausea, vomiting, diarrhea, abdominal pain  : denies urinary pain or discharge  MSK: denies muscle aches, leg swelling  Neuro: denies headaches, numbness, tingling  Skin: denies rashes, bruises
MONICA Ramirez MD

## 2021-11-24 LAB — NIDUS STONE QN: SIGNIFICANT CHANGE UP

## 2021-12-07 LAB — SURGICAL PATHOLOGY STUDY: SIGNIFICANT CHANGE UP

## 2022-01-10 ENCOUNTER — INPATIENT (INPATIENT)
Facility: HOSPITAL | Age: 77
LOS: 4 days | Discharge: HOME | DRG: 372 | End: 2022-01-15
Attending: INTERNAL MEDICINE | Admitting: INTERNAL MEDICINE
Payer: MEDICARE

## 2022-01-10 VITALS
SYSTOLIC BLOOD PRESSURE: 136 MMHG | TEMPERATURE: 98 F | DIASTOLIC BLOOD PRESSURE: 84 MMHG | WEIGHT: 259.93 LBS | OXYGEN SATURATION: 97 % | HEIGHT: 63 IN | RESPIRATION RATE: 16 BRPM | HEART RATE: 82 BPM

## 2022-01-10 DIAGNOSIS — Z93.6 OTHER ARTIFICIAL OPENINGS OF URINARY TRACT STATUS: Chronic | ICD-10-CM

## 2022-01-10 DIAGNOSIS — Z90.49 ACQUIRED ABSENCE OF OTHER SPECIFIED PARTS OF DIGESTIVE TRACT: Chronic | ICD-10-CM

## 2022-01-10 DIAGNOSIS — J43.9 EMPHYSEMA, UNSPECIFIED: ICD-10-CM

## 2022-01-10 DIAGNOSIS — Z95.1 PRESENCE OF AORTOCORONARY BYPASS GRAFT: Chronic | ICD-10-CM

## 2022-01-10 DIAGNOSIS — Z98.890 OTHER SPECIFIED POSTPROCEDURAL STATES: Chronic | ICD-10-CM

## 2022-01-10 DIAGNOSIS — Z90.710 ACQUIRED ABSENCE OF BOTH CERVIX AND UTERUS: Chronic | ICD-10-CM

## 2022-01-10 DIAGNOSIS — K56.609 UNSPECIFIED INTESTINAL OBSTRUCTION, UNSPECIFIED AS TO PARTIAL VERSUS COMPLETE OBSTRUCTION: ICD-10-CM

## 2022-01-10 DIAGNOSIS — I25.10 ATHEROSCLEROTIC HEART DISEASE OF NATIVE CORONARY ARTERY WITHOUT ANGINA PECTORIS: ICD-10-CM

## 2022-01-10 DIAGNOSIS — N13.30 UNSPECIFIED HYDRONEPHROSIS: ICD-10-CM

## 2022-01-10 DIAGNOSIS — Z98.49 CATARACT EXTRACTION STATUS, UNSPECIFIED EYE: Chronic | ICD-10-CM

## 2022-01-10 DIAGNOSIS — N81.4 UTEROVAGINAL PROLAPSE, UNSPECIFIED: Chronic | ICD-10-CM

## 2022-01-10 LAB
ALBUMIN SERPL ELPH-MCNC: 3 G/DL — LOW (ref 3.3–5)
ALP SERPL-CCNC: 82 U/L — SIGNIFICANT CHANGE UP (ref 30–120)
ALT FLD-CCNC: <10 U/L DA — LOW (ref 10–60)
ANION GAP SERPL CALC-SCNC: 7 MMOL/L — SIGNIFICANT CHANGE UP (ref 5–17)
AST SERPL-CCNC: 19 U/L — SIGNIFICANT CHANGE UP (ref 10–40)
BASOPHILS # BLD AUTO: 0.01 K/UL — SIGNIFICANT CHANGE UP (ref 0–0.2)
BASOPHILS NFR BLD AUTO: 0.2 % — SIGNIFICANT CHANGE UP (ref 0–2)
BILIRUB SERPL-MCNC: 0.3 MG/DL — SIGNIFICANT CHANGE UP (ref 0.2–1.2)
BUN SERPL-MCNC: 20 MG/DL — SIGNIFICANT CHANGE UP (ref 7–23)
CALCIUM SERPL-MCNC: 8.7 MG/DL — SIGNIFICANT CHANGE UP (ref 8.4–10.5)
CHLORIDE SERPL-SCNC: 102 MMOL/L — SIGNIFICANT CHANGE UP (ref 96–108)
CO2 SERPL-SCNC: 28 MMOL/L — SIGNIFICANT CHANGE UP (ref 22–31)
CREAT SERPL-MCNC: 1.04 MG/DL — SIGNIFICANT CHANGE UP (ref 0.5–1.3)
EOSINOPHIL # BLD AUTO: 0.05 K/UL — SIGNIFICANT CHANGE UP (ref 0–0.5)
EOSINOPHIL NFR BLD AUTO: 0.8 % — SIGNIFICANT CHANGE UP (ref 0–6)
GLUCOSE SERPL-MCNC: 112 MG/DL — HIGH (ref 70–99)
HCT VFR BLD CALC: 37.5 % — SIGNIFICANT CHANGE UP (ref 34.5–45)
HGB BLD-MCNC: 11.8 G/DL — SIGNIFICANT CHANGE UP (ref 11.5–15.5)
IMM GRANULOCYTES NFR BLD AUTO: 0.2 % — SIGNIFICANT CHANGE UP (ref 0–1.5)
LIDOCAIN IGE QN: 45 U/L — LOW (ref 73–393)
LYMPHOCYTES # BLD AUTO: 1.32 K/UL — SIGNIFICANT CHANGE UP (ref 1–3.3)
LYMPHOCYTES # BLD AUTO: 22.4 % — SIGNIFICANT CHANGE UP (ref 13–44)
MCHC RBC-ENTMCNC: 29.8 PG — SIGNIFICANT CHANGE UP (ref 27–34)
MCHC RBC-ENTMCNC: 31.5 GM/DL — LOW (ref 32–36)
MCV RBC AUTO: 94.7 FL — SIGNIFICANT CHANGE UP (ref 80–100)
MONOCYTES # BLD AUTO: 0.76 K/UL — SIGNIFICANT CHANGE UP (ref 0–0.9)
MONOCYTES NFR BLD AUTO: 12.9 % — SIGNIFICANT CHANGE UP (ref 2–14)
NEUTROPHILS # BLD AUTO: 3.75 K/UL — SIGNIFICANT CHANGE UP (ref 1.8–7.4)
NEUTROPHILS NFR BLD AUTO: 63.5 % — SIGNIFICANT CHANGE UP (ref 43–77)
NRBC # BLD: 0 /100 WBCS — SIGNIFICANT CHANGE UP (ref 0–0)
PLATELET # BLD AUTO: 326 K/UL — SIGNIFICANT CHANGE UP (ref 150–400)
POTASSIUM SERPL-MCNC: 3.8 MMOL/L — SIGNIFICANT CHANGE UP (ref 3.5–5.3)
POTASSIUM SERPL-SCNC: 3.8 MMOL/L — SIGNIFICANT CHANGE UP (ref 3.5–5.3)
PROT SERPL-MCNC: 7.5 G/DL — SIGNIFICANT CHANGE UP (ref 6–8.3)
RBC # BLD: 3.96 M/UL — SIGNIFICANT CHANGE UP (ref 3.8–5.2)
RBC # FLD: 14.7 % — HIGH (ref 10.3–14.5)
SARS-COV-2 RNA SPEC QL NAA+PROBE: SIGNIFICANT CHANGE UP
SODIUM SERPL-SCNC: 137 MMOL/L — SIGNIFICANT CHANGE UP (ref 135–145)
WBC # BLD: 5.9 K/UL — SIGNIFICANT CHANGE UP (ref 3.8–10.5)
WBC # FLD AUTO: 5.9 K/UL — SIGNIFICANT CHANGE UP (ref 3.8–10.5)

## 2022-01-10 PROCEDURE — 74177 CT ABD & PELVIS W/CONTRAST: CPT | Mod: 26,MG

## 2022-01-10 PROCEDURE — 99285 EMERGENCY DEPT VISIT HI MDM: CPT

## 2022-01-10 PROCEDURE — G1004: CPT

## 2022-01-10 RX ORDER — ATORVASTATIN CALCIUM 80 MG/1
80 TABLET, FILM COATED ORAL AT BEDTIME
Refills: 0 | Status: DISCONTINUED | OUTPATIENT
Start: 2022-01-10 | End: 2022-01-15

## 2022-01-10 RX ORDER — ASPIRIN/CALCIUM CARB/MAGNESIUM 324 MG
0 TABLET ORAL
Qty: 0 | Refills: 0 | DISCHARGE

## 2022-01-10 RX ORDER — SODIUM CHLORIDE 9 MG/ML
1000 INJECTION INTRAMUSCULAR; INTRAVENOUS; SUBCUTANEOUS ONCE
Refills: 0 | Status: COMPLETED | OUTPATIENT
Start: 2022-01-10 | End: 2022-01-10

## 2022-01-10 RX ORDER — ASPIRIN/CALCIUM CARB/MAGNESIUM 324 MG
81 TABLET ORAL DAILY
Refills: 0 | Status: DISCONTINUED | OUTPATIENT
Start: 2022-01-10 | End: 2022-01-15

## 2022-01-10 RX ORDER — IPRATROPIUM BROMIDE 0.2 MG/ML
500 SOLUTION, NON-ORAL INHALATION EVERY 6 HOURS
Refills: 0 | Status: DISCONTINUED | OUTPATIENT
Start: 2022-01-10 | End: 2022-01-15

## 2022-01-10 RX ORDER — ROSUVASTATIN CALCIUM 5 MG/1
1 TABLET ORAL
Qty: 0 | Refills: 0 | DISCHARGE

## 2022-01-10 RX ORDER — RANOLAZINE 500 MG/1
1 TABLET, FILM COATED, EXTENDED RELEASE ORAL
Qty: 0 | Refills: 0 | DISCHARGE

## 2022-01-10 RX ORDER — ENOXAPARIN SODIUM 100 MG/ML
40 INJECTION SUBCUTANEOUS DAILY
Refills: 0 | Status: DISCONTINUED | OUTPATIENT
Start: 2022-01-10 | End: 2022-01-15

## 2022-01-10 RX ORDER — SODIUM CHLORIDE 9 MG/ML
1000 INJECTION INTRAMUSCULAR; INTRAVENOUS; SUBCUTANEOUS
Refills: 0 | Status: DISCONTINUED | OUTPATIENT
Start: 2022-01-10 | End: 2022-01-15

## 2022-01-10 RX ORDER — PRASUGREL 5 MG/1
5 TABLET, FILM COATED ORAL DAILY
Refills: 0 | Status: DISCONTINUED | OUTPATIENT
Start: 2022-01-11 | End: 2022-01-15

## 2022-01-10 RX ORDER — RANOLAZINE 500 MG/1
500 TABLET, FILM COATED, EXTENDED RELEASE ORAL
Refills: 0 | Status: DISCONTINUED | OUTPATIENT
Start: 2022-01-10 | End: 2022-01-15

## 2022-01-10 RX ORDER — FAMOTIDINE 10 MG/ML
40 INJECTION INTRAVENOUS
Refills: 0 | Status: DISCONTINUED | OUTPATIENT
Start: 2022-01-10 | End: 2022-01-15

## 2022-01-10 RX ORDER — PRASUGREL 5 MG/1
1 TABLET, FILM COATED ORAL
Qty: 0 | Refills: 0 | DISCHARGE

## 2022-01-10 RX ORDER — METOPROLOL TARTRATE 50 MG
100 TABLET ORAL DAILY
Refills: 0 | Status: DISCONTINUED | OUTPATIENT
Start: 2022-01-10 | End: 2022-01-15

## 2022-01-10 RX ORDER — ONDANSETRON 8 MG/1
4 TABLET, FILM COATED ORAL EVERY 4 HOURS
Refills: 0 | Status: DISCONTINUED | OUTPATIENT
Start: 2022-01-10 | End: 2022-01-15

## 2022-01-10 RX ORDER — FUROSEMIDE 40 MG
1 TABLET ORAL
Qty: 0 | Refills: 0 | DISCHARGE

## 2022-01-10 RX ORDER — FAMOTIDINE 10 MG/ML
1 INJECTION INTRAVENOUS
Qty: 0 | Refills: 0 | DISCHARGE

## 2022-01-10 RX ORDER — METOPROLOL TARTRATE 50 MG
1 TABLET ORAL
Qty: 0 | Refills: 0 | DISCHARGE

## 2022-01-10 RX ADMIN — SODIUM CHLORIDE 1000 MILLILITER(S): 9 INJECTION INTRAMUSCULAR; INTRAVENOUS; SUBCUTANEOUS at 16:35

## 2022-01-10 RX ADMIN — ATORVASTATIN CALCIUM 80 MILLIGRAM(S): 80 TABLET, FILM COATED ORAL at 22:26

## 2022-01-10 RX ADMIN — SODIUM CHLORIDE 75 MILLILITER(S): 9 INJECTION INTRAMUSCULAR; INTRAVENOUS; SUBCUTANEOUS at 22:25

## 2022-01-10 RX ADMIN — FAMOTIDINE 40 MILLIGRAM(S): 10 INJECTION INTRAVENOUS at 22:26

## 2022-01-10 RX ADMIN — Medication 100 MILLIGRAM(S): at 22:26

## 2022-01-10 RX ADMIN — RANOLAZINE 500 MILLIGRAM(S): 500 TABLET, FILM COATED, EXTENDED RELEASE ORAL at 22:26

## 2022-01-10 RX ADMIN — ONDANSETRON 4 MILLIGRAM(S): 8 TABLET, FILM COATED ORAL at 22:29

## 2022-01-10 RX ADMIN — Medication 81 MILLIGRAM(S): at 22:27

## 2022-01-10 NOTE — ED PROVIDER NOTE - NOTES
states possible SBO vs enteritis, no NG tube or surgical intervention at this time, will need further eval including CT with PO contrast, will see pt dw Dr. Miranda who states he will see pt, likely can just f/u with her urologist as outpatient dw Dr. Claudio, states will see pt

## 2022-01-10 NOTE — ED ADULT NURSE NOTE - BOWEL SOUNDS RUQ
Quality 402: Tobacco Use And Help With Quitting Among Adolescents: Patient screened for tobacco and never smoked Detail Level: Detailed Quality 130: Documentation Of Current Medications In The Medical Record: Current Medications Documented present

## 2022-01-10 NOTE — ED ADULT NURSE NOTE - OBJECTIVE STATEMENT
from home, patient has c/o diarrhea x 3 days, patient thought she has food poisoned and usually diarrhea goes away with rest but this time, getting worse, bs +, nontender, pain from hunger, Patient denies sick contacts/ no fever/chills/cough at this time, denies SOB and no acute distress. Labs drawn & sent results pending. will continue to monitor.

## 2022-01-10 NOTE — H&P ADULT - HISTORY OF PRESENT ILLNESS
76 F with PMhx of bronchitis, emphysema, TYLOR, CAD (coronary artery disease),ch diastolic, heart failure   4/27/2021 at [facility] ,prompted left cardiac cath with coronary artery stenting (LAD) spinal stenosis, Cervical Ca s/p ROBERTO, s/p Appendectomy, s/p cholecystectomy, s/p left lumpectomy,nephrolithiasis,withobstruction and left hydronephrosis, had Left percutaneous nephrolithotomy and stent in 8/21,Left ureteral stent placement.in 11/21Left ureteroscopy with laser lithotripsy, retrograde pyelogram and ureteral stent exchange, presents on 1/10 to [facility] ED,c/o watery nonbloody diarrhea x 3 days. Reports that she ate sushi friday, symptoms started the next day but mild, then increased. Tried immodium without relief. Denies recent abx use. Has been eating bland diet. Denies f/c, sore throat, cough, CP, SOB, vomiting, urinary complaints. No covid vaccine.PMD: Scarlet Kuhn  In Ed had CT abd pelvis  < from: CT Abdomen and Pelvis w/ IV Cont (01.10.22 @ 18:21) >  1.  Moderate dilatation of the proximal small bowel with probable   transition point in the RIGHT lower quadrant. Findings suggest low to   moderate grade small bowel obstruction. Fluid and fat stranding within   the mesentery is somewhat concerning and follow-up studies are   recommended. Consider surgical consult.  2.  ModerateLEFT hydronephrosis and dilatation of the LEFT renal pelvis   with possible stricture at the LEFT UPJ.    In ED afebrile, normotensive, admitted for further management for     SBO (small bowel obstruction).    Hydronephrosis, left.  nephrolithiasis  CAD s/p PCI  COPD  CH Diastolic heart failure.  h/o multiple abdominal surg

## 2022-01-10 NOTE — H&P ADULT - NSICDXPASTMEDICALHX_GEN_ALL_CORE_FT
PAST MEDICAL HISTORY:  Bulging lumbar disc     CAD (coronary artery disease) 1 stent (D1) - 4/21 - Dr. Metcalf    Calculus of kidney     Cataract     Chronic bronchitis     Emphysema lung mild, on CT scan 2017    H/O heart failure last admission 2019  EF 50% (4/2021)    History of blood transfusion     History of sciatica     History of spinal stenosis dx 2008    History of unstable angina last admission 4/27/2021 at [facility]   prompted left cardiac cath with coronary artery stenting (LAD)   on prasugrel   and on aspirin   last nitro since april 2021  cath done by Dr. Dwight Metcalf    HTN (hypertension) diagnosed 10/2017    Lumbar herniated disc     Malignant neoplasm of cervix, unspecified site s/p ROBERTO 1978    Morbid obesity with BMI of 40.0-44.9, adult     TYLOR (obstructive sleep apnea) as per [facility] criteria; Did not complete sleep study yet    Proteinuria     Smoker 1/2 pack X 55 years    Snores sleep study 6/2021, no result    Spinal stenosis of lumbar region

## 2022-01-10 NOTE — ED ADULT NURSE NOTE - NSICDXPASTMEDICALHX_GEN_ALL_CORE_FT
PAST MEDICAL HISTORY:  Bulging lumbar disc     CAD (coronary artery disease) 1 stent (D1) - 4/21 - Dr. Metcalf    Calculus of kidney     Cataract     Chronic bronchitis     Emphysema lung mild, on CT scan 2017    H/O heart failure last admission 2019  EF 50% (4/2021)    History of blood transfusion     History of sciatica     History of spinal stenosis dx 2008    History of unstable angina last admission 4/27/2021 at [facility]   prompted left cardiac cath with coronary artery stenting (LAD)   on prasugrel   and on aspirin   last nitro since april 2021  cath done by Dr. Dwight Metcalf    HTN (hypertension) diagnosed 10/2017    Lumbar herniated disc     Malignant neoplasm of cervix, unspecified site s/p ROBERTO 1978    Morbid obesity with BMI of 40.0-44.9, adult     TYLOR (obstructive sleep apnea) as per [facility] criteria; Did not complete sleep study yet    Proteinuria     Smoker 1/2 pack X 55 years    Snores sleep study 6/2021, no result    Spinal stenosis of lumbar region

## 2022-01-10 NOTE — ED PROVIDER NOTE - OBJECTIVE STATEMENT
76 F c/o watery nonbloody diarrhea for the past 3 days. Reports that she ate sushi thursday, symptoms started the next day but mild, then increased over the weekend. Tried immodium without relief. Denies recent abx use. Has been eating bland diet. Denies f/c, sore throat, cough, CP, SOB, vomiting, urinary complaints.     No covid vaccine.  PMD: Scarlet Kuhn 76 F c/o watery nonbloody diarrhea x 3 days. Reports that she ate sushi friday, symptoms started the next day but mild, then increased. Tried immodium without relief. Denies recent abx use. Has been eating bland diet. Denies f/c, sore throat, cough, CP, SOB, vomiting, urinary complaints.     No covid vaccine.  PMD: Scarlet Kuhn

## 2022-01-10 NOTE — ED PROVIDER NOTE - PROGRESS NOTE DETAILS
Aruna AGUILAR for attending Dr. Kaur: 77 y/o female with a PMHx of bulging lumbar disc, CAD s/p stent & CABG x4, cataract, cervical CA s/p ROBERTO, chronic bronchitis, emphysema lung, herniated disc, h/o heart failure, HTN, kidney calculus, morbid obesity, TYLOR, proteinuria, sciatica, spinal stenosis, unstable angina, uterovaginal prolapse s/p TVT sling, s/p appendectomy, breast lumpectomy, cataract surgery, ERCP, cholecystectomy presents to the ED c/o diarrhea, diffuse generalized abd discomfort, and nausea since the morning of 01/08/2021. Pt relates she had sushi on 01/07 evening and felt fine but woke up the next morning with symptoms. No fevers, chills, CP, SOB, vomiting, or urinary complaints.     Physical Exam: WD, WN, in NAD, pink conjunctiva, no scleral icterus, moist mucous membranes, heart: S1, S2 RRR, lungs clear to ascultation, abd: very mild diffuse abd tenderness & no CVA tenderness.

## 2022-01-10 NOTE — ED PROVIDER NOTE - NSICDXPASTSURGICALHX_GEN_ALL_CORE_FT
PAST SURGICAL HISTORY:  H/O abdominal hysterectomy ROBERTO 1978  cervical cancer   no radiation no chemo    H/O lithotripsy ESWL 1986 left, 2004 right, 2007 left    History of appendectomy 1954    Nephrostomy status percutaneous nephrolithotomy with nephrostomy tube for removal of right kidney stone (2017), left nephrostomy tube placement 8/5/2021    Prolapse, uterovaginal s/p TVT Sling 2000    S/P breast lumpectomy left, benign 1998    S/P CABG x 4 5/31/2019, Dr. Goncalves at [facility]    S/P cataract surgery bilateral, 2015    S/P cholecystectomy 2005    S/P colonoscopy with polypectomy     S/P ERCP 2006

## 2022-01-10 NOTE — ED PROVIDER NOTE - NSICDXPASTMEDICALHX_GEN_ALL_CORE_FT
PAST MEDICAL HISTORY:  Bulging lumbar disc     CAD (coronary artery disease) 1 stent (D1) - 4/21 - Dr. Metcalf    Calculus of kidney     Cataract     Chronic bronchitis     Emphysema lung mild, on CT scan 2017    H/O heart failure last admission 2019  EF 50% (4/2021)    History of blood transfusion     History of sciatica     History of spinal stenosis dx 2008    History of unstable angina last admission 4/27/2021 at [facility]   prompted left cardiac cath with coronary artery stenting (LAD)   on prasugrel   and on aspirin   last nitro since april 2021  cath done by Dr. Dwight Metcalf    HTN (hypertension) diagnosed 10/2017    Lumbar herniated disc     Malignant neoplasm of cervix, unspecified site s/p ROBERTO 1978    Morbid obesity with BMI of 40.0-44.9, adult     TYLOR (obstructive sleep apnea) as per [facility] criteria; Did not complete sleep study yet    Proteinuria     Smoker 1/2 pack X 55 years    Snores sleep study 6/2021, no result    Spinal stenosis of lumbar region

## 2022-01-10 NOTE — ED ADULT NURSE NOTE - NSICDXPASTSURGICALHX_GEN_ALL_CORE_FT
PAST SURGICAL HISTORY:  H/O abdominal hysterectomy ROBERTO 1978  cervical cancer   no radiation no chemo    H/O lithotripsy ESWL 1986 left, 2004 right, 2007 left    History of appendectomy 1954    Nephrostomy status percutaneous nephrolithotomy with nephrostomy tube for removal of right kidney stone (2017), left nephrostomy tube placement 8/5/2021    Prolapse, uterovaginal s/p TVT Sling 2000    S/P breast lumpectomy left, benign 1998    S/P CABG x 4 5/31/2019, Dr. Goncalves at [facility]    S/P cataract surgery bilateral, 2015    S/P cholecystectomy 2005    S/P colonoscopy with polypectomy     S/P ERCP 2006

## 2022-01-10 NOTE — H&P ADULT - ASSESSMENT
76 F with PMhx of bronchitis, emphysema, TYLOR, CAD (coronary artery disease),ch diastolic, heart failure   4/27/2021 at [facility] ,prompted left cardiac cath with coronary artery stenting (LAD) spinal stenosis, Cervical Ca s/p ROBERTO, s/p Appendectomy, s/p cholecystectomy, s/p left lumpectomy,nephrolithiasis,withobstruction and left hydronephrosis, had Left percutaneous nephrolithotomy and stent in 8/21,Left ureteral stent placement.in 11/21Left ureteroscopy with laser lithotripsy, retrograde pyelogram and ureteral stent exchange, presents on 1/10 to [facility] ED,c/o watery nonbloody diarrhea x 3 days. Reports that she ate sushi friday, symptoms started the next day but mild, then increased. Tried immodium without relief. Denies recent abx use. Has been eating bland diet. Denies f/c, sore throat, cough, CP, SOB, vomiting, urinary complaints. No covid vaccine.PMD: Scarlet Kuhn  In Ed had CT abd pelvis  < from: CT Abdomen and Pelvis w/ IV Cont (01.10.22 @ 18:21) >  1.  Moderate dilatation of the proximal small bowel with probable   transition point in the RIGHT lower quadrant. Findings suggest low to   moderate grade small bowel obstruction. Fluid and fat stranding within   the mesentery is somewhat concerning and follow-up studies are   recommended. Consider surgical consult.  2.  ModerateLEFT hydronephrosis and dilatation of the LEFT renal pelvis   with possible stricture at the LEFT UPJ.    In ED afebrile, normotensive, admitted for further management for     SBO (small bowel obstruction).    Hydronephrosis, left.  nephrolithiasis  CAD s/p PCI  COPD  CH Diastolic heart failure.  h/o multiple abdominal surg  surg, GI, urology consult called

## 2022-01-10 NOTE — H&P ADULT - NSICDXPASTSURGICALHX_GEN_ALL_CORE_FT
PAST SURGICAL HISTORY:  H/O abdominal hysterectomy ROBERTO 1978  cervical cancer   no radiation no chemo    H/O lithotripsy ESWL 1986 left, 2004 right, 2007 left    History of appendectomy 1954    Nephrostomy status percutaneous nephrolithotomy with nephrostomy tube for removal of right kidney stone (2017), left nephrostomy tube placement 8/5/2021    Prolapse, uterovaginal s/p TVT Sling 2000    S/P breast lumpectomy left, benign 1998    S/P CABG x 4 5/31/2019, Dr. Goncalves at [facility]    S/P cataract surgery bilateral, 2015    S/P cholecystectomy 2005    S/P colonoscopy with polypectomy     S/P ERCP 2006

## 2022-01-10 NOTE — ED PROVIDER NOTE - CLINICAL SUMMARY MEDICAL DECISION MAKING FREE TEXT BOX
76 F c/o watery nonbloody diarrhea for the past 3 days. Reports that she ate sushi thursday, symptoms started the next day but mild, then increased over the weekend. Tried immodium without relief. Denies recent abx use. Has been eating bland diet. Denies f/c, sore throat, cough, CP, SOB, vomiting, urinary complaints. - well-appearing, abd soft ntnd - IVF, labs, ct 76 F c/o watery nonbloody diarrhea x 3 days. Reports that she ate sushi friday, symptoms started the next day but mild, then increased. Tried immodium without relief. Denies recent abx use. Has been eating bland diet. Denies f/c, sore throat, cough, CP, SOB, vomiting, urinary complaints.  - well-appearing, abd soft ntnd - IVF, labs, ct

## 2022-01-10 NOTE — ED PROVIDER NOTE - CARE PLAN
Principal Discharge DX:	SBO (small bowel obstruction)  Secondary Diagnosis:	Hydronephrosis, left   1

## 2022-01-11 ENCOUNTER — NON-APPOINTMENT (OUTPATIENT)
Age: 77
End: 2022-01-11

## 2022-01-11 PROCEDURE — 99223 1ST HOSP IP/OBS HIGH 75: CPT

## 2022-01-11 PROCEDURE — 93010 ELECTROCARDIOGRAM REPORT: CPT

## 2022-01-11 PROCEDURE — 74250 X-RAY XM SM INT 1CNTRST STD: CPT | Mod: 26

## 2022-01-11 RX ADMIN — ATORVASTATIN CALCIUM 80 MILLIGRAM(S): 80 TABLET, FILM COATED ORAL at 23:04

## 2022-01-11 RX ADMIN — RANOLAZINE 500 MILLIGRAM(S): 500 TABLET, FILM COATED, EXTENDED RELEASE ORAL at 15:48

## 2022-01-11 RX ADMIN — FAMOTIDINE 40 MILLIGRAM(S): 10 INJECTION INTRAVENOUS at 15:48

## 2022-01-11 RX ADMIN — ENOXAPARIN SODIUM 40 MILLIGRAM(S): 100 INJECTION SUBCUTANEOUS at 15:49

## 2022-01-11 RX ADMIN — FAMOTIDINE 40 MILLIGRAM(S): 10 INJECTION INTRAVENOUS at 23:04

## 2022-01-11 RX ADMIN — Medication 81 MILLIGRAM(S): at 15:48

## 2022-01-11 RX ADMIN — SODIUM CHLORIDE 75 MILLILITER(S): 9 INJECTION INTRAMUSCULAR; INTRAVENOUS; SUBCUTANEOUS at 23:04

## 2022-01-11 RX ADMIN — RANOLAZINE 500 MILLIGRAM(S): 500 TABLET, FILM COATED, EXTENDED RELEASE ORAL at 23:04

## 2022-01-11 RX ADMIN — Medication 100 MILLIGRAM(S): at 23:04

## 2022-01-11 RX ADMIN — PRASUGREL 5 MILLIGRAM(S): 5 TABLET, FILM COATED ORAL at 15:49

## 2022-01-11 NOTE — CONSULT NOTE ADULT - ATTENDING COMMENTS
Patient seen and examined and imaging reviewed.  She reports having continued diarrhaea and states that she has been passing flatus.  The overall clinical picture argues against a small bowel obstruction and is most suggestive of enteritis.  Will follow-up the small bowel series which is currently in progress.  Recommend consulting GI for management of her suspected enteritis.

## 2022-01-11 NOTE — PHYSICAL THERAPY INITIAL EVALUATION ADULT - PERTINENT HX OF CURRENT PROBLEM, REHAB EVAL
c/o watery nonbloody diarrhea x 3 days. Reports that she ate sushi friday, symptoms started the next day but mild, then increased.

## 2022-01-11 NOTE — CONSULT NOTE ADULT - ASSESSMENT
76 F c/o watery nonbloody diarrhea x 3 days. Reports that she ate sushi friday, symptoms started the next day but mild, then increased. Tried immodium without relief. Denies recent abx use. Has been eating bland diet. Denies f/c, sore throat, cough
The patient is a 76 year old female with a history of HTN, HL, TYLOR, COPD, CAD s/p PCI, cervical cancer s/p ROBERTO, nephrolithiasis s/p stent and nephrostomy tube who presents with diarrhea in the setting of possible SBO.    Plan:  - Check ECG  - Continue aspirin 81 mg daily  - Continue prasugrel 5 mg daily  - Continue metoprolol succinate 100 mg daily  - Continue ranolazine 500 mg bid  - Continue atorvastatin 40 mg daily  - Hold furosemide for now  - Continue IV fluids as needed  - Surgery eval  - If plan requires any surgical intervention, would hold prasugrel (patient is about 9 months out from PCI)
sbo  enteritis ? infectious    plan  stool studies  npo  ivf  iv abs  surgery on case   aggree with sbs to be done to assess the anatomy further  send off crohns markers    Advanced care planning was discussed with patient and family.  Advanced care planning forms were reviewed and discussed.  Risks, benefits and alternatives of gastroenterologic procedures were discussed in detail and all questions were answered.    30 minutes spent.  
Incidental finding of left moderate hydro on CT done for GI symptoms  No  interventions anticipated this admission    Outpatient f/u with Dr. Dwight Broderick for w/u and treatment of possible left ureteral stricture  Reconsult prn.

## 2022-01-11 NOTE — PATIENT PROFILE ADULT - FALL HARM RISK - UNIVERSAL INTERVENTIONS
Bed in lowest position, wheels locked, appropriate side rails in place/Call bell, personal items and telephone in reach/Instruct patient to call for assistance before getting out of bed or chair/Non-slip footwear when patient is out of bed/[place] to call system/Physically safe environment - no spills, clutter or unnecessary equipment/Purposeful Proactive Rounding/Room/bathroom lighting operational, light cord in reach

## 2022-01-11 NOTE — PHYSICAL THERAPY INITIAL EVALUATION ADULT - CRITERIA FOR SKILLED THERAPEUTIC INTERVENTIONS
Home no skilled PT/DME needs/anticipated equipment needs at discharge/anticipated discharge recommendation

## 2022-01-12 LAB
ALBUMIN SERPL ELPH-MCNC: 2.5 G/DL — LOW (ref 3.3–5)
ALP SERPL-CCNC: 69 U/L — SIGNIFICANT CHANGE UP (ref 30–120)
ALT FLD-CCNC: 12 U/L DA — SIGNIFICANT CHANGE UP (ref 10–60)
ANION GAP SERPL CALC-SCNC: 6 MMOL/L — SIGNIFICANT CHANGE UP (ref 5–17)
AST SERPL-CCNC: 15 U/L — SIGNIFICANT CHANGE UP (ref 10–40)
BASOPHILS # BLD AUTO: 0.01 K/UL — SIGNIFICANT CHANGE UP (ref 0–0.2)
BASOPHILS NFR BLD AUTO: 0.2 % — SIGNIFICANT CHANGE UP (ref 0–2)
BILIRUB SERPL-MCNC: 0.2 MG/DL — SIGNIFICANT CHANGE UP (ref 0.2–1.2)
BUN SERPL-MCNC: 15 MG/DL — SIGNIFICANT CHANGE UP (ref 7–23)
CALCIUM SERPL-MCNC: 7.9 MG/DL — LOW (ref 8.4–10.5)
CHLORIDE SERPL-SCNC: 107 MMOL/L — SIGNIFICANT CHANGE UP (ref 96–108)
CO2 SERPL-SCNC: 27 MMOL/L — SIGNIFICANT CHANGE UP (ref 22–31)
CREAT SERPL-MCNC: 0.66 MG/DL — SIGNIFICANT CHANGE UP (ref 0.5–1.3)
EOSINOPHIL # BLD AUTO: 0.09 K/UL — SIGNIFICANT CHANGE UP (ref 0–0.5)
EOSINOPHIL NFR BLD AUTO: 2 % — SIGNIFICANT CHANGE UP (ref 0–6)
GLUCOSE SERPL-MCNC: 84 MG/DL — SIGNIFICANT CHANGE UP (ref 70–99)
HCT VFR BLD CALC: 34 % — LOW (ref 34.5–45)
HGB BLD-MCNC: 10.5 G/DL — LOW (ref 11.5–15.5)
IMM GRANULOCYTES NFR BLD AUTO: 0.2 % — SIGNIFICANT CHANGE UP (ref 0–1.5)
LYMPHOCYTES # BLD AUTO: 1.56 K/UL — SIGNIFICANT CHANGE UP (ref 1–3.3)
LYMPHOCYTES # BLD AUTO: 34.4 % — SIGNIFICANT CHANGE UP (ref 13–44)
MCHC RBC-ENTMCNC: 29.4 PG — SIGNIFICANT CHANGE UP (ref 27–34)
MCHC RBC-ENTMCNC: 30.9 GM/DL — LOW (ref 32–36)
MCV RBC AUTO: 95.2 FL — SIGNIFICANT CHANGE UP (ref 80–100)
MONOCYTES # BLD AUTO: 0.42 K/UL — SIGNIFICANT CHANGE UP (ref 0–0.9)
MONOCYTES NFR BLD AUTO: 9.3 % — SIGNIFICANT CHANGE UP (ref 2–14)
NEUTROPHILS # BLD AUTO: 2.45 K/UL — SIGNIFICANT CHANGE UP (ref 1.8–7.4)
NEUTROPHILS NFR BLD AUTO: 53.9 % — SIGNIFICANT CHANGE UP (ref 43–77)
NRBC # BLD: 0 /100 WBCS — SIGNIFICANT CHANGE UP (ref 0–0)
PLATELET # BLD AUTO: 258 K/UL — SIGNIFICANT CHANGE UP (ref 150–400)
POTASSIUM SERPL-MCNC: 3.5 MMOL/L — SIGNIFICANT CHANGE UP (ref 3.5–5.3)
POTASSIUM SERPL-SCNC: 3.5 MMOL/L — SIGNIFICANT CHANGE UP (ref 3.5–5.3)
PROT SERPL-MCNC: 6.4 G/DL — SIGNIFICANT CHANGE UP (ref 6–8.3)
RBC # BLD: 3.57 M/UL — LOW (ref 3.8–5.2)
RBC # FLD: 14.8 % — HIGH (ref 10.3–14.5)
SODIUM SERPL-SCNC: 140 MMOL/L — SIGNIFICANT CHANGE UP (ref 135–145)
WBC # BLD: 4.54 K/UL — SIGNIFICANT CHANGE UP (ref 3.8–10.5)
WBC # FLD AUTO: 4.54 K/UL — SIGNIFICANT CHANGE UP (ref 3.8–10.5)

## 2022-01-12 PROCEDURE — 74018 RADEX ABDOMEN 1 VIEW: CPT | Mod: 26

## 2022-01-12 RX ADMIN — FAMOTIDINE 40 MILLIGRAM(S): 10 INJECTION INTRAVENOUS at 23:41

## 2022-01-12 RX ADMIN — ATORVASTATIN CALCIUM 80 MILLIGRAM(S): 80 TABLET, FILM COATED ORAL at 23:39

## 2022-01-12 RX ADMIN — Medication 81 MILLIGRAM(S): at 12:56

## 2022-01-12 RX ADMIN — RANOLAZINE 500 MILLIGRAM(S): 500 TABLET, FILM COATED, EXTENDED RELEASE ORAL at 10:40

## 2022-01-12 RX ADMIN — SODIUM CHLORIDE 75 MILLILITER(S): 9 INJECTION INTRAMUSCULAR; INTRAVENOUS; SUBCUTANEOUS at 19:18

## 2022-01-12 RX ADMIN — SODIUM CHLORIDE 75 MILLILITER(S): 9 INJECTION INTRAMUSCULAR; INTRAVENOUS; SUBCUTANEOUS at 06:06

## 2022-01-12 RX ADMIN — Medication 100 MILLIGRAM(S): at 23:42

## 2022-01-12 RX ADMIN — PRASUGREL 5 MILLIGRAM(S): 5 TABLET, FILM COATED ORAL at 12:56

## 2022-01-12 RX ADMIN — FAMOTIDINE 40 MILLIGRAM(S): 10 INJECTION INTRAVENOUS at 10:40

## 2022-01-12 RX ADMIN — RANOLAZINE 500 MILLIGRAM(S): 500 TABLET, FILM COATED, EXTENDED RELEASE ORAL at 23:41

## 2022-01-12 RX ADMIN — ENOXAPARIN SODIUM 40 MILLIGRAM(S): 100 INJECTION SUBCUTANEOUS at 12:56

## 2022-01-12 NOTE — PROGRESS NOTE ADULT - SUBJECTIVE AND OBJECTIVE BOX
Patient is a 76y old  Female who presents with a chief complaint of diarrhea (12 Jan 2022 11:20)      INTERVAL HPI/OVERNIGHT EVENTS:overnight events noted    Home Medications:  aspirin 81 mg oral tablet: orally once a day (10 Yannick 2022 20:57)  famotidine 40 mg oral tablet: 1 tab(s) orally 2 times a day (10 Yannick 2022 20:57)  furosemide 20 mg oral tablet: 1 tab(s) orally once a day (10 Yannick 2022 20:57)  Metoprolol Succinate  mg oral tablet, extended release: 1 tab(s) orally once a day (at bedtime) (10 Yannick 2022 20:57)  prasugrel 5 mg oral tablet: 1 tab(s) orally once a day (10 Yannick 2022 20:57)  ranolazine 500 mg oral tablet, extended release: 1 tab(s) orally 2 times a day (10 Yannick 2022 20:57)  rosuvastatin 20 mg oral tablet: 1 tab(s) orally once a day (at bedtime) (10 Yannick 2022 20:57)      MEDICATIONS  (STANDING):  aspirin  chewable 81 milliGRAM(s) Oral daily  atorvastatin 80 milliGRAM(s) Oral at bedtime  enoxaparin Injectable 40 milliGRAM(s) SubCutaneous daily  famotidine    Tablet 40 milliGRAM(s) Oral two times a day  metoprolol succinate  milliGRAM(s) Oral daily  prasugrel 5 milliGRAM(s) Oral daily  ranolazine 500 milliGRAM(s) Oral two times a day  sodium chloride 0.9%. 1000 milliLiter(s) (75 mL/Hr) IV Continuous <Continuous>    MEDICATIONS  (PRN):  ipratropium    for Nebulization 500 MICROGram(s) Nebulizer every 6 hours PRN Bronchospasm  ondansetron Injectable 4 milliGRAM(s) IV Push every 4 hours PRN Nausea and/or Vomiting      Allergies    adhesives (Rash)  No Known Drug Allergies    Intolerances        REVIEW OF SYSTEMS:  CONSTITUTIONAL: No fever, weight loss, or fatigue  EYES: No eye pain, visual disturbances, or discharge  ENMT:  No difficulty hearing, tinnitus, vertigo; No sinus or throat pain  NECK: No pain or stiffness  BREASTS: No pain, masses, or nipple discharge  RESPIRATORY: No cough, wheezing, chills or hemoptysis; No shortness of breath  CARDIOVASCULAR: No chest pain, palpitations, dizziness, or leg swelling  GASTROINTESTINAL: No abdominal or epigastric pain. No nausea, vomiting, or hematemesis; No diarrhea , passing flatus  GENITOURINARY: No dysuria, frequency, hematuria, or incontinence  NEUROLOGICAL: No headaches, memory loss, loss of strength, numbness, or tremors  SKIN: No itching, burning, rashes, or lesions   LYMPH NODES: No enlarged glands  ENDOCRINE: No heat or cold intolerance; No hair loss  MUSCULOSKELETAL: No joint pain or swelling; No muscle, back, or extremity pain  PSYCHIATRIC: No depression, anxiety, mood swings, or difficulty sleeping  HEME/LYMPH: No easy bruising, or bleeding gums  ALLERGY AND IMMUNOLOGIC: No hives or eczema    Vital Signs Last 24 Hrs  T(C): 36.5 (12 Jan 2022 08:05), Max: 36.8 (11 Jan 2022 15:00)  T(F): 97.7 (12 Jan 2022 08:05), Max: 98.2 (11 Jan 2022 15:00)  HR: 68 (12 Jan 2022 08:05) (68 - 72)  BP: 128/73 (12 Jan 2022 08:05) (113/75 - 145/60)  BP(mean): --  RR: 18 (12 Jan 2022 08:05) (18 - 18)  SpO2: 96% (12 Jan 2022 08:05) (92% - 96%)    PHYSICAL EXAM:  GENERAL: NAD, well-groomed, well-developed  HEAD:  Atraumatic, Normocephalic  EYES: EOMI, PERRLA, conjunctiva and sclera clear  ENMT: Moist mucous membranes,   NECK: Supple, No JVD, Normal thyroid  NERVOUS SYSTEM:  Alert & Oriented X3, Good concentration; Motor Strength 5/5 B/L upper and lower extremities; DTRs 2+ intact and symmetric  CHEST/LUNG: Clear to percussion bilaterally; No rales, rhonchi, wheezing, or rubs  HEART: Regular rate and rhythm; No murmurs, rubs, or gallops  ABDOMEN: Soft, Nontender, Nondistended; Bowel sounds present  EXTREMITIES:  2+ Peripheral Pulses, No clubbing, cyanosis, or edema  LYMPH: No lymphadenopathy noted  SKIN: No rashes or lesions    LABS:                        10.5   4.54  )-----------( 258      ( 12 Jan 2022 06:32 )             34.0     01-12    140  |  107  |  15  ----------------------------<  84  3.5   |  27  |  0.66    Ca    7.9<L>      12 Jan 2022 06:32    TPro  6.4  /  Alb  2.5<L>  /  TBili  0.2  /  DBili  x   /  AST  15  /  ALT  12  /  AlkPhos  69  01-12        CAPILLARY BLOOD GLUCOSE              I&O's Summary      RADIOLOGY & ADDITIONAL TESTS:    Imaging Personally Reviewed:  [x ] YES  [ ] NO    Consultant(s) Notes Reviewed:  [x ] YES  [ ] NO    Care Discussed with Consultants/Other Providers [x ] YES  [ ] NO

## 2022-01-12 NOTE — PROGRESS NOTE ADULT - SUBJECTIVE AND OBJECTIVE BOX
Chief Complaint: Abdominal pain    Interval Events: No events overnight.    Review of Systems:  General: No fevers, chills, weight gain  Skin: No rashes, color changes  Cardiovascular: No chest pain, orthopnea  Respiratory: No shortness of breath, cough  Gastrointestinal: No nausea, abdominal pain  Genitourinary: No incontinence, pain with urination  Musculoskeletal: No pain, swelling, decreased range of motion  Neurological: No headache, weakness  Psychiatric: No depression, anxiety  Endocrine: No weight gain, increased thirst  All other systems are comprehensively negative.    Physical Exam:  Vitals:        Vital Signs Last 24 Hrs  T(C): 36.5 (12 Jan 2022 08:05), Max: 36.8 (11 Jan 2022 15:00)  T(F): 97.7 (12 Jan 2022 08:05), Max: 98.2 (11 Jan 2022 15:00)  HR: 68 (12 Jan 2022 08:05) (68 - 72)  BP: 128/73 (12 Jan 2022 08:05) (113/75 - 145/60)  BP(mean): --  RR: 18 (12 Jan 2022 08:05) (18 - 18)  SpO2: 96% (12 Jan 2022 08:05) (92% - 96%)  General: NAD  HEENT: MMM  Neck: No JVD, no carotid bruit  Lungs: CTAB  CV: RRR, nl S1/S2, no M/R/G  Abdomen: S/NT/ND, +BS  Extremities: No LE edema, no cyanosis  Neuro: AAOx3, non-focal  Skin: No rash    Labs:                        10.5   4.54  )-----------( 258      ( 12 Jan 2022 06:32 )             34.0     01-12    140  |  107  |  15  ----------------------------<  84  3.5   |  27  |  0.66    Ca    7.9<L>      12 Jan 2022 06:32    TPro  6.4  /  Alb  2.5<L>  /  TBili  0.2  /  DBili  x   /  AST  15  /  ALT  12  /  AlkPhos  69  01-12

## 2022-01-12 NOTE — PROGRESS NOTE ADULT - SUBJECTIVE AND OBJECTIVE BOX
INTERVAL HPI/OVERNIGHT EVENTS:  No new overnight event.  No N/V/D.  passing gas s/p sbs stenosis still there less distended  had a colonosocpy 6 yeards ago normal as per patient    Allergies    adhesives (Rash)  No Known Drug Allergies    Intolerances          General:  No wt loss, fevers, chills, night sweats, fatigue,   Eyes:  Good vision, no reported pain  ENT:  No sore throat, pain, runny nose, dysphagia  CV:  No pain, palpitations, hypo/hypertension  Resp:  No dyspnea, cough, tachypnea, wheezing  GI:  No pain, No nausea, No vomiting, No diarrhea, No constipation, No weight loss, No fever, No pruritis, No rectal bleeding, No tarry stools, No dysphagia,  :  No pain, bleeding, incontinence, nocturia  Muscle:  No pain, weakness  Neuro:  No weakness, tingling, memory problems  Psych:  No fatigue, insomnia, mood problems, depression  Endocrine:  No polyuria, polydipsia, cold/heat intolerance  Heme:  No petechiae, ecchymosis, easy bruisability  Skin:  No rash, tattoos, scars, edema      PHYSICAL EXAM:   Vital Signs:  Vital Signs Last 24 Hrs  T(C): 36.5 (12 Jan 2022 08:05), Max: 36.8 (11 Jan 2022 15:00)  T(F): 97.7 (12 Jan 2022 08:05), Max: 98.2 (11 Jan 2022 15:00)  HR: 68 (12 Jan 2022 08:05) (68 - 72)  BP: 128/73 (12 Jan 2022 08:05) (113/75 - 145/60)  BP(mean): --  RR: 18 (12 Jan 2022 08:05) (18 - 18)  SpO2: 96% (12 Jan 2022 08:05) (92% - 96%)  Daily     Daily I&O's Summary      GENERAL:  Appears stated age, well-groomed, well-nourished, no distress  HEENT:  NC/AT,  conjunctivae clear and pink, no thyromegaly, nodules, adenopathy, no JVD, sclera -anicteric  CHEST:  Full & symmetric excursion, no increased effort, breath sounds clear  HEART:  Regular rhythm, S1, S2, no murmur/rub/S3/S4, no abdominal bruit, no edema  ABDOMEN:  Soft, non-tender, non-distended, normoactive bowel sounds,  no masses ,no hepato-splenomegaly, no signs of chronic liver disease  EXTEREMITIES:  no cyanosis,clubbing or edema  SKIN:  No rash/erythema/ecchymoses/petechiae/wounds/abscess/warm/dry  NEURO:  Alert, oriented, no asterixis, no tremor, no encephalopathy      LABS:                        10.5   4.54  )-----------( 258      ( 12 Jan 2022 06:32 )             34.0     01-12    140  |  107  |  15  ----------------------------<  84  3.5   |  27  |  0.66    Ca    7.9<L>      12 Jan 2022 06:32    TPro  6.4  /  Alb  2.5<L>  /  TBili  0.2  /  DBili  x   /  AST  15  /  ALT  12  /  AlkPhos  69  01-12        amylase   lipaseLipase, Serum: 45 U/L (01-10 @ 16:35)    RADIOLOGY & ADDITIONAL TESTS:

## 2022-01-12 NOTE — PROGRESS NOTE ADULT - SUBJECTIVE AND OBJECTIVE BOX
Date/Time Patient Seen:  		  Referring MD:   Data Reviewed	       Patient is a 76y old  Female who presents with a chief complaint of enteritis (11 Jan 2022 10:48)      Subjective/HPI     PAST MEDICAL & SURGICAL HISTORY:  Renal colic    Malignant neoplasm of cervix, unspecified site  s/p ROBERTO 1978    Smoker  1/2 pack X 55 years    HTN (hypertension)  diagnosed 10/2017    Obesity    Emphysema lung  mild, on CT scan 2017    Proteinuria    Renal calculus, bilateral    Morbid obesity with BMI of 40.0-44.9, adult    TYLOR (obstructive sleep apnea)  as per [facility] criteria; Did not complete sleep study yet    Lumbar herniated disc    Spinal stenosis of lumbar region    CAD (coronary artery disease)  1 stent (D1) - 4/21 - Dr. Metcalf    Chronic bronchitis    Cataract    History of unstable angina  last admission [facility]/27/2021 at [facility]   prompted left cardiac cath with coronary artery stenting (LAD)   on prasugrel   and on aspirin   last nitro since april 2021  cath done by Dr. Dwight Metcalf    Bulging lumbar disc    History of sciatica    History of spinal stenosis  dx 2008    Snores  sleep study 6/2021, no result    Calculus of kidney    H/O heart failure  last admission 2019  EF 50% (4/2021)    History of blood transfusion    History of appendectomy  1954    H/O abdominal hysterectomy  [facility] 1978  cervical cancer   no radiation no chemo    H/O lithotripsy  [facility] 1986 left, 2004 right, 2007 left    S/P breast lumpectomy  left, benign 1998    Prolapse, uterovaginal  s/p TVT Sling 2000    S/P cholecystectomy  2005    S/P ERCP  2006    S/P cataract surgery  bilateral, 2015    S/P CABG x 4  5/31/2019, Dr. Goncalves at [facility]    S/P colonoscopy with polypectomy    Nephrostomy status  percutaneous nephrolithotomy with nephrostomy tube for removal of right kidney stone (2017), left nephrostomy tube placement 8/5/2021          Medication list         MEDICATIONS  (STANDING):  aspirin  chewable 81 milliGRAM(s) Oral daily  atorvastatin 80 milliGRAM(s) Oral at bedtime  enoxaparin Injectable 40 milliGRAM(s) SubCutaneous daily  famotidine    Tablet 40 milliGRAM(s) Oral two times a day  metoprolol succinate  milliGRAM(s) Oral daily  prasugrel 5 milliGRAM(s) Oral daily  ranolazine 500 milliGRAM(s) Oral two times a day  sodium chloride 0.9%. 1000 milliLiter(s) (75 mL/Hr) IV Continuous <Continuous>    MEDICATIONS  (PRN):  ipratropium    for Nebulization 500 MICROGram(s) Nebulizer every 6 hours PRN Bronchospasm  ondansetron Injectable 4 milliGRAM(s) IV Push every 4 hours PRN Nausea and/or Vomiting         Vitals log        ICU Vital Signs Last 24 Hrs  T(C): 36.7 (11 Jan 2022 23:02), Max: 36.9 (11 Jan 2022 07:05)  T(F): 98 (11 Jan 2022 23:02), Max: 98.4 (11 Jan 2022 07:05)  HR: 70 (11 Jan 2022 23:02) (70 - 72)  BP: 145/60 (11 Jan 2022 23:02) (113/75 - 145/60)  BP(mean): --  ABP: --  ABP(mean): --  RR: 18 (11 Jan 2022 23:02) (18 - 18)  SpO2: 92% (11 Jan 2022 23:02) (92% - 96%)           Input and Output:  I&O's Detail      Lab Data                        11.8   5.90  )-----------( 326      ( 10 Yannick 2022 16:35 )             37.5     01-10    137  |  102  |  20  ----------------------------<  112<H>  3.8   |  28  |  1.04    Ca    8.7      10 Yannick 2022 16:35    TPro  7.5  /  Alb  3.0<L>  /  TBili  0.3  /  DBili  x   /  AST  19  /  ALT  <10<L>  /  AlkPhos  82  01-10            Review of Systems	      Objective     Physical Examination  heart s1s2  lung dec BS  abd soft  head nc        Pertinent Lab findings & Imaging      [place]:  NO   Adequate UO     I&O's Detail           Discussed with:     Cultures:	        Radiology

## 2022-01-12 NOTE — PROGRESS NOTE ADULT - SUBJECTIVE AND OBJECTIVE BOX
SURGERY NOTE PA  Pt is a 77 y/o f who presents on 1/10 to [place] ED, c/o watery nonbloody diarrhea x 3 days. Reports that she ate sushi Friday 1/7, symptoms started the next day but mild, then increased.  Patient tried immodium without relief. Denies recent abx use. Has been eating bland diet.     SUBJECTIVE:   Patient seen at bedside, no overnight events, no complaints noted and pain is controlled at this time.   Patient admits to gassiness and flatus, watery diarrhea yesterday 1/11.   Patient denies any headaches, chest pain, shortness of breath, nausea, vomiting, fever, chills, weakness, dysuria  Patient A+Ox3 in NAD at time of visit.    OBJECTIVE:   T(C): 36.5 (01-12-22 @ 08:05), Max: 36.8 (01-11-22 @ 15:00)  HR: 68 (01-12-22 @ 08:05) (68 - 72)  BP: 128/73 (01-12-22 @ 08:05) (113/75 - 145/60)  RR: 18 (01-12-22 @ 08:05) (18 - 18)  SpO2: 96% (01-12-22 @ 08:05) (92% - 96%)  CAPILLARY BLOOD GLUCOSE  I&O's Detail    Physical exam:  General: A+O x 3 in NAD  Chest: Clear through auscultation bilaterally, No rales, rhonchi wheezes noted bilaterally  Heart: S1,S1 RRR, no murmurs noted  Abdomen: soft non distended, LLQ tenderness, BS x 4  Extremities: no edema noted, warm,  no calf tenderness     MEDICATIONS  (STANDING):  aspirin  chewable 81 milliGRAM(s) Oral daily  atorvastatin 80 milliGRAM(s) Oral at bedtime  enoxaparin Injectable 40 milliGRAM(s) SubCutaneous daily  famotidine    Tablet 40 milliGRAM(s) Oral two times a day  metoprolol succinate  milliGRAM(s) Oral daily  prasugrel 5 milliGRAM(s) Oral daily  ranolazine 500 milliGRAM(s) Oral two times a day  sodium chloride 0.9%. 1000 milliLiter(s) (75 mL/Hr) IV Continuous <Continuous>    MEDICATIONS  (PRN):  ipratropium    for Nebulization 500 MICROGram(s) Nebulizer every 6 hours PRN Bronchospasm  ondansetron Injectable 4 milliGRAM(s) IV Push every 4 hours PRN Nausea and/or Vomiting      LABS:                        10.5   4.54  )-----------( 258      ( 12 Jan 2022 06:32 )             34.0     01-12    140  |  107  |  15  ----------------------------<  84  3.5   |  27  |  0.66    Ca    7.9<L>      12 Jan 2022 06:32    TPro  6.4  /  Alb  2.5<L>  /  TBili  0.2  /  DBili  x   /  AST  15  /  ALT  12  /  AlkPhos  69  01-12    RADIOLOGY & ADDITIONAL STUDIES:  Today 1/12  IMPRESSION:  No complete or high grade obstruction as contrast material is now   entirely located within the colon, reaching the rectum.    Mildly prominent loops of small bowel are noted within the mid to lower   abdomen, similar to prior studies. Differential again includes ileus   versus partial obstruction.    Assessment  Patient is a 76y old  Female who presents with a chief complaint of diarrhea (12 Jan 2022 08:37)        Plan      Surgical Team Contact Information     SURGERY NOTE PA  Pt is a 75 y/o f who presents on 1/10 to [place] ED, c/o watery nonbloody diarrhea x 3 days. Reports that she ate sushi Friday 1/7, symptoms started the next day but mild, then increased.  Patient tried immodium without relief. Denies recent abx use. Has been eating bland diet.     SUBJECTIVE:   Patient seen at bedside, no overnight events, no complaints noted and pain is controlled at this time.   Patient admits to gassiness and flatus, watery diarrhea yesterday 1/11.   Patient denies any headaches, chest pain, shortness of breath, nausea, vomiting, fever, chills, weakness, dysuria  Patient A+Ox3 in NAD at time of visit.    OBJECTIVE:   T(C): 36.5 (01-12-22 @ 08:05), Max: 36.8 (01-11-22 @ 15:00)  HR: 68 (01-12-22 @ 08:05) (68 - 72)  BP: 128/73 (01-12-22 @ 08:05) (113/75 - 145/60)  RR: 18 (01-12-22 @ 08:05) (18 - 18)  SpO2: 96% (01-12-22 @ 08:05) (92% - 96%)  CAPILLARY BLOOD GLUCOSE  I&O's Detail    Physical exam:  General: A+O x 3 in NAD  Chest: Clear through auscultation bilaterally, No rales, rhonchi wheezes noted bilaterally  Heart: S1,S1 RRR, no murmurs noted  Abdomen: soft non distended, LLQ tenderness, BS x 4  Extremities: no edema noted, warm,  no calf tenderness     MEDICATIONS  (STANDING):  aspirin  chewable 81 milliGRAM(s) Oral daily  atorvastatin 80 milliGRAM(s) Oral at bedtime  enoxaparin Injectable 40 milliGRAM(s) SubCutaneous daily  famotidine    Tablet 40 milliGRAM(s) Oral two times a day  metoprolol succinate  milliGRAM(s) Oral daily  prasugrel 5 milliGRAM(s) Oral daily  ranolazine 500 milliGRAM(s) Oral two times a day  sodium chloride 0.9%. 1000 milliLiter(s) (75 mL/Hr) IV Continuous <Continuous>    MEDICATIONS  (PRN):  ipratropium    for Nebulization 500 MICROGram(s) Nebulizer every 6 hours PRN Bronchospasm  ondansetron Injectable 4 milliGRAM(s) IV Push every 4 hours PRN Nausea and/or Vomiting      LABS:                        10.5   4.54  )-----------( 258      ( 12 Jan 2022 06:32 )             34.0     01-12    140  |  107  |  15  ----------------------------<  84  3.5   |  27  |  0.66    Ca    7.9<L>      12 Jan 2022 06:32    TPro  6.4  /  Alb  2.5<L>  /  TBili  0.2  /  DBili  x   /  AST  15  /  ALT  12  /  AlkPhos  69  01-12    RADIOLOGY & ADDITIONAL STUDIES:  Today 1/12  IMPRESSION:  No complete or high grade obstruction as contrast material is now   entirely located within the colon, reaching the rectum.    Mildly prominent loops of small bowel are noted within the mid to lower   abdomen, similar to prior studies. Differential again includes ileus   versus partial obstruction.    Assessment/Plan  Patient is a 76y old  Female who presents with a chief complaint of diarrhea (12 Jan 2022 08:37)  Prasugrel, aspirin, lovenox  IVF NaCl at 75  Famotidine  Zofran  VS  Encourage incentive spirometer  Encourage ambulation  No objection to advancing diet/starting clears per medicine/GI - currently NPO  WBC nl  If enteritis - then out patient CT enterography  Appreciate GI   Discussed with Dr. Arteaga

## 2022-01-13 LAB
ALBUMIN SERPL ELPH-MCNC: 2.7 G/DL — LOW (ref 3.3–5)
ALP SERPL-CCNC: 79 U/L — SIGNIFICANT CHANGE UP (ref 30–120)
ALT FLD-CCNC: 17 U/L DA — SIGNIFICANT CHANGE UP (ref 10–60)
ANION GAP SERPL CALC-SCNC: 7 MMOL/L — SIGNIFICANT CHANGE UP (ref 5–17)
AST SERPL-CCNC: 25 U/L — SIGNIFICANT CHANGE UP (ref 10–40)
BASOPHILS # BLD AUTO: 0.02 K/UL — SIGNIFICANT CHANGE UP (ref 0–0.2)
BASOPHILS NFR BLD AUTO: 0.3 % — SIGNIFICANT CHANGE UP (ref 0–2)
BILIRUB SERPL-MCNC: 0.5 MG/DL — SIGNIFICANT CHANGE UP (ref 0.2–1.2)
BUN SERPL-MCNC: 10 MG/DL — SIGNIFICANT CHANGE UP (ref 7–23)
CALCIUM SERPL-MCNC: 8.1 MG/DL — LOW (ref 8.4–10.5)
CHLORIDE SERPL-SCNC: 106 MMOL/L — SIGNIFICANT CHANGE UP (ref 96–108)
CO2 SERPL-SCNC: 27 MMOL/L — SIGNIFICANT CHANGE UP (ref 22–31)
CREAT SERPL-MCNC: 0.75 MG/DL — SIGNIFICANT CHANGE UP (ref 0.5–1.3)
CULTURE RESULTS: SIGNIFICANT CHANGE UP
EOSINOPHIL # BLD AUTO: 0.12 K/UL — SIGNIFICANT CHANGE UP (ref 0–0.5)
EOSINOPHIL NFR BLD AUTO: 2.1 % — SIGNIFICANT CHANGE UP (ref 0–6)
GLUCOSE SERPL-MCNC: 98 MG/DL — SIGNIFICANT CHANGE UP (ref 70–99)
HCT VFR BLD CALC: 34.9 % — SIGNIFICANT CHANGE UP (ref 34.5–45)
HGB BLD-MCNC: 11.4 G/DL — LOW (ref 11.5–15.5)
IMM GRANULOCYTES NFR BLD AUTO: 0.3 % — SIGNIFICANT CHANGE UP (ref 0–1.5)
LYMPHOCYTES # BLD AUTO: 1.97 K/UL — SIGNIFICANT CHANGE UP (ref 1–3.3)
LYMPHOCYTES # BLD AUTO: 33.7 % — SIGNIFICANT CHANGE UP (ref 13–44)
MCHC RBC-ENTMCNC: 29.8 PG — SIGNIFICANT CHANGE UP (ref 27–34)
MCHC RBC-ENTMCNC: 32.7 GM/DL — SIGNIFICANT CHANGE UP (ref 32–36)
MCV RBC AUTO: 91.4 FL — SIGNIFICANT CHANGE UP (ref 80–100)
MONOCYTES # BLD AUTO: 0.45 K/UL — SIGNIFICANT CHANGE UP (ref 0–0.9)
MONOCYTES NFR BLD AUTO: 7.7 % — SIGNIFICANT CHANGE UP (ref 2–14)
NEUTROPHILS # BLD AUTO: 3.27 K/UL — SIGNIFICANT CHANGE UP (ref 1.8–7.4)
NEUTROPHILS NFR BLD AUTO: 55.9 % — SIGNIFICANT CHANGE UP (ref 43–77)
NRBC # BLD: 0 /100 WBCS — SIGNIFICANT CHANGE UP (ref 0–0)
PLATELET # BLD AUTO: 180 K/UL — SIGNIFICANT CHANGE UP (ref 150–400)
POTASSIUM SERPL-MCNC: 4.1 MMOL/L — SIGNIFICANT CHANGE UP (ref 3.5–5.3)
POTASSIUM SERPL-SCNC: 4.1 MMOL/L — SIGNIFICANT CHANGE UP (ref 3.5–5.3)
PROT SERPL-MCNC: 6.9 G/DL — SIGNIFICANT CHANGE UP (ref 6–8.3)
RBC # BLD: 3.82 M/UL — SIGNIFICANT CHANGE UP (ref 3.8–5.2)
RBC # FLD: 14.7 % — HIGH (ref 10.3–14.5)
SODIUM SERPL-SCNC: 140 MMOL/L — SIGNIFICANT CHANGE UP (ref 135–145)
SPECIMEN SOURCE: SIGNIFICANT CHANGE UP
WBC # BLD: 5.85 K/UL — SIGNIFICANT CHANGE UP (ref 3.8–10.5)
WBC # FLD AUTO: 5.85 K/UL — SIGNIFICANT CHANGE UP (ref 3.8–10.5)

## 2022-01-13 RX ORDER — NYSTATIN CREAM 100000 [USP'U]/G
1 CREAM TOPICAL THREE TIMES A DAY
Refills: 0 | Status: DISCONTINUED | OUTPATIENT
Start: 2022-01-13 | End: 2022-01-15

## 2022-01-13 RX ADMIN — ATORVASTATIN CALCIUM 80 MILLIGRAM(S): 80 TABLET, FILM COATED ORAL at 22:19

## 2022-01-13 RX ADMIN — NYSTATIN CREAM 1 APPLICATION(S): 100000 CREAM TOPICAL at 13:44

## 2022-01-13 RX ADMIN — ENOXAPARIN SODIUM 40 MILLIGRAM(S): 100 INJECTION SUBCUTANEOUS at 13:44

## 2022-01-13 RX ADMIN — RANOLAZINE 500 MILLIGRAM(S): 500 TABLET, FILM COATED, EXTENDED RELEASE ORAL at 09:35

## 2022-01-13 RX ADMIN — Medication 100 MILLIGRAM(S): at 22:19

## 2022-01-13 RX ADMIN — FAMOTIDINE 40 MILLIGRAM(S): 10 INJECTION INTRAVENOUS at 09:34

## 2022-01-13 RX ADMIN — FAMOTIDINE 40 MILLIGRAM(S): 10 INJECTION INTRAVENOUS at 22:18

## 2022-01-13 RX ADMIN — PRASUGREL 5 MILLIGRAM(S): 5 TABLET, FILM COATED ORAL at 13:43

## 2022-01-13 RX ADMIN — NYSTATIN CREAM 1 APPLICATION(S): 100000 CREAM TOPICAL at 22:18

## 2022-01-13 RX ADMIN — Medication 81 MILLIGRAM(S): at 13:43

## 2022-01-13 RX ADMIN — RANOLAZINE 500 MILLIGRAM(S): 500 TABLET, FILM COATED, EXTENDED RELEASE ORAL at 22:19

## 2022-01-13 NOTE — DIETITIAN INITIAL EVALUATION ADULT. - CONTINUE CURRENT NUTRITION CARE PLAN
Pt last saw PCP 10/17/19 to establish care. Pending appt 3/24/20 CPE.    glyBURIDE (DIABETA) 5 MG tablets last ordered 8/16/19 #180 tablets R1  Take 1 tablet by mouth 2 times daily (with meals).    Per last office visit notes:   \"He has Type 2 DM. He takes Metformin 1000 mg BID, Glyburide 5 mg BID and Lantus 28 units QHS...\"    \"ASSESSMENT AND PLAN:  1. Type 2 diabetes mellitus with hyperglycemia, with long-term current use of insulin (CMS/[facility])  -currently not well controlled, recent Hgb A1c 8.1  -medications: continue on Metformin 1000 mg BID, Glyburide 5 mg BID and Lantus 28 units nightly\"    A1C was then re-checked 2/13/20- changes were recommended for pt's Lantus but no change in glyburide dosing recommended.     Refill provided adhering to standing orders.  
yes

## 2022-01-13 NOTE — PROGRESS NOTE ADULT - SUBJECTIVE AND OBJECTIVE BOX
Patient is a 76y old  Female who presents with a chief complaint of diarrhea (13 Jan 2022 11:13)      INTERVAL HPI/OVERNIGHT EVENTS:overnight events noted    Home Medications:  aspirin 81 mg oral tablet: orally once a day (10 Yannick 2022 20:57)  famotidine 40 mg oral tablet: 1 tab(s) orally 2 times a day (10 Yannick 2022 20:57)  furosemide 20 mg oral tablet: 1 tab(s) orally once a day (10 Yannick 2022 20:57)  Metoprolol Succinate  mg oral tablet, extended release: 1 tab(s) orally once a day (at bedtime) (10 Yannick 2022 20:57)  prasugrel 5 mg oral tablet: 1 tab(s) orally once a day (10 Yannick 2022 20:57)  ranolazine 500 mg oral tablet, extended release: 1 tab(s) orally 2 times a day (10 Yannick 2022 20:57)  rosuvastatin 20 mg oral tablet: 1 tab(s) orally once a day (at bedtime) (10 Yannick 2022 20:57)      MEDICATIONS  (STANDING):  aspirin  chewable 81 milliGRAM(s) Oral daily  atorvastatin 80 milliGRAM(s) Oral at bedtime  enoxaparin Injectable 40 milliGRAM(s) SubCutaneous daily  famotidine    Tablet 40 milliGRAM(s) Oral two times a day  metoprolol succinate  milliGRAM(s) Oral daily  prasugrel 5 milliGRAM(s) Oral daily  ranolazine 500 milliGRAM(s) Oral two times a day  sodium chloride 0.9%. 1000 milliLiter(s) (75 mL/Hr) IV Continuous <Continuous>    MEDICATIONS  (PRN):  ipratropium    for Nebulization 500 MICROGram(s) Nebulizer every 6 hours PRN Bronchospasm  ondansetron Injectable 4 milliGRAM(s) IV Push every 4 hours PRN Nausea and/or Vomiting      Allergies    adhesives (Rash)  No Known Drug Allergies    Intolerances        REVIEW OF SYSTEMS:  CONSTITUTIONAL: No fever, weight loss, or fatigue  EYES: No eye pain, visual disturbances, or discharge  ENMT:  No difficulty hearing, tinnitus, vertigo; No sinus or throat pain  NECK: No pain or stiffness  BREASTS: No pain, masses, or nipple discharge  RESPIRATORY: No cough, wheezing, chills or hemoptysis; No shortness of breath  CARDIOVASCULAR: No chest pain, palpitations, dizziness, or leg swelling  GASTROINTESTINAL: No abdominal or epigastric pain. No nausea, vomiting, or hematemesis;loose stool, feeling hungry  GENITOURINARY: No dysuria, frequency, hematuria, or incontinence  NEUROLOGICAL: No headaches, memory loss, loss of strength, numbness, or tremors  SKIN: itchy rash in abdominal fold  LYMPH NODES: No enlarged glands  ENDOCRINE: No heat or cold intolerance; No hair loss  MUSCULOSKELETAL: No joint pain or swelling; No muscle, back, or extremity pain  PSYCHIATRIC: No depression, anxiety, mood swings, or difficulty sleeping  HEME/LYMPH: No easy bruising, or bleeding gums  ALLERGY AND IMMUNOLOGIC: No hives or eczema    Vital Signs Last 24 Hrs  T(C): 36.7 (13 Jan 2022 07:55), Max: 37.1 (12 Jan 2022 15:00)  T(F): 98 (13 Jan 2022 07:55), Max: 98.7 (12 Jan 2022 15:00)  HR: 69 (13 Jan 2022 07:55) (69 - 105)  BP: 146/65 (13 Jan 2022 07:55) (146/65 - 157/77)  BP(mean): --  RR: 16 (13 Jan 2022 07:55) (16 - 16)  SpO2: 94% (13 Jan 2022 07:55) (94% - 98%)    PHYSICAL EXAM:  GENERAL:  well-groomed, well-developed  HEAD:  Atraumatic, Normocephalic  EYES: EOMI, PERRLA, conjunctiva and sclera clear  ENMT: Moist mucous membranes,   NECK: Supple, No JVD, Normal thyroid  NERVOUS SYSTEM:  Alert & Oriented X3, Good concentration; Motor Strength 5/5 B/L upper and lower extremities; DTRs 2+ intact and symmetric  CHEST/LUNG: Clear to percussion bilaterally; No rales, rhonchi, wheezing, or rubs  HEART: Regular rate and rhythm; No murmurs, rubs, or gallops  ABDOMEN: Soft, Nontender, Nondistended; Bowel sounds present  EXTREMITIES:  2+ Peripheral Pulses, No clubbing, cyanosis, or edema  LYMPH: No lymphadenopathy noted  SKIN: red macular rash in abdominal fold    LABS:                        11.4   5.85  )-----------( 180      ( 13 Jan 2022 06:33 )             34.9     01-13    140  |  106  |  10  ----------------------------<  98  4.1   |  27  |  0.75    Ca    8.1<L>      13 Jan 2022 06:33    TPro  6.9  /  Alb  2.7<L>  /  TBili  0.5  /  DBili  x   /  AST  25  /  ALT  17  /  AlkPhos  79  01-13        CAPILLARY BLOOD GLUCOSE              I&O's Summary      RADIOLOGY & ADDITIONAL TESTS:    Imaging Personally Reviewed:  [x ] YES  [ ] NO    Consultant(s) Notes Reviewed:  [x ] YES  [ ] NO    Care Discussed with Consultants/Other Providers [ x] YES  [ ] NO

## 2022-01-13 NOTE — PROGRESS NOTE ADULT - SUBJECTIVE AND OBJECTIVE BOX
Date/Time Patient Seen:  		  Referring MD:   Data Reviewed	       Patient is a 76y old  Female who presents with a chief complaint of diarrhea (12 Jan 2022 12:26)      Subjective/HPI     PAST MEDICAL & SURGICAL HISTORY:  Renal colic    Malignant neoplasm of cervix, unspecified site  s/p ROBERTO 1978    Smoker  1/2 pack X 55 years    HTN (hypertension)  diagnosed 10/2017    Obesity    Emphysema lung  mild, on CT scan 2017    Proteinuria    Renal calculus, bilateral    Morbid obesity with BMI of 40.0-44.9, adult    TYLOR (obstructive sleep apnea)  as per [facility] criteria; Did not complete sleep study yet    Lumbar herniated disc    Spinal stenosis of lumbar region    CAD (coronary artery disease)  1 stent (D1) - 4/21 - Dr. Metcalf    Chronic bronchitis    Cataract    History of unstable angina  last admission 4/27/2021 at [facility]   prompted left cardiac cath with coronary artery stenting (LAD)   on prasugrel   and on aspirin   last nitro since april 2021  cath done by Dr. Dwight Metcalf    Bulging lumbar disc    History of sciatica    History of spinal stenosis  dx 2008    Snores  sleep study 6/2021, no result    Calculus of kidney    H/O heart failure  last admission 2019  EF 50% (4/2021)    History of blood transfusion    History of appendectomy  1954    H/O abdominal hysterectomy  [facility] 1978  cervical cancer   no radiation no chemo    H/O lithotripsy  [facility] 1986 left, 2004 right, 2007 left    S/P breast lumpectomy  left, benign 1998    Prolapse, uterovaginal  s/p TVT Sling 2000    S/P cholecystectomy  2005    S/P ERCP  2006    S/P cataract surgery  bilateral, 2015    S/P CABG x 4  5/31/2019, Dr. Goncalves at [facility]    S/P colonoscopy with polypectomy    Nephrostomy status  percutaneous nephrolithotomy with nephrostomy tube for removal of right kidney stone (2017), left nephrostomy tube placement 8/5/2021          Medication list         MEDICATIONS  (STANDING):  aspirin  chewable 81 milliGRAM(s) Oral daily  atorvastatin 80 milliGRAM(s) Oral at bedtime  enoxaparin Injectable 40 milliGRAM(s) SubCutaneous daily  famotidine    Tablet 40 milliGRAM(s) Oral two times a day  metoprolol succinate  milliGRAM(s) Oral daily  prasugrel 5 milliGRAM(s) Oral daily  ranolazine 500 milliGRAM(s) Oral two times a day  sodium chloride 0.9%. 1000 milliLiter(s) (75 mL/Hr) IV Continuous <Continuous>    MEDICATIONS  (PRN):  ipratropium    for Nebulization 500 MICROGram(s) Nebulizer every 6 hours PRN Bronchospasm  ondansetron Injectable 4 milliGRAM(s) IV Push every 4 hours PRN Nausea and/or Vomiting         Vitals log        ICU Vital Signs Last 24 Hrs  T(C): 36.4 (12 Jan 2022 23:30), Max: 37.1 (12 Jan 2022 15:00)  T(F): 97.6 (12 Jan 2022 23:30), Max: 98.7 (12 Jan 2022 15:00)  HR: 71 (12 Jan 2022 23:30) (68 - 105)  BP: 154/76 (12 Jan 2022 23:30) (128/73 - 157/77)  BP(mean): --  ABP: --  ABP(mean): --  RR: 16 (12 Jan 2022 23:30) (16 - 18)  SpO2: 97% (12 Jan 2022 23:30) (96% - 98%)           Input and Output:  I&O's Detail      Lab Data                        10.5   4.54  )-----------( 258      ( 12 Jan 2022 06:32 )             34.0     01-12    140  |  107  |  15  ----------------------------<  84  3.5   |  27  |  0.66    Ca    7.9<L>      12 Jan 2022 06:32    TPro  6.4  /  Alb  2.5<L>  /  TBili  0.2  /  DBili  x   /  AST  15  /  ALT  12  /  AlkPhos  69  01-12            Review of Systems	      Objective     Physical Examination  heart s1s2  lung dec BS  abd soft        Pertinent Lab findings & Imaging      [place]:  NO   Adequate UO     I&O's Detail           Discussed with:     Cultures:	        Radiology

## 2022-01-13 NOTE — DIETITIAN INITIAL EVALUATION ADULT. - PERTINENT MEDS FT
MEDICATIONS  (STANDING):  aspirin  chewable 81 milliGRAM(s) Oral daily  atorvastatin 80 milliGRAM(s) Oral at bedtime  enoxaparin Injectable 40 milliGRAM(s) SubCutaneous daily  famotidine    Tablet 40 milliGRAM(s) Oral two times a day  metoprolol succinate  milliGRAM(s) Oral daily  prasugrel 5 milliGRAM(s) Oral daily  ranolazine 500 milliGRAM(s) Oral two times a day  sodium chloride 0.9%. 1000 milliLiter(s) (75 mL/Hr) IV Continuous <Continuous>    MEDICATIONS  (PRN):  ipratropium    for Nebulization 500 MICROGram(s) Nebulizer every 6 hours PRN Bronchospasm  ondansetron Injectable 4 milliGRAM(s) IV Push every 4 hours PRN Nausea and/or Vomiting

## 2022-01-13 NOTE — PROGRESS NOTE ADULT - SUBJECTIVE AND OBJECTIVE BOX
SURGERY PA NOTE    77 y/o WF with multiple medical problems admitted with diarrhea, CT findings of SBO, enteritis, SBS series revealed ileus vs partial obstruction, area of stenosis adhesion vs obstruction    SUBJECTIVE:  Patient seen at beside, no overnight events, no complaints at this time.  Reports pain is well-controlled.  Patient admits to tolerating clears, flatus, soft bowel movement x 2, voiding independently, ambulating.  Patient reports appetite and would like some "solid food".  Patient denies chest pain, shortness of breath, headache, dizziness, fever/chills, dysuria, nausea, vomiting, diarrhea.    OBJECTIVE:   T(F): 98 (01-13-22 @ 07:55), Max: 98.7 (01-12-22 @ 15:00)  HR: 69 (01-13-22 @ 07:55) (69 - 105)  BP: 146/65 (01-13-22 @ 07:55) (146/65 - 157/77)  RR: 16 (01-13-22 @ 07:55) (16 - 16)  SpO2: 94% (01-13-22 @ 07:55) (94% - 98%)  CAPILLARY BLOOD GLUCOSE      PHYSICAL EXAM:  General: A+O x 3, NAD, obese  Abdomen: soft, non-distended, non-tender, BS x 4, no guarding, no rebound, no CVA noted    MEDICATIONS  (STANDING):  aspirin  chewable 81 milliGRAM(s) Oral daily  atorvastatin 80 milliGRAM(s) Oral at bedtime  enoxaparin Injectable 40 milliGRAM(s) SubCutaneous daily  famotidine    Tablet 40 milliGRAM(s) Oral two times a day  metoprolol succinate  milliGRAM(s) Oral daily  nystatin Powder 1 Application(s) Topical three times a day  prasugrel 5 milliGRAM(s) Oral daily  ranolazine 500 milliGRAM(s) Oral two times a day  sodium chloride 0.9%. 1000 milliLiter(s) (75 mL/Hr) IV Continuous <Continuous>    MEDICATIONS  (PRN):  ipratropium    for Nebulization 500 MICROGram(s) Nebulizer every 6 hours PRN Bronchospasm  ondansetron Injectable 4 milliGRAM(s) IV Push every 4 hours PRN Nausea and/or Vomiting      LABS:                        11.4   5.85  )-----------( 180      ( 13 Jan 2022 06:33 )             34.9     01-13    140  |  106  |  10  ----------------------------<  98  4.1   |  27  |  0.75    Ca    8.1<L>      13 Jan 2022 06:33    TPro  6.9  /  Alb  2.7<L>  /  TBili  0.5  /  DBili  x   /  AST  25  /  ALT  17  /  AlkPhos  79  01-13          RADIOLOGY & ADDITIONAL STUDIES:  SBS    IMPRESSION:  -Small bowel series demonstrates mild delay of contrast transit through   the mid to distal small bowel. Contrast is noted within the ascending   colon at 4 hour girma. Differential includes ileus versus partial   obstruction.    -Apparent stenotic appearance of a segment of small bowel in the mid to   lower abdomen on the 4 hour delayed images, this could represent the   focus of partial obstruction/adhesions.    AXR 1/12  FINDINGS/  IMPRESSION:  No complete or high grade obstruction as contrast material is now   entirely located within the colon, reaching the rectum.    Mildly prominent loops of small bowel are noted within the mid to lower   abdomen, similar to prior studies. Differential again includes ileus   versus partial obstruction.

## 2022-01-13 NOTE — DIETITIAN INITIAL EVALUATION ADULT. - OTHER INFO
Per H&P, Pt is a "76 F with PMhx of bronchitis, emphysema, TYLOR, CAD (coronary artery disease),ch diastolic, heart failure. 4/27/2021 at [facility], prompted left cardiac cath with coronary artery stenting (LAD) spinal stenosis, Cervical Ca s/p ROBERTO, s/p Appendectomy, s/p cholecystectomy, s/p left lumpectomy, nephrolithiasis, with obstruction and left hydronephrosis, had Left percutaneous nephrolithotomy and stent in 8/21, Left ureteral stent placement. in 11/21 Left ureteroscopy with laser lithotripsy, retrograde pyelogram and ureteral stent exchange, presents on 1/10 to [place] [facility], c/o watery nonbloody diarrhea x 3 days. Reports that she ate sushi friday, symptoms started the next day but mild, then increased. Tried immodium without relief. Denies recent abx use. Has been eating bland diet. Denies f/c, sore throat, cough, CP, SOB, vomiting, urinary complaints. No covid vaccine."    Pt seen for nutrition assessment secondary to NPO/clears x 4 days. CT of abdomen and pelvis on 1/10, findings of moderate dilatation of the proximal small bowel with probable transition point in the R lower quadrant. Findings suggest low to moderate grade small bowel obstruction. Visited pt this afternoon, pt OOB to chair during time of visit. Pt previously NPO x 3 days, diet upgraded yesterday (1/12) to clear liquid diet. Pt reports tolerating clear liquid diet well w/ good po intake. Pt reports feeling hungry and looking forward to eating solid foods again. Pt feeds self, reports no chewing/swallowing difficulties. No food allergies. Denies N/V. Last BM  today (1/13), diarrhea, per pt report. CBW on admission 259#. Pt reports UBW of 260#. R ankle (2+) edema noted. Skin intact. PTA pt reports good appetite, typically consumes 2-3 meals/day. Pt states that she has been eating smaller meal portions and reports feeling full faster. Pt prepares her own meals and occasionally orders take-out, reports limiting her salt intake. Two days PTA pt reports eating very bland foods, following BRAT diet. Takes B12 and vitamin D supplements at home. Pt currently receiving IVFs; NaCl @ 75ml/hr. Recommend pt to remain on clear liquid diet, advance diet when medically appropriate per GI/surgery recommendations. RD to follow-up and will continue to monitor pt's nutritional status.

## 2022-01-13 NOTE — PROGRESS NOTE ADULT - SUBJECTIVE AND OBJECTIVE BOX
INTERVAL HPI/OVERNIGHT EVENTS:  No new overnight event.  No N/V/D.  Tolerating diet.    Allergies    adhesives (Rash)  No Known Drug Allergies    Intolerances    General:  No wt loss, fevers, chills, night sweats, fatigue,   Eyes:  Good vision, no reported pain  ENT:  No sore throat, pain, runny nose, dysphagia  CV:  No pain, palpitations, hypo/hypertension  Resp:  No dyspnea, cough, tachypnea, wheezing  GI:  No pain, No nausea, No vomiting, No diarrhea, No constipation, No weight loss, No fever, No pruritis, No rectal bleeding, No tarry stools, No dysphagia,  :  No pain, bleeding, incontinence, nocturia  Muscle:  No pain, weakness  Neuro:  No weakness, tingling, memory problems  Psych:  No fatigue, insomnia, mood problems, depression  Endocrine:  No polyuria, polydipsia, cold/heat intolerance  Heme:  No petechiae, ecchymosis, easy bruisability  Skin:  No rash, tattoos, scars, edema      PHYSICAL EXAM:   Vital Signs:  Vital Signs Last 24 Hrs  T(C): 36.7 (13 Jan 2022 07:55), Max: 37.1 (12 Jan 2022 15:00)  T(F): 98 (13 Jan 2022 07:55), Max: 98.7 (12 Jan 2022 15:00)  HR: 69 (13 Jan 2022 07:55) (69 - 105)  BP: 146/65 (13 Jan 2022 07:55) (146/65 - 157/77)  BP(mean): --  RR: 16 (13 Jan 2022 07:55) (16 - 16)  SpO2: 94% (13 Jan 2022 07:55) (94% - 98%)  Daily     Daily I&O's Summary      GENERAL:  Appears stated age, well-groomed, well-nourished, no distress  HEENT:  NC/AT,  conjunctivae clear and pink, no thyromegaly, nodules, adenopathy, no JVD, sclera -anicteric  CHEST:  Full & symmetric excursion, no increased effort, breath sounds clear  HEART:  Regular rhythm, S1, S2, no murmur/rub/S3/S4, no abdominal bruit, no edema  ABDOMEN:  Soft, non-tender, non-distended, normoactive bowel sounds,  no masses ,no hepato-splenomegaly, no signs of chronic liver disease  EXTEREMITIES:  no cyanosis,clubbing or edema  SKIN:  No rash/erythema/ecchymoses/petechiae/wounds/abscess/warm/dry  NEURO:  Alert, oriented, no asterixis, no tremor, no encephalopathy      LABS:                        11.4   5.85  )-----------( 180      ( 13 Jan 2022 06:33 )             34.9     01-13    140  |  106  |  10  ----------------------------<  98  4.1   |  27  |  0.75    Ca    8.1<L>      13 Jan 2022 06:33    TPro  6.9  /  Alb  2.7<L>  /  TBili  0.5  /  DBili  x   /  AST  25  /  ALT  17  /  AlkPhos  79  01-13        amylase   lipaseLipase, Serum: 45 U/L (01-10 @ 16:35)    RADIOLOGY & ADDITIONAL TESTS:

## 2022-01-13 NOTE — PROGRESS NOTE ADULT - SUBJECTIVE AND OBJECTIVE BOX
Chief Complaint: Abdominal pain    Interval Events: No events overnight.    Review of Systems:  General: No fevers, chills, weight gain  Skin: No rashes, color changes  Cardiovascular: No chest pain, orthopnea  Respiratory: No shortness of breath, cough  Gastrointestinal: No nausea, abdominal pain  Genitourinary: No incontinence, pain with urination  Musculoskeletal: No pain, swelling, decreased range of motion  Neurological: No headache, weakness  Psychiatric: No depression, anxiety  Endocrine: No weight gain, increased thirst  All other systems are comprehensively negative.    Physical Exam:  Vital Signs Last 24 Hrs  T(C): 36.7 (13 Jan 2022 07:55), Max: 37.1 (12 Jan 2022 15:00)  T(F): 98 (13 Jan 2022 07:55), Max: 98.7 (12 Jan 2022 15:00)  HR: 69 (13 Jan 2022 07:55) (69 - 105)  BP: 146/65 (13 Jan 2022 07:55) (146/65 - 157/77)  BP(mean): --  RR: 16 (13 Jan 2022 07:55) (16 - 16)  SpO2: 94% (13 Jan 2022 07:55) (94% - 98%)  General: NAD  HEENT: MMM  Neck: No JVD, no carotid bruit  Lungs: CTAB  CV: RRR, nl S1/S2, no M/R/G  Abdomen: S/NT/ND, +BS  Extremities: No LE edema, no cyanosis  Neuro: AAOx3, non-focal  Skin: No rash    Labs:             01-13    140  |  106  |  10  ----------------------------<  98  4.1   |  27  |  0.75    Ca    8.1<L>      13 Jan 2022 06:33    TPro  6.9  /  Alb  2.7<L>  /  TBili  0.5  /  DBili  x   /  AST  25  /  ALT  17  /  AlkPhos  79  01-13                        11.4   5.85  )-----------( 180      ( 13 Jan 2022 06:33 )             34.9

## 2022-01-14 LAB
ALBUMIN SERPL ELPH-MCNC: 2.6 G/DL — LOW (ref 3.3–5)
ALP SERPL-CCNC: 86 U/L — SIGNIFICANT CHANGE UP (ref 30–120)
ALT FLD-CCNC: 14 U/L DA — SIGNIFICANT CHANGE UP (ref 10–60)
ANION GAP SERPL CALC-SCNC: 9 MMOL/L — SIGNIFICANT CHANGE UP (ref 5–17)
APPEARANCE UR: ABNORMAL
AST SERPL-CCNC: 22 U/L — SIGNIFICANT CHANGE UP (ref 10–40)
BACTERIA # UR AUTO: NEGATIVE — SIGNIFICANT CHANGE UP
BILIRUB SERPL-MCNC: 0.4 MG/DL — SIGNIFICANT CHANGE UP (ref 0.2–1.2)
BILIRUB UR-MCNC: NEGATIVE — SIGNIFICANT CHANGE UP
BUN SERPL-MCNC: 4 MG/DL — LOW (ref 7–23)
CALCIUM SERPL-MCNC: 7.9 MG/DL — LOW (ref 8.4–10.5)
CHLORIDE SERPL-SCNC: 106 MMOL/L — SIGNIFICANT CHANGE UP (ref 96–108)
CO2 SERPL-SCNC: 26 MMOL/L — SIGNIFICANT CHANGE UP (ref 22–31)
COLOR SPEC: ABNORMAL
CREAT SERPL-MCNC: 0.61 MG/DL — SIGNIFICANT CHANGE UP (ref 0.5–1.3)
DIFF PNL FLD: ABNORMAL
EPI CELLS # UR: SIGNIFICANT CHANGE UP
GLUCOSE SERPL-MCNC: 94 MG/DL — SIGNIFICANT CHANGE UP (ref 70–99)
GLUCOSE UR QL: NEGATIVE MG/DL — SIGNIFICANT CHANGE UP
HCT VFR BLD CALC: 32.4 % — LOW (ref 34.5–45)
HGB BLD-MCNC: 10.4 G/DL — LOW (ref 11.5–15.5)
KETONES UR-MCNC: NEGATIVE — SIGNIFICANT CHANGE UP
LEUKOCYTE ESTERASE UR-ACNC: ABNORMAL
MCHC RBC-ENTMCNC: 29.6 PG — SIGNIFICANT CHANGE UP (ref 27–34)
MCHC RBC-ENTMCNC: 32.1 GM/DL — SIGNIFICANT CHANGE UP (ref 32–36)
MCV RBC AUTO: 92.3 FL — SIGNIFICANT CHANGE UP (ref 80–100)
NITRITE UR-MCNC: NEGATIVE — SIGNIFICANT CHANGE UP
NRBC # BLD: 0 /100 WBCS — SIGNIFICANT CHANGE UP (ref 0–0)
PH UR: 7 — SIGNIFICANT CHANGE UP (ref 5–8)
PLATELET # BLD AUTO: 273 K/UL — SIGNIFICANT CHANGE UP (ref 150–400)
POTASSIUM SERPL-MCNC: 3.2 MMOL/L — LOW (ref 3.5–5.3)
POTASSIUM SERPL-SCNC: 3.2 MMOL/L — LOW (ref 3.5–5.3)
PROT SERPL-MCNC: 6.4 G/DL — SIGNIFICANT CHANGE UP (ref 6–8.3)
PROT UR-MCNC: 30 MG/DL
RBC # BLD: 3.51 M/UL — LOW (ref 3.8–5.2)
RBC # FLD: 14.7 % — HIGH (ref 10.3–14.5)
RBC CASTS # UR COMP ASSIST: >50 /HPF (ref 0–4)
SODIUM SERPL-SCNC: 141 MMOL/L — SIGNIFICANT CHANGE UP (ref 135–145)
SP GR SPEC: 1.01 — SIGNIFICANT CHANGE UP (ref 1.01–1.02)
UROBILINOGEN FLD QL: NEGATIVE MG/DL — SIGNIFICANT CHANGE UP
WBC # BLD: 5.58 K/UL — SIGNIFICANT CHANGE UP (ref 3.8–10.5)
WBC # FLD AUTO: 5.58 K/UL — SIGNIFICANT CHANGE UP (ref 3.8–10.5)
WBC UR QL: SIGNIFICANT CHANGE UP

## 2022-01-14 RX ORDER — LACTOBACILLUS ACIDOPHILUS 100MM CELL
1 CAPSULE ORAL THREE TIMES A DAY
Refills: 0 | Status: DISCONTINUED | OUTPATIENT
Start: 2022-01-14 | End: 2022-01-15

## 2022-01-14 RX ORDER — CIPROFLOXACIN LACTATE 400MG/40ML
500 VIAL (ML) INTRAVENOUS EVERY 12 HOURS
Refills: 0 | Status: DISCONTINUED | OUTPATIENT
Start: 2022-01-14 | End: 2022-01-15

## 2022-01-14 RX ORDER — POTASSIUM CHLORIDE 20 MEQ
40 PACKET (EA) ORAL EVERY 4 HOURS
Refills: 0 | Status: COMPLETED | OUTPATIENT
Start: 2022-01-14 | End: 2022-01-14

## 2022-01-14 RX ADMIN — FAMOTIDINE 40 MILLIGRAM(S): 10 INJECTION INTRAVENOUS at 10:00

## 2022-01-14 RX ADMIN — Medication 1 TABLET(S): at 13:10

## 2022-01-14 RX ADMIN — Medication 40 MILLIEQUIVALENT(S): at 10:00

## 2022-01-14 RX ADMIN — PRASUGREL 5 MILLIGRAM(S): 5 TABLET, FILM COATED ORAL at 13:10

## 2022-01-14 RX ADMIN — Medication 40 MILLIEQUIVALENT(S): at 14:12

## 2022-01-14 RX ADMIN — ENOXAPARIN SODIUM 40 MILLIGRAM(S): 100 INJECTION SUBCUTANEOUS at 13:10

## 2022-01-14 RX ADMIN — ATORVASTATIN CALCIUM 80 MILLIGRAM(S): 80 TABLET, FILM COATED ORAL at 21:26

## 2022-01-14 RX ADMIN — Medication 1 TABLET(S): at 21:26

## 2022-01-14 RX ADMIN — Medication 100 MILLIGRAM(S): at 23:35

## 2022-01-14 RX ADMIN — NYSTATIN CREAM 1 APPLICATION(S): 100000 CREAM TOPICAL at 21:27

## 2022-01-14 RX ADMIN — RANOLAZINE 500 MILLIGRAM(S): 500 TABLET, FILM COATED, EXTENDED RELEASE ORAL at 21:27

## 2022-01-14 RX ADMIN — Medication 81 MILLIGRAM(S): at 13:10

## 2022-01-14 RX ADMIN — NYSTATIN CREAM 1 APPLICATION(S): 100000 CREAM TOPICAL at 05:53

## 2022-01-14 RX ADMIN — NYSTATIN CREAM 1 APPLICATION(S): 100000 CREAM TOPICAL at 14:00

## 2022-01-14 RX ADMIN — RANOLAZINE 500 MILLIGRAM(S): 500 TABLET, FILM COATED, EXTENDED RELEASE ORAL at 10:07

## 2022-01-14 RX ADMIN — Medication 500 MILLIGRAM(S): at 17:55

## 2022-01-14 RX ADMIN — FAMOTIDINE 40 MILLIGRAM(S): 10 INJECTION INTRAVENOUS at 21:26

## 2022-01-14 NOTE — PROGRESS NOTE ADULT - SUBJECTIVE AND OBJECTIVE BOX
SURGERY PA NOTE    75 y/o WF with PMHx of CHF, TYLOR, CAD, COPD, nephrolithiasis admitted with abd pain diarrhea, CT findings of PSBO , enteritis    SUBJECTIVE:  Patient seen at beside seated comfortably, reporting episode of diarrhea overnight and some hematuria, which has improved, and denies abd pain.  Patient's diet advanced in am and now admits to tolerating regular diet, flatus, formed bowel movements x 2, voiding independently, ambulating.  Patient denies chest pain, shortness of breath, headache, dizziness, fever/chills, dysuria, nausea, vomiting, diarrhea.    OBJECTIVE:   T(F): 98 (01-14-22 @ 08:11), Max: 98 (01-13-22 @ 23:30)  HR: 68 (01-14-22 @ 08:11) (66 - 68)  BP: 133/79 (01-14-22 @ 08:11) (133/79 - 168/77)  RR: 16 (01-14-22 @ 08:11) (16 - 16)  SpO2: 98% (01-14-22 @ 08:11) (94% - 98%)      I&O's Detail    13 Jan 2022 07:01  -  14 Jan 2022 07:00  --------------------------------------------------------  IN:    Oral Fluid: 260 mL    sodium chloride 0.9%: 975 mL  Total IN: 1235 mL    OUT:    Voided (mL): 800 mL  Total OUT: 800 mL    Total NET: 435 mL      PHYSICAL EXAM:  General: A+O x 3, NAD, obese  Abdomen: soft, non-distended, +BS, non-tender, no guarding, no rebound, no CVA noted  Extremities: nonedematous bilaterally, warm, no calf tenderness noted     MEDICATIONS  (STANDING):  aspirin  chewable 81 milliGRAM(s) Oral daily  atorvastatin 80 milliGRAM(s) Oral at bedtime  ciprofloxacin     Tablet 500 milliGRAM(s) Oral every 12 hours  enoxaparin Injectable 40 milliGRAM(s) SubCutaneous daily  famotidine    Tablet 40 milliGRAM(s) Oral two times a day  lactobacillus acidophilus 1 Tablet(s) Oral three times a day  metoprolol succinate  milliGRAM(s) Oral daily  nystatin Powder 1 Application(s) Topical three times a day  potassium chloride    Tablet ER 40 milliEquivalent(s) Oral every 4 hours  prasugrel 5 milliGRAM(s) Oral daily  ranolazine 500 milliGRAM(s) Oral two times a day  sodium chloride 0.9%. 1000 milliLiter(s) (75 mL/Hr) IV Continuous <Continuous>    MEDICATIONS  (PRN):  ipratropium    for Nebulization 500 MICROGram(s) Nebulizer every 6 hours PRN Bronchospasm  ondansetron Injectable 4 milliGRAM(s) IV Push every 4 hours PRN Nausea and/or Vomiting      LABS:                        10.4   5.58  )-----------( 273      ( 14 Jan 2022 08:02 )             32.4     01-14    141  |  106  |  4<L>  ----------------------------<  94  3.2<L>   |  26  |  0.61    Ca    7.9<L>      14 Jan 2022 08:02    TPro  6.4  /  Alb  2.6<L>  /  TBili  0.4  /  DBili  x   /  AST  22  /  ALT  14  /  AlkPhos  86  01-14      Stool PCR - rotavirus A, enterpath EColi

## 2022-01-14 NOTE — CONSULT NOTE ADULT - SUBJECTIVE AND OBJECTIVE BOX
HPI:  76 F with PMhx of bronchitis, emphysema, TYLOR, CAD (coronary artery disease),ch diastolic, heart failure 4/27/2021 at [facility] ,prompted left cardiac cath with coronary artery stenting (LAD) spinal stenosis, Cervical Ca s/p ROBERTO, s/p Appendectomy, s/p cholecystectomy, s/p left lumpectomy,nephrolithiasis,withobstruction and left hydronephrosis, had Left percutaneous nephrolithotomy and stent in 8/21,Left ureteral stent placement in 11/21Left ureteroscopy with laser lithotripsy, retrograde pyelogram and ureteral stent exchange, presents on 1/10 to [place] ED with cc of  watery nonbloody diarrhea x 3 days. Reports that she ate sushi friday prior to  symptoms started the next day but mild, then increased. Tried immodium without relief. Denies recent abx use. Has been eating bland diet. Denies f/c, sore throat, cough, CP, SOB, vomiting, urinary complaints. No covid vaccine. CT AP showed SBO. GI PCR with EPEC     Infectious Disease consult was called to evaluate pt and for antibiotic management.    Past Medical & Surgical Hx:  PAST MEDICAL & SURGICAL HISTORY:  Malignant neoplasm of cervix, unspecified site  s/p ROBERTO 1978  Smoker  1/2 pack X 55 years  HTN (hypertension)  diagnosed 10/2017  Emphysema lung  mild, on CT scan 2017  Proteinuria  Morbid obesity with BMI of 40.0-44.9, adult  TYLOR (obstructive sleep apnea)  as per [facility] criteria; Did not complete sleep study yet  Lumbar herniated disc  Spinal stenosis of lumbar region  CAD (coronary artery disease)  1 stent (D1) - 4/21 - Dr. Metcalf  Chronic bronchitis  Cataract  History of unstable angina  last admission 4/27/2021 at [facility]   prompted left cardiac cath with coronary artery stenting (LAD)   on prasugrel   and on aspirin   last nitro since april 2021  cath done by Dr. Dwight Metcalf  Bulging lumbar disc  History of sciatica  History of spinal stenosis  dx 2008  Snores  sleep study 6/2021, no result  Calculus of kidney  H/O heart failure  last admission 2019  EF 50% (4/2021)  History of blood transfusion  History of appendectomy  1954  H/O abdominal hysterectomy  ROBERTO 1978  cervical cancer   no radiation no chemo  H/O lithotripsy  [facility] 1986 left, 2004 right, 2007 left  S/P breast lumpectomy  left, benign 1998  Prolapse, uterovaginal  s/p TVT Sling 2000  S/P cholecystectomy  2005    S/P ERCP  2006  S/P cataract surgery  bilateral, 2015  S/P CABG x 4  5/31/2019, Dr. Goncalves at [facility]  S/P colonoscopy with polypectomy  Nephrostomy status  percutaneous nephrolithotomy with nephrostomy tube for removal of right kidney stone (2017), left nephrostomy tube placement 8/5/2021      Social History--  EtOH: denies   Tobacco: denies   Drug Use: denies     FAMILY HISTORY:  Family history of kidney stone (Sibling)    Family history of cervical cancer (Mother)    FH: diverticulitis (Mother)        Allergies  adhesives (Rash)  No Known Drug Allergies    Intolerances  NONE      Home/ Out patient  Medications :    Current Inpatient Medications :    ANTIBIOTICS:   ciprofloxacin     Tablet 500 milliGRAM(s) Oral every 12 hours      OTHER RELEVANT MEDICATIONS :  aspirin  chewable 81 milliGRAM(s) Oral daily  atorvastatin 80 milliGRAM(s) Oral at bedtime  enoxaparin Injectable 40 milliGRAM(s) SubCutaneous daily  famotidine    Tablet 40 milliGRAM(s) Oral two times a day  ipratropium    for Nebulization 500 MICROGram(s) Nebulizer every 6 hours PRN  metoprolol succinate  milliGRAM(s) Oral daily  nystatin Powder 1 Application(s) Topical three times a day  ondansetron Injectable 4 milliGRAM(s) IV Push every 4 hours PRN  prasugrel 5 milliGRAM(s) Oral daily  ranolazine 500 milliGRAM(s) Oral two times a day  sodium chloride 0.9%. 1000 milliLiter(s) IV Continuous <Continuous>      ROS:  CONSTITUTIONAL:  Negative fever or chills  EYES:  Negative  blurry vision or double vision  CARDIOVASCULAR:  Negative for chest pain or palpitations  RESPIRATORY:  Negative for cough, wheezing, or SOB   GASTROINTESTINAL:  Negative for nausea, vomiting,  constipation, or abdominal pain +diarrhea,  GENITOURINARY:  Negative frequency, urgency , dysuria or hematuria   NEUROLOGIC:  No headache, confusion, dizziness, lightheadedness  All other systems were reviewed and are negative      I&O's Detail    13 Jan 2022 07:01  -  14 Jan 2022 07:00  --------------------------------------------------------  IN:    Oral Fluid: 260 mL    sodium chloride 0.9%: 975 mL  Total IN: 1235 mL    OUT:    Voided (mL): 800 mL  Total OUT: 800 mL    Total NET: 435 mL      14 Jan 2022 07:01  -  14 Jan 2022 23:32  --------------------------------------------------------  IN:    Oral Fluid: 660 mL    sodium chloride 0.9%: 375 mL  Total IN: 1035 mL    OUT:  Total OUT: 0 mL    Total NET: 1035 mL    Physical Exam:  Vital Signs Last 24 Hrs  T(C): 36.7 (14 Jan 2022 15:48), Max: 36.7 (14 Jan 2022 08:11)  T(F): 98 (14 Jan 2022 15:48), Max: 98 (14 Jan 2022 08:11)  HR: 70 (14 Jan 2022 15:48) (67 - 70)  BP: 158/75 (14 Jan 2022 15:48) (133/79 - 158/75)  RR: 16 (14 Jan 2022 15:48) (16 - 16)  SpO2: 95% (14 Jan 2022 15:48) (94% - 98%)      General: in no acute distress  Neck: supple, trachea midline  Lungs: clear, no wheeze/rhonchi  Cardiovascular: regular rate and rhythm, S1 S2  Abdomen: soft, nontender, ND, bowel sounds normal  Neurological:  alert and oriented x3  Skin: no rash  Extremities: no cyanosis/clubbing/edema    Labs:                           10.4   5.58  )-----------( 273      ( 14 Jan 2022 08:02 )             32.4   01-14    141  |  106  |  4<L>  ----------------------------<  94  3.2<L>   |  26  |  0.61    Ca    7.9<L>      14 Jan 2022 08:02    TPro  6.4  /  Alb  2.6<L>  /  TBili  0.4  /  DBili  x   /  AST  22  /  ALT  14  /  AlkPhos  86  01-14        RECENT CULTURES:    GI PCR Panel, Stool (collected 13 Jan 2022 16:49)  Source: .Stool Feces  Final Report (13 Jan 2022 21:35):    Rotavirus A    Enteropathogenic E. coli (EPEC)    DETECTED by PCR    *******Please Note:*******    GI panel PCR evaluates for:    Campylobacter, Plesiomonas shigelloides, Salmonella,    Vibrio, Yersinia enterocolitica, Enteroaggregative    Escherichia coli (EAEC), Enteropathogenic E.coli (EPEC),    Enterotoxigenic E. coli (ETEC) lt/st, Shiga-like    toxin-producing E. coli (STEC) stx1/stx2,    Shigella/ Enteroinvasive E. coli (EIEC), Cryptosporidium,    Cyclospora cayetanensis, Entamoeba histolytica,    Giardia lamblia, Adenovirus F 40/41, Astrovirus,    Norovirus GI/GII, Rotavirus A, Sapovirus      RADIOLOGY & ADDITIONAL STUDIES:    ACC: [id number] EXAM:  CT ABDOMEN AND PELVIS IC                          PROCEDURE DATE:  01/10/2022          INTERPRETATION:  CLINICAL INFORMATION: Diarrhea.    COMPARISON: None.    CONTRAST/COMPLICATIONS:  IV Contrast: Omnipaque 350  95 cc administered   5 cc discarded  Oral Contrast: NONE  Complications: None reported at time of study completion    PROCEDURE:  CT of the Abdomen and Pelvis was performed.  Sagittal and coronal reformats were performed.    FINDINGS:  LOWER CHEST: Within normal limits.    LIVER: Within normal limits.  BILE DUCTS: Normal caliber.  GALLBLADDER: Cholecystectomy.  SPLEEN: Within normal limits.  PANCREAS: Within normal limits.  ADRENALS: Within normal limits.  KIDNEYS/URETERS: Moderate LEFT hydronephrosis and dilatation of the LEFT   renal pelvis down to the level of the LEFT ureteropelvic junction. The   ureter distal to the UPJ is collapsed. No stone or calculus is seen   within the ureter.  Nonobstructing RIGHT renal calculi. No hydronephrosis or hydroureter.   Bilateral renal cysts.    BLADDER: Within normal limits.  REPRODUCTIVE ORGANS: Hysterectomy.    BOWEL: There is moderate dilatation of the proximal small bowel with a   questionable transition point in the RIGHT lower quadrant just proximal   to the terminal ileum. Findings suggest a low to moderate grade small   bowel obstruction. There is fluid and fat stranding within the mesentery.   Appendix is not visualized. No evidence of inflammation in the pericecal   region.  PERITONEUM: No ascites.  VESSELS: Atherosclerotic changes.  RETROPERITONEUM/LYMPH NODES: No lymphadenopathy.  ABDOMINAL WALL: Within normal limits.  BONES: Degenerative changes.    IMPRESSION:  1.  Moderate dilatation of the proximal small bowel with probable   transition point in the RIGHT lower quadrant. Findings suggest low to   moderate grade small bowel obstruction. Fluid and fat stranding within   the mesentery is somewhat concerning and follow-up studies are   recommended. Consider surgical consult.  2.  ModerateLEFT hydronephrosis and dilatation of the LEFT renal pelvis   with possible stricture at the LEFT UPJ.      Assessment :   76 F with gastroenteritis.  GI PCR with EPEC CT AP showed ?SBO.  Diarrhea improving      Plan :   Cipro x 3 days  Trend temps and cbc  Serial abd exams  Stable from ID standpoint    Continue with present regiment .  Approptiate use of antibiotics and adverse effects reviewed.      > 45 minutes spent in direct patient care reviewing  the notes, lab data/ imaging , discussion with multidisciplinary team. All questions were addressed and answered to the best of my capacity .    Thank you for allowing me to participate in the care of your patient .      Lauren Lal MD  Infectious Disease  [phone]
  Chief Complaint:  Patient is a 76y old  Female who presents with a chief complaint of pSBO ? enteritis  76 F with PMhx of bronchitis, emphysema, TYLOR, CAD (coronary artery disease),ch diastolic, heart failure   4/27/2021 at [facility] ,prompted left cardiac cath with coronary artery stenting (LAD) spinal stenosis, Cervical Ca s/p ROBERTO, s/p Appendectomy, s/p cholecystectomy, s/p left lumpectomy,nephrolithiasis,withobstruction and left hydronephrosis, had Left percutaneous nephrolithotomy and stent in 8/21,Left ureteral stent placement.in 11/21Left ureteroscopy with laser lithotripsy, retrograde pyelogram and ureteral stent exchange, presents on 1/10 to [place] ED,c/o watery nonbloody diarrhea x 3 days. Reports that she ate sushi friday, symptoms started the next day but mild, then increased. Tried immodium without relief. Denies recent abx use. Has been eating bland diet. Denies f/c, sore throat, cough, CP, SOB, vomiting, urinary complaints. No covid vaccine.PMD: Scarlet Kuhn  In Ed had CT abd pelvis  < from: CT Abdomen and Pelvis w/ IV Cont (01.10.22 @ 18:21) >  1.  Moderate dilatation of the proximal small bowel with probable   transition point in the RIGHT lower quadrant. Findings suggest low to   moderate grade small bowel obstruction. Fluid and fat stranding within   the mesentery is somewhat concerning and follow-up studies are   recommended. Consider surgical consult.  2.  ModerateLEFT hydronephrosis and dilatation of the LEFT renal pelvis   with possible stricture at the LEFT UPJ.    In ED afebrile, normotensive, admitted for further management for     SBO (small bowel obstruction).    Hydronephrosis, left.  nephrolithiasis  CAD s/p PCI  COPD  CH Diastolic heart failure.  h/o multiple abdominal surg    Allergies:  adhesives (Rash)  No Known Drug Allergies      Medications:  aspirin  chewable 81 milliGRAM(s) Oral daily  atorvastatin 80 milliGRAM(s) Oral at bedtime  enoxaparin Injectable 40 milliGRAM(s) SubCutaneous daily  famotidine    Tablet 40 milliGRAM(s) Oral two times a day  ipratropium    for Nebulization 500 MICROGram(s) Nebulizer every 6 hours PRN  metoprolol succinate  milliGRAM(s) Oral daily  ondansetron Injectable 4 milliGRAM(s) IV Push every 4 hours PRN  prasugrel 5 milliGRAM(s) Oral daily  ranolazine 500 milliGRAM(s) Oral two times a day  sodium chloride 0.9%. 1000 milliLiter(s) IV Continuous <Continuous>      PMHX/PSHX:  Renal colic    Malignant neoplasm of cervix, unspecified site    Smoker    HTN (hypertension)    Obesity    Emphysema lung    Proteinuria    Renal calculus, bilateral    Morbid obesity with BMI of 40.0-44.9, adult    TYLOR (obstructive sleep apnea)    Lumbar herniated disc    Spinal stenosis of lumbar region    CAD (coronary artery disease)    Chronic bronchitis    Cataract    History of unstable angina    Bulging lumbar disc    History of sciatica    History of spinal stenosis    Snores    Calculus of kidney    H/O heart failure    History of blood transfusion    History of appendectomy    H/O abdominal hysterectomy    H/O lithotripsy    S/P breast lumpectomy    Prolapse, uterovaginal    S/P cholecystectomy    S/P ERCP    S/P cataract surgery    S/P CABG x 4    S/P colonoscopy with polypectomy    Nephrostomy status        Family history:  No pertinent family history in first degree relatives    Family history of kidney stone (Sibling)    Family history of cervical cancer (Mother)    FH: diverticulitis (Mother)        Social History:   no etoh no cigs no ivda  lives at home    ROS:     General:  No wt loss, fevers, chills, night sweats, fatigue,   Eyes:  Good vision, no reported pain  ENT:  No sore throat, pain, runny nose, dysphagia  CV:  No pain, palpitations, hypo/hypertension  Resp:  No dyspnea, cough, tachypnea, wheezing  GI:  No pain, No nausea, No vomiting, No diarrhea, No constipation, No weight loss, No fever, No pruritis, No rectal bleeding, No tarry stools, No dysphagia,  :  No pain, bleeding, incontinence, nocturia  Muscle:  No pain, weakness  Neuro:  No weakness, tingling, memory problems  Psych:  No fatigue, insomnia, mood problems, depression  Endocrine:  No polyuria, polydipsia, cold/heat intolerance  Heme:  No petechiae, ecchymosis, easy bruisability  Skin:  No rash, tattoos, scars, edema      PHYSICAL EXAM:   Vital Signs:  Vital Signs Last 24 Hrs  T(C): 36.9 (11 Jan 2022 07:05), Max: 36.9 (10 Yannick 2022 22:30)  T(F): 98.4 (11 Jan 2022 07:05), Max: 98.4 (10 Yannick 2022 22:30)  HR: 72 (11 Jan 2022 07:05) (72 - 82)  BP: 123/79 (11 Jan 2022 07:05) (123/79 - 136/84)  BP(mean): --  RR: 18 (11 Jan 2022 07:05) (16 - 19)  SpO2: 94% (11 Jan 2022 07:05) (94% - 98%)  Daily Height in cm: 160.02 (10 Yannick 2022 15:38)    Daily     GENERAL:  Appears stated age, well-groomed, well-nourished, no distress  HEENT:  NC/AT,  conjunctivae clear and pink, no thyromegaly, nodules, adenopathy, no JVD, sclera -anicteric  CHEST:  Full & symmetric excursion, no increased effort, breath sounds clear  HEART:  Regular rhythm, S1, S2, no murmur/rub/S3/S4, no abdominal bruit, no edema  ABDOMEN:  Soft, non-tender, non-distended, normoactive bowel sounds,  no masses ,no hepato-splenomegaly, no signs of chronic liver disease  EXTEREMITIES:  no cyanosis,clubbing or edema  SKIN:  No rash/erythema/ecchymoses/petechiae/wounds/abscess/warm/dry  NEURO:  Alert, oriented, no asterixis, no tremor, no encephalopathy    LABS:                        11.8   5.90  )-----------( 326      ( 10 Yannick 2022 16:35 )             37.5     01-10    137  |  102  |  20  ----------------------------<  112<H>  3.8   |  28  |  1.04    Ca    8.7      10 Yannick 2022 16:35    TPro  7.5  /  Alb  3.0<L>  /  TBili  0.3  /  DBili  x   /  AST  19  /  ALT  <10<L>  /  AlkPhos  82  01-10    LIVER FUNCTIONS - ( 10 Yannick 2022 16:35 )  Alb: 3.0 g/dL / Pro: 7.5 g/dL / ALK PHOS: 82 U/L / ALT: <10 U/L DA / AST: 19 U/L / GGT: x               Amylase Serum--      Lipase serum45       Ammonia--      Imaging:          
Date/Time Patient Seen:  		  Referring MD:   Data Reviewed	       Patient is a 76y old  Female who presents with a chief complaint of pSBO (10 Yannick 2022 22:05)      Subjective/HPI  vs noted  labs reviewed  H and P reviewed   eval  CT abd noted     H&P Adult [Charted Location: [facility]] [Authored: 10-Yannick-2022 21:23]- for Visit: [id number], Incomplete, Entered, Signed in Full, General       Patient Identity:  · Birth Sex	Female     History of Present Illness:  History of Present Illness:   76 F with PMhx of bronchitis, emphysema, TYLOR, CAD (coronary artery disease),ch diastolic, heart failure   4/27/2021 at [facility] ,prompted left cardiac cath with coronary artery stenting (LAD) spinal stenosis, Cervical Ca s/p ROBERTO, s/p Appendectomy, s/p cholecystectomy, s/p left lumpectomy,nephrolithiasis,withobstruction and left hydronephrosis, had Left percutaneous nephrolithotomy and stent in 8/21,Left ureteral stent placement.in 11/21Left ureteroscopy with laser lithotripsy, retrograde pyelogram and ureteral stent exchange, presents on 1/10 to [place] ED,c/o watery nonbloody diarrhea x 3 days. Reports that she ate sushi friday, symptoms started the next day but mild, then increased. Tried immodium without relief. Denies recent abx use. Has been eating bland diet. Denies f/c, sore throat, cough, CP, SOB, vomiting, urinary complaints. No covid vaccine.PMD: Scarlet Kuhn  In Ed had CT abd pelvis  < from: CT Abdomen and Pelvis w/ IV Cont (01.10.22 @ 18:21) >  1.  Moderate dilatation of the proximal small bowel with probable   transition point in the RIGHT lower quadrant. Findings suggest low to   moderate grade small bowel obstruction. Fluid and fat stranding within   the mesentery is somewhat concerning and follow-up studies are   recommended. Consider surgical consult.  2.  ModerateLEFT hydronephrosis and dilatation of the LEFT renal pelvis   with possible stricture at the LEFT UPJ.    In ED afebrile, normotensive, admitted for further management for     SBO (small bowel obstruction).    Hydronephrosis, left.  nephrolithiasis  CAD s/p PCI  COPD  CH Diastolic heart failure.  h/o multiple abdominal surg    PAST MEDICAL & SURGICAL HISTORY:  Renal colic    Malignant neoplasm of cervix, unspecified site  s/p [facility] 1978    Smoker  1/2 pack X 55 years    HTN (hypertension)  diagnosed 10/2017    Obesity    Emphysema lung  mild, on CT scan 2017    Proteinuria    Renal calculus, bilateral    Morbid obesity with BMI of 40.0-44.9, adult    TYLOR (obstructive sleep apnea)  as per [facility] criteria; Did not complete sleep study yet    Lumbar herniated disc    Spinal stenosis of lumbar region    CAD (coronary artery disease)  1 stent (D1) - 4/21 - Dr. Metcalf    Chronic bronchitis    Cataract    History of unstable angina  last admission 4/27/2021 at [facility]   prompted left cardiac cath with coronary artery stenting (LAD)   on prasugrel   and on aspirin   last nitro since april 2021  cath done by Dr. Dwight Metcalf    Bulging lumbar disc    History of sciatica    History of spinal stenosis  dx 2008    Snores  sleep study 6/2021, no result    Calculus of kidney    H/O heart failure  last admission 2019  EF 50% (4/2021)    History of blood transfusion    History of appendectomy  1954    H/O abdominal hysterectomy  [facility] 1978  cervical cancer   no radiation no chemo    H/O lithotripsy  [facility] 1986 left, 2004 right, 2007 left    S/P breast lumpectomy  left, benign 1998    Prolapse, uterovaginal  s/p TVT Sling 2000    S/P cholecystectomy  2005    S/P ERCP  2006    S/P cataract surgery  bilateral, 2015    S/P CABG x 4  5/31/2019, Dr. Goncalves at [facility]    S/P colonoscopy with polypectomy    Nephrostomy status  percutaneous nephrolithotomy with nephrostomy tube for removal of right kidney stone (2017), left nephrostomy tube placement 8/5/2021      FAMILY HISTORY:  Mother  Still living? Unknown  Family history of cervical cancer, Age at diagnosis: Age Unknown  FH: diverticulitis, Age at diagnosis: Age Unknown    Sibling  Still living? Unknown  Family history of kidney stone, Age at diagnosis: Age Unknown.     Tobacco Screening:  · Core Measure Site	No    Risk Assessment:    Present on Admission:  Deep Venous Thrombosis	no  Pulmonary Embolus	no     Heart Failure:  Does this patient have a history of or has been diagnosed with heart failure? yes.     LV Function Assessment (LVS function was evaluated before arrival and/or during hospitalization) yes.     Is the Ejection Fraction >40% ? yes.     normal LV function.      Medication list         MEDICATIONS  (STANDING):  aspirin  chewable 81 milliGRAM(s) Oral daily  atorvastatin 80 milliGRAM(s) Oral at bedtime  enoxaparin Injectable 40 milliGRAM(s) SubCutaneous daily  famotidine    Tablet 40 milliGRAM(s) Oral two times a day  metoprolol succinate  milliGRAM(s) Oral daily  prasugrel 5 milliGRAM(s) Oral daily  ranolazine 500 milliGRAM(s) Oral two times a day  sodium chloride 0.9%. 1000 milliLiter(s) (75 mL/Hr) IV Continuous <Continuous>    MEDICATIONS  (PRN):  ipratropium    for Nebulization 500 MICROGram(s) Nebulizer every 6 hours PRN Bronchospasm  ondansetron Injectable 4 milliGRAM(s) IV Push every 4 hours PRN Nausea and/or Vomiting         Vitals log        ICU Vital Signs Last 24 Hrs  T(C): 36.7 (11 Jan 2022 01:29), Max: 36.9 (10 Yannick 2022 22:30)  T(F): 98 (11 Jan 2022 01:29), Max: 98.4 (10 Yannick 2022 22:30)  HR: 79 (11 Jan 2022 01:29) (78 - 82)  BP: 126/76 (11 Jan 2022 01:29) (125/78 - 136/84)  BP(mean): --  ABP: --  ABP(mean): --  RR: 18 (11 Jan 2022 01:29) (16 - 19)  SpO2: 96% (11 Jan 2022 01:29) (96% - 98%)           Input and Output:  I&O's Detail      Lab Data                        11.8   5.90  )-----------( 326      ( 10 Yannick 2022 16:35 )             37.5     01-10    137  |  102  |  20  ----------------------------<  112<H>  3.8   |  28  |  1.04    Ca    8.7      10 Yannick 2022 16:35    TPro  7.5  /  Alb  3.0<L>  /  TBili  0.3  /  DBili  x   /  AST  19  /  ALT  <10<L>  /  AlkPhos  82  01-10            Review of Systems	      Objective     Physical Examination        Pertinent Lab findings & Imaging      Bee:  NO   Adequate UO     I&O's Detail           Discussed with:     Cultures:	        Radiology      ACC: [id number] EXAM:  CT ABDOMEN AND PELVIS IC                          PROCEDURE DATE:  01/10/2022          INTERPRETATION:  CLINICAL INFORMATION: Diarrhea.    COMPARISON: None.    CONTRAST/COMPLICATIONS:  IV Contrast: Omnipaque 350  95 cc administered   5 cc discarded  Oral Contrast: NONE  Complications: None reported at time of study completion    PROCEDURE:  CT of the Abdomen and Pelvis was performed.  Sagittal and coronal reformats were performed.    FINDINGS:  LOWER CHEST: Within normal limits.    LIVER: Within normal limits.  BILE DUCTS: Normal caliber.  GALLBLADDER: Cholecystectomy.  SPLEEN: Within normal limits.  PANCREAS: Within normal limits.  ADRENALS: Within normal limits.  KIDNEYS/URETERS: Moderate LEFT hydronephrosis and dilatation of the LEFT   renal pelvis down to the level of the LEFT ureteropelvic junction. The   ureter distal to the UPJ is collapsed. No stone or calculus is seen   within the ureter.  Nonobstructing RIGHT renal calculi. No hydronephrosis or hydroureter.   Bilateral renal cysts.    BLADDER: Within normal limits.  REPRODUCTIVE ORGANS: Hysterectomy.    BOWEL: There is moderate dilatation of the proximal small bowel with a   questionable transition point in the RIGHT lower quadrant just proximal   to the terminal ileum. Findings suggest a low to moderate grade small   bowel obstruction. There is fluid and fat stranding within the mesentery.   Appendix is not visualized. No evidence of inflammation in the pericecal   region.  PERITONEUM: No ascites.  VESSELS: Atherosclerotic changes.  RETROPERITONEUM/LYMPH NODES: No lymphadenopathy.  ABDOMINAL WALL: Within normal limits.  BONES: Degenerative changes.    IMPRESSION:  1.  Moderate dilatation of the proximal small bowel with probable   transition point in the RIGHT lower quadrant. Findings suggest low to   moderate grade small bowel obstruction. Fluid and fat stranding within   the mesentery is somewhat concerning and follow-up studies are   recommended. Consider surgical consult.  2.  Moderate LEFT hydronephrosis and dilatation of the LEFT renal pelvis   with possible stricture at the LEFT UPJ.      --- End of Report ---            COLLEEN FOOTE MD; Attending Radiologist  This document has been electronically signed. Yannick 10 2022  7:20PM                        
GENERAL SURGERY CONSULT NOTE    Patient is a 76y old  Female who presents with a chief complaint of 3 days of watery, non bloody diarrhea      HPI:  76 F presented to [place] ER on 1/10/2022 with c/o watery nonbloody diarrhea x 3 days, occurring 10 times a day and associated with urgency, nausea, occasional vomiting, decreased appetite and an achy discomfort. Reports that she ate sushi friday, symptoms started the next day but mild, then increased. Tried immodium without relief. Denies recent abx use. Has been eating bland diet of toast, banana etc. Denies fevers, chills, sore throat, cough, CP, SOB, urinary complaints. Patient states she is feeling better since receiving IV fluids.     No covid vaccine.PMD: Scarlet Kuhn    In ED had CT abd pelvis  < from: CT Abdomen and Pelvis w/ IV Cont (01.10.22 @ 18:21) >  1.  Moderate dilatation of the proximal small bowel with probable   transition point in the RIGHT lower quadrant. Findings suggest low to   moderate grade small bowel obstruction. Fluid and fat stranding within   the mesentery is somewhat concerning and follow-up studies are   recommended. Consider surgical consult.  2.  ModerateLEFT hydronephrosis and dilatation of the LEFT renal pelvis   with possible stricture at the LEFT UPJ.      PAST MEDICAL & SURGICAL HISTORY:  Malignant neoplasm of cervix, unspecified site  s/p ROBERTOH 1978    Smoker  1/2 pack X 55 years    HTN (hypertension)  diagnosed 10/2017    Emphysema lung  mild, on CT scan 2017    Proteinuria    Morbid obesity with BMI of 40.0-44.9, adult    TYLOR (obstructive sleep apnea)  as per [facility] criteria; Did not complete sleep study yet    Lumbar herniated disc    Spinal stenosis of lumbar region    CAD (coronary artery disease)  1 stent (D1) - 4/21 - Dr. Metcalf    Chronic bronchitis    Cataract    History of unstable angina  last admission 4/27/2021 at [facility]   prompted left cardiac cath with coronary artery stenting (LAD)   on prasugrel   and on aspirin   last nitro since april 2021  cath done by Dr. Dwight Metcalf    Bulging lumbar disc    History of sciatica    History of spinal stenosis  dx 2008    Snores  sleep study 6/2021, no result    Calculus of kidney    H/O heart failure  last admission 2019  EF 50% (4/2021)    History of blood transfusion    History of appendectomy  1954    H/O abdominal hysterectomy  ROBERTO 1978  cervical cancer   no radiation no chemo    H/O lithotripsy  [facility] 1986 left, 2004 right, 2007 left    S/P breast lumpectomy  left, benign 1998    Prolapse, uterovaginal  s/p TVT Sling 2000    S/P cholecystectomy  2005    S/P ERCP  2006    S/P cataract surgery  bilateral, 2015    S/P CABG x 4  5/31/2019, Dr. Goncalves at [facility]    S/P colonoscopy with polypectomy    Nephrostomy status  percutaneous nephrolithotomy with nephrostomy tube for removal of right kidney stone (2017), left nephrostomy tube placement 8/5/2021    MEDICATIONS  (STANDING):  aspirin  chewable 81 milliGRAM(s) Oral daily  atorvastatin 80 milliGRAM(s) Oral at bedtime  enoxaparin Injectable 40 milliGRAM(s) SubCutaneous daily  famotidine    Tablet 40 milliGRAM(s) Oral two times a day  metoprolol succinate  milliGRAM(s) Oral daily  prasugrel 5 milliGRAM(s) Oral daily  ranolazine 500 milliGRAM(s) Oral two times a day  sodium chloride 0.9%. 1000 milliLiter(s) (75 mL/Hr) IV Continuous <Continuous>    MEDICATIONS  (PRN):  ipratropium    for Nebulization 500 MICROGram(s) Nebulizer every 6 hours PRN Bronchospasm  ondansetron Injectable 4 milliGRAM(s) IV Push every 4 hours PRN Nausea and/or Vomiting      Allergies    adhesives (Rash)  No Known Drug Allergies    SOCIAL HISTORY          Smoking: Yes [ ]  No [X ]   Quit  4 years ago, smoked 1/2 ppd x50 years          ETOH  Yes [ ]  No [ ]  Social [X ]          DRUGS:  Yes [ ]  No [X ]  if so what______________    FAMILY HISTORY:  Family history of kidney stone (Sibling)    Family history of cervical cancer (Mother)    FH: diverticulitis (Mother)        Vital Signs Last 24 Hrs  T(C): 36.9 (11 Jan 2022 07:05), Max: 36.9 (10 Yannick 2022 22:30)  T(F): 98.4 (11 Jan 2022 07:05), Max: 98.4 (10 Yannick 2022 22:30)  HR: 72 (11 Jan 2022 07:05) (72 - 82)  BP: 123/79 (11 Jan 2022 07:05) (123/79 - 136/84)  BP(mean): --  RR: 18 (11 Jan 2022 07:05) (16 - 19)  SpO2: 94% (11 Jan 2022 07:05) (94% - 98%)    Physical Exam:    General:  Appears stated age, well-groomed, well-nourished, no distress  Eyes : ADAN  Chest:  Equal chest expansion  Cardiovascular:  pulse regular  Abdomen: soft, obese, non tender, no guarding, no rebound   Extremities:  No C/C/E  Skin:  + rash under pannus  Neuro/Psych:  A&O x3      LABS:                        11.8   5.90  )-----------( 326      ( 10 Yannick 2022 16:35 )             37.5     01-10    137  |  102  |  20  ----------------------------<  112<H>  3.8   |  28  |  1.04    Ca    8.7      10 Yannick 2022 16:35    TPro  7.5  /  Alb  3.0<L>  /  TBili  0.3  /  DBili  x   /  AST  19  /  ALT  <10<L>  /  AlkPhos  82  01-10          RADIOLOGY & ADDITIONAL STUDIES:  ACC: [id number] EXAM:  CT ABDOMEN AND PELVIS IC                          PROCEDURE DATE:  01/10/2022          INTERPRETATION:  CLINICAL INFORMATION: Diarrhea.    COMPARISON: None.    CONTRAST/COMPLICATIONS:  IV Contrast: Omnipaque 350  95 cc administered   5 cc discarded  Oral Contrast: NONE  Complications: None reported at time of study completion    PROCEDURE:  CT of the Abdomen and Pelvis was performed.  Sagittal and coronal reformats were performed.    FINDINGS:  LOWER CHEST: Within normal limits.    LIVER: Within normal limits.  BILE DUCTS: Normal caliber.  GALLBLADDER: Cholecystectomy.  SPLEEN: Within normal limits.  PANCREAS: Within normal limits.  ADRENALS: Within normal limits.  KIDNEYS/URETERS: Moderate LEFT hydronephrosis and dilatation of the LEFT   renal pelvis down to the level of the LEFT ureteropelvic junction. The   ureter distal to the UPJ is collapsed. No stone or calculus is seen   within the ureter.  Nonobstructing RIGHT renal calculi. No hydronephrosis or hydroureter.   Bilateral renal cysts.    BLADDER: Within normal limits.  REPRODUCTIVE ORGANS: Hysterectomy.    BOWEL: There is moderate dilatation of the proximal small bowel with a   questionable transition point in the RIGHT lower quadrant just proximal   to the terminal ileum. Findings suggest a low to moderate grade small   bowel obstruction. There is fluid and fat stranding within the mesentery.   Appendix is not visualized. No evidence of inflammation in the pericecal   region.  PERITONEUM: No ascites.  VESSELS: Atherosclerotic changes.  RETROPERITONEUM/LYMPH NODES: No lymphadenopathy.  ABDOMINAL WALL: Within normal limits.  BONES: Degenerative changes.    IMPRESSION:  1.  Moderate dilatation of the proximal small bowel with probable   transition point in the RIGHT lower quadrant. Findings suggest low to   moderate grade small bowel obstruction. Fluid and fat stranding within   the mesentery is somewhat concerning and follow-up studies are   recommended. Consider surgical consult.  2.  ModerateLEFT hydronephrosis and dilatation of the LEFT renal pelvis   with possible stricture at the LEFT UPJ.      --- End of Report ---            COLLEEN FOOTE MD; Attending Radiologist  This document has been electronically signed. Yannick 10 2022  7:20PM    A/P:  76 year old female with possible SBO vs. enteritis.  Dr. Arteaga reviewed CT scan and feels enteritis seems more likely.  Will obtain small bowel series for further evaluation.  Further recommendations pending results.  Case and plan discussed Dr. Arteaga
History of Present Illness: The patient is a 76 year old female with a history of HTN, HL, TYLOR, COPD, CAD s/p PCI, cervical cancer s/p ROBERTO, nephrolithiasis s/p stent and nephrostomy tube who presents with diarrhea for the past 3 days. There was associated nausea and decreased PO intake. No chest pain or shortness of breath. She had PCI done to a diagonal artery in 4/21.    Past Medical/Surgical History:  HTN, HL, TYLOR, COPD, CAD s/p PCI, cervical cancer s/p ROBERTO, nephrolithiasis s/p stent and nephrostomy tube    Medications:  Home Medications:  aspirin 81 mg oral tablet: orally once a day (10 Yannick 2022 20:57)  famotidine 40 mg oral tablet: 1 tab(s) orally 2 times a day (10 Yannick 2022 20:57)  furosemide 20 mg oral tablet: 1 tab(s) orally once a day (10 Yannick 2022 20:57)  Metoprolol Succinate  mg oral tablet, extended release: 1 tab(s) orally once a day (at bedtime) (10 Yannick 2022 20:57)  prasugrel 5 mg oral tablet: 1 tab(s) orally once a day (10 Yannick 2022 20:57)  ranolazine 500 mg oral tablet, extended release: 1 tab(s) orally 2 times a day (10 Yannick 2022 20:57)  rosuvastatin 20 mg oral tablet: 1 tab(s) orally once a day (at bedtime) (10 Yannick 2022 20:57)      Family History: Non-contributory family history of premature cardiovascular atherosclerotic disease    Social History: No tobacco, alcohol or drug use    Review of Systems:  General: No fevers, chills, weight gain  Skin: No rashes, color changes  Cardiovascular: No chest pain, orthopnea  Respiratory: No shortness of breath, cough  Gastrointestinal: No nausea, abdominal pain  Genitourinary: No incontinence, pain with urination  Musculoskeletal: No pain, swelling, decreased range of motion  Neurological: No headache, weakness  Psychiatric: No depression, anxiety  Endocrine: No weight gain, increased thirst  All other systems are comprehensively negative.    Physical Exam:  Vitals:        Vital Signs Last 24 Hrs  T(C): 36.7 (11 Jan 2022 01:29), Max: 36.9 (10 Yannick 2022 22:30)  T(F): 98 (11 Jan 2022 01:29), Max: 98.4 (10 Yannick 2022 22:30)  HR: 79 (11 Jan 2022 01:29) (78 - 82)  BP: 126/76 (11 Jan 2022 01:29) (125/78 - 136/84)  BP(mean): --  RR: 18 (11 Jan 2022 01:29) (16 - 19)  SpO2: 96% (11 Jan 2022 01:29) (96% - 98%)  General: NAD  HEENT: MMM  Neck: No JVD, no carotid bruit  Lungs: CTAB  CV: RRR, nl S1/S2, no M/R/G  Abdomen: S/NT/ND, +BS  Extremities: No LE edema, no cyanosis  Neuro: AAOx3, non-focal  Skin: No rash    Labs:                        11.8   5.90  )-----------( 326      ( 10 Yannick 2022 16:35 )             37.5     01-10    137  |  102  |  20  ----------------------------<  112<H>  3.8   |  28  |  1.04    Ca    8.7      10 Yannick 2022 16:35    TPro  7.5  /  Alb  3.0<L>  /  TBili  0.3  /  DBili  x   /  AST  19  /  ALT  <10<L>  /  AlkPhos  82  01-10            ECG: Pending    
Patient is a 76y old  Female who presents with a chief complaint of vomiting    HISTORY OF PRESENT ILLNESS:  77 y/o female presented with vomiting, CT (see result below) shows pSBO.   consulted for incidental finding of moderate left hydro with possible left UPJ stricture.  Pt with long h/o nephrolithiasis, originally treated by Dr. Mendez, then with Dr. Goldberg, and now with their associate Dr. Dwight Broderick.  Pt is s/p left PCNL and ureteroscopy 9/17/21 for obstructing stones.  Left PCN was removed 9/27/21.  Left ureteral stent change and laser lithotripsy was performed 11/17/21.  Left stent was finally removed 11/29/21.  Pt has a f/u appointment with Dr. Broderick to re-evaluate the left renal unit in the near future.  BUN/Cr WNL.  Pt denies flank pain.    PAST MEDICAL & SURGICAL HISTORY:  Malignant neoplasm of cervix, unspecified site  s/p ROBERTO 1978    Smoker  1/2 pack X 55 years    HTN (hypertension)  diagnosed 10/2017    Emphysema lung  mild, on CT scan 2017    Proteinuria    Morbid obesity with BMI of 40.0-44.9, adult    TYLOR (obstructive sleep apnea)  as per [facility] criteria; Did not complete sleep study yet    Lumbar herniated disc    Spinal stenosis of lumbar region    CAD (coronary artery disease)  1 stent (D1) - 4/21 - Dr. Metcalf    Chronic bronchitis    Cataract    History of unstable angina  last admission 4/27/2021 at [facility]   prompted left cardiac cath with coronary artery stenting (LAD)   on prasugrel   and on aspirin   last nitro since april 2021  cath done by Dr. Dwight Metcalf    Bulging lumbar disc    History of sciatica    History of spinal stenosis  dx 2008    Snores  sleep study 6/2021, no result    Calculus of kidney    H/O heart failure  last admission 2019  EF 50% (4/2021)    History of blood transfusion    History of appendectomy  1954    H/O abdominal hysterectomy  [facility] 1978  cervical cancer   no radiation no chemo    H/O lithotripsy  [facility] 1986 left, 2004 right, 2007 left    S/P breast lumpectomy  left, benign 1998    Prolapse, uterovaginal  s/p TVT Sling 2000    S/P cholecystectomy  2005    S/P ERCP  2006    S/P cataract surgery  bilateral, 2015    S/P CABG x 4  5/31/2019, Dr. Goncalves at [facility]    S/P colonoscopy with polypectomy    Nephrostomy status  percutaneous nephrolithotomy with nephrostomy tube for removal of right kidney stone (2017), left nephrostomy tube placement 8/5/2021        REVIEW OF SYSTEMS:    CONSTITUTIONAL: No weakness, fevers or chills  SKIN: No itching, burning, rashes, or lesions   EYES/ENT: No visual changes;  No vertigo or throat pain   NECK: No pain or stiffness  RESPIRATORY: No cough, wheezing, hemoptysis; No shortness of breath  CARDIOVASCULAR: No chest pain or palpitations  GASTROINTESTINAL: No abdominal or epigastric pain. No nausea, vomiting, diarrhea  NEUROLOGICAL: No numbness or weakness  GENITOURINARY:     No hematuria, dysuria, incontinence    All other review of systems is negative unless indicated above.    MEDICATIONS  (STANDING):  aspirin  chewable 81 milliGRAM(s) Oral daily  atorvastatin 80 milliGRAM(s) Oral at bedtime  enoxaparin Injectable 40 milliGRAM(s) SubCutaneous daily  famotidine    Tablet 40 milliGRAM(s) Oral two times a day  metoprolol succinate  milliGRAM(s) Oral daily  ranolazine 500 milliGRAM(s) Oral two times a day  sodium chloride 0.9%. 1000 milliLiter(s) (75 mL/Hr) IV Continuous <Continuous>    MEDICATIONS  (PRN):  ipratropium    for Nebulization 500 MICROGram(s) Nebulizer every 6 hours PRN Bronchospasm  ondansetron Injectable 4 milliGRAM(s) IV Push every 4 hours PRN Nausea and/or Vomiting      Allergies    adhesives (Rash)  No Known Drug Allergies      SOCIAL HISTORY:   Smoking: quit 4 yrs ago.  smoked 1/2 ppd x >40 yrs   EtOH: no   Work: former  at [employer]      FAMILY HISTORY:  Family history of kidney stone (Sibling)    Family history of cervical cancer (Mother)    FH: diverticulitis (Mother)        Vital Signs Last 24 Hrs  T(C): 36.8 (10 Yannick 2022 18:56), Max: 36.8 (10 Yannick 2022 18:56)  T(F): 98.2 (10 Yannick 2022 18:56), Max: 98.2 (10 Yannick 2022 18:56)  HR: 81 (10 Yannick 2022 18:56) (81 - 82)  BP: 129/82 (10 Yannick 2022 18:56) (129/82 - 136/84)  BP(mean): --  RR: 19 (10 Yannick 2022 18:56) (16 - 19)  SpO2: 97% (10 Yannick 2022 18:56) (97% - 97%)    PHYSICAL EXAM:    Constitutional: NAD, obese  HEENT: PERRLA, EOMI  Neck: Supple, full ROM  Back: No CVA tenderness  Respiratory: Normal repiratory effort  Cardiovascular: RRR  Abd: obese  Neurological: A&O x 3, no focal deficits  Psychiatric: Normal mood, normal affect  Musculoskeletal: no clubbing/cyanosis/edema  Skin: No rashes    LABS:                        11.8   5.90  )-----------( 326      ( 10 Yannick 2022 16:35 )             37.5     01-10    137  |  102  |  20  ----------------------------<  112<H>  3.8   |  28  |  1.04    Ca    8.7      10 Yannick 2022 16:35    TPro  7.5  /  Alb  3.0<L>  /  TBili  0.3  /  DBili  x   /  AST  19  /  ALT  <10<L>  /  AlkPhos  82  01-10        Urine Culture:       RADIOLOGY & ADDITIONAL STUDIES:    PROCEDURE DATE:  01/10/2022          INTERPRETATION:  CLINICAL INFORMATION: Diarrhea.    COMPARISON: None.    CONTRAST/COMPLICATIONS:  IV Contrast: Omnipaque 350  95 cc administered   5 cc discarded  Oral Contrast: NONE  Complications: None reported at time of study completion    PROCEDURE:  CT of the Abdomen and Pelvis was performed.  Sagittal and coronal reformats were performed.    FINDINGS:  LOWER CHEST: Within normal limits.    LIVER: Within normal limits.  BILE DUCTS: Normal caliber.  GALLBLADDER: Cholecystectomy.  SPLEEN: Within normal limits.  PANCREAS: Within normal limits.  ADRENALS: Within normal limits.  KIDNEYS/URETERS: Moderate LEFT hydronephrosis and dilatation of the LEFT   renal pelvis down to the level of the LEFT ureteropelvic junction. The   ureter distal to the UPJ is collapsed. No stone or calculus is seen   within the ureter.  Nonobstructing RIGHT renal calculi. No hydronephrosis or hydroureter.   Bilateral renal cysts.    BLADDER: Within normal limits.  REPRODUCTIVE ORGANS: Hysterectomy.    BOWEL: There is moderate dilatation of the proximal small bowel with a   questionable transition point in the RIGHT lower quadrant just proximal   to the terminal ileum. Findings suggest a low to moderate grade small   bowel obstruction. There is fluid and fat stranding within the mesentery.   Appendix is not visualized. No evidence of inflammation in the pericecal   region.  PERITONEUM: No ascites.  VESSELS: Atherosclerotic changes.  RETROPERITONEUM/LYMPH NODES: No lymphadenopathy.  ABDOMINAL WALL: Within normal limits.  BONES: Degenerative changes.    IMPRESSION:  1.  Moderate dilatation of the proximal small bowel with probable   transition point in the RIGHT lower quadrant. Findings suggest low to   moderate grade small bowel obstruction. Fluid and fat stranding within   the mesentery is somewhat concerning and follow-up studies are   recommended. Consider surgical consult.  2.  ModerateLEFT hydronephrosis and dilatation of the LEFT renal pelvis   with possible stricture at the LEFT UPJ

## 2022-01-14 NOTE — CONSULT NOTE ADULT - CONSULT REASON
CAD
SBO vs. enteritis
Gastroenteritis
copd  emphysema
Incidental CT finding of moderate left hydro with no ureteral stones
sbo

## 2022-01-14 NOTE — PROGRESS NOTE ADULT - SUBJECTIVE AND OBJECTIVE BOX
Date/Time Patient Seen:  		  Referring MD:   Data Reviewed	       Patient is a 76y old  Female who presents with a chief complaint of diarrhea (13 Jan 2022 12:27)      Subjective/HPI     PAST MEDICAL & SURGICAL HISTORY:  Renal colic    Malignant neoplasm of cervix, unspecified site  s/p ROBERTO 1978    Smoker  1/2 pack X 55 years    HTN (hypertension)  diagnosed 10/2017    Obesity    Emphysema lung  mild, on CT scan 2017    Proteinuria    Renal calculus, bilateral    Morbid obesity with BMI of 40.0-44.9, adult    TYLOR (obstructive sleep apnea)  as per [facility] criteria; Did not complete sleep study yet    Lumbar herniated disc    Spinal stenosis of lumbar region    CAD (coronary artery disease)  1 stent (D1) - 4/21 - Dr. Metcalf    Chronic bronchitis    Cataract    History of unstable angina  last admission 4/27/2021 at [facility]   prompted left cardiac cath with coronary artery stenting (LAD)   on prasugrel   and on aspirin   last nitro since april 2021  cath done by Dr. Dwight Metcalf    Bulging lumbar disc    History of sciatica    History of spinal stenosis  dx 2008    Snores  sleep study 6/2021, no result    Calculus of kidney    H/O heart failure  last admission 2019  EF 50% (4/2021)    History of blood transfusion    History of appendectomy  1954    H/O abdominal hysterectomy  [facility] 1978  cervical cancer   no radiation no chemo    H/O lithotripsy  [facility] 1986 left, 2004 right, 2007 left    S/P breast lumpectomy  left, benign 1998    Prolapse, uterovaginal  s/p TVT Sling 2000    S/P cholecystectomy  2005    S/P ERCP  2006    S/P cataract surgery  bilateral, 2015    S/P CABG x 4  5/31/2019, Dr. Goncalves at [facility]    S/P colonoscopy with polypectomy    Nephrostomy status  percutaneous nephrolithotomy with nephrostomy tube for removal of right kidney stone (2017), left nephrostomy tube placement 8/5/2021          Medication list         MEDICATIONS  (STANDING):  aspirin  chewable 81 milliGRAM(s) Oral daily  atorvastatin 80 milliGRAM(s) Oral at bedtime  enoxaparin Injectable 40 milliGRAM(s) SubCutaneous daily  famotidine    Tablet 40 milliGRAM(s) Oral two times a day  metoprolol succinate  milliGRAM(s) Oral daily  nystatin Powder 1 Application(s) Topical three times a day  prasugrel 5 milliGRAM(s) Oral daily  ranolazine 500 milliGRAM(s) Oral two times a day  sodium chloride 0.9%. 1000 milliLiter(s) (75 mL/Hr) IV Continuous <Continuous>    MEDICATIONS  (PRN):  ipratropium    for Nebulization 500 MICROGram(s) Nebulizer every 6 hours PRN Bronchospasm  ondansetron Injectable 4 milliGRAM(s) IV Push every 4 hours PRN Nausea and/or Vomiting         Vitals log        ICU Vital Signs Last 24 Hrs  T(C): 36.4 (14 Jan 2022 03:30), Max: 36.7 (13 Jan 2022 07:55)  T(F): 97.5 (14 Jan 2022 03:30), Max: 98 (13 Jan 2022 07:55)  HR: 67 (14 Jan 2022 03:30) (66 - 70)  BP: 155/80 (14 Jan 2022 03:30) (146/65 - 168/77)  BP(mean): --  ABP: --  ABP(mean): --  RR: 16 (14 Jan 2022 03:30) (16 - 16)  SpO2: 94% (14 Jan 2022 03:30) (94% - 96%)           Input and Output:  I&O's Detail    12 Jan 2022 07:01  -  13 Jan 2022 07:00  --------------------------------------------------------  IN:    sodium chloride 0.9%: 75 mL  Total IN: 75 mL    OUT:  Total OUT: 0 mL    Total NET: 75 mL      13 Jan 2022 07:01  -  14 Jan 2022 06:21  --------------------------------------------------------  IN:    Oral Fluid: 260 mL    sodium chloride 0.9%: 975 mL  Total IN: 1235 mL    OUT:    Voided (mL): 800 mL  Total OUT: 800 mL    Total NET: 435 mL          Lab Data                        11.4   5.85  )-----------( 180      ( 13 Jan 2022 06:33 )             34.9     01-13    140  |  106  |  10  ----------------------------<  98  4.1   |  27  |  0.75    Ca    8.1<L>      13 Jan 2022 06:33    TPro  6.9  /  Alb  2.7<L>  /  TBili  0.5  /  DBili  x   /  AST  25  /  ALT  17  /  AlkPhos  79  01-13            Review of Systems	      Objective     Physical Examination    heart s1s2  lung dec BS  abd soft      Pertinent Lab findings & Imaging      [place]:  NO   Adequate UO     I&O's Detail    12 Jan 2022 07:01  -  13 Jan 2022 07:00  --------------------------------------------------------  IN:    sodium chloride 0.9%: 75 mL  Total IN: 75 mL    OUT:  Total OUT: 0 mL    Total NET: 75 mL      13 Jan 2022 07:01  -  14 Jan 2022 06:21  --------------------------------------------------------  IN:    Oral Fluid: 260 mL    sodium chloride 0.9%: 975 mL  Total IN: 1235 mL    OUT:    Voided (mL): 800 mL  Total OUT: 800 mL    Total NET: 435 mL               Discussed with:     Cultures:	  Culture Results:   Rotavirus A  Enteropathogenic E. coli (EPEC)  DETECTED by PCR  *******Please Note:*******  GI panel PCR evaluates for:  Campylobacter, Plesiomonas shigelloides, Salmonella,  Vibrio, Yersinia enterocolitica, Enteroaggregative  Escherichia coli (EAEC), Enteropathogenic E.coli (EPEC),  Enterotoxigenic E. coli (ETEC) lt/st, Shiga-like  toxin-producing E. coli (STEC) stx1/stx2,  Shigella/ Enteroinvasive E. coli (EIEC), Cryptosporidium,  Cyclospora cayetanensis, Entamoeba histolytica,  Giardia lamblia, Adenovirus F 40/41, Astrovirus,  Norovirus GI/GII, Rotavirus A, Sapovirus (01-13 @ 16:49)        Radiology

## 2022-01-14 NOTE — PROGRESS NOTE ADULT - SUBJECTIVE AND OBJECTIVE BOX
Patient is a 76y old  Female who presents with a chief complaint of diarrhea (14 Jan 2022 06:21)      INTERVAL HPI/OVERNIGHT EVENTS:overnight events noted    Home Medications:  aspirin 81 mg oral tablet: orally once a day (10 Yannick 2022 20:57)  famotidine 40 mg oral tablet: 1 tab(s) orally 2 times a day (10 Yannick 2022 20:57)  furosemide 20 mg oral tablet: 1 tab(s) orally once a day (10 Yannick 2022 20:57)  Metoprolol Succinate  mg oral tablet, extended release: 1 tab(s) orally once a day (at bedtime) (10 Yannick 2022 20:57)  prasugrel 5 mg oral tablet: 1 tab(s) orally once a day (10 Yannick 2022 20:57)  ranolazine 500 mg oral tablet, extended release: 1 tab(s) orally 2 times a day (10 Yannick 2022 20:57)  rosuvastatin 20 mg oral tablet: 1 tab(s) orally once a day (at bedtime) (10 Yannick 2022 20:57)      MEDICATIONS  (STANDING):  aspirin  chewable 81 milliGRAM(s) Oral daily  atorvastatin 80 milliGRAM(s) Oral at bedtime  ciprofloxacin     Tablet 500 milliGRAM(s) Oral every 12 hours  enoxaparin Injectable 40 milliGRAM(s) SubCutaneous daily  famotidine    Tablet 40 milliGRAM(s) Oral two times a day  lactobacillus acidophilus 1 Tablet(s) Oral three times a day  metoprolol succinate  milliGRAM(s) Oral daily  nystatin Powder 1 Application(s) Topical three times a day  potassium chloride    Tablet ER 40 milliEquivalent(s) Oral every 4 hours  prasugrel 5 milliGRAM(s) Oral daily  ranolazine 500 milliGRAM(s) Oral two times a day  sodium chloride 0.9%. 1000 milliLiter(s) (75 mL/Hr) IV Continuous <Continuous>    MEDICATIONS  (PRN):  ipratropium    for Nebulization 500 MICROGram(s) Nebulizer every 6 hours PRN Bronchospasm  ondansetron Injectable 4 milliGRAM(s) IV Push every 4 hours PRN Nausea and/or Vomiting      Allergies    adhesives (Rash)  No Known Drug Allergies    Intolerances        REVIEW OF SYSTEMS:  CONSTITUTIONAL: No fever, weight loss, or fatigue  EYES: No eye pain, visual disturbances, or discharge  ENMT:  No difficulty hearing, tinnitus, vertigo; No sinus or throat pain  NECK: No pain or stiffness  BREASTS: No pain, masses, or nipple discharge  RESPIRATORY: No cough, wheezing, chills or hemoptysis; No shortness of breath  CARDIOVASCULAR: No chest pain, palpitations, dizziness, or leg swelling  GASTROINTESTINAL: No abdominal or epigastric pain. No nausea, vomiting, or hematemesis; had diarrhea .  GENITOURINARY: saw blood in urine, no dysuria  NEUROLOGICAL: No headaches, memory loss, loss of strength, numbness, or tremors  SKIN: No itching, burning, rashes, or lesions   LYMPH NODES: No enlarged glands  ENDOCRINE: No heat or cold intolerance; No hair loss  MUSCULOSKELETAL: No joint pain or swelling; No muscle, back, or extremity pain  PSYCHIATRIC: No depression, anxiety, mood swings, or difficulty sleeping  HEME/LYMPH: No easy bruising, or bleeding gums  ALLERGY AND IMMUNOLOGIC: No hives or eczema    Vital Signs Last 24 Hrs  T(C): 36.7 (14 Jan 2022 08:11), Max: 36.7 (13 Jan 2022 23:30)  T(F): 98 (14 Jan 2022 08:11), Max: 98 (13 Jan 2022 23:30)  HR: 68 (14 Jan 2022 08:11) (66 - 70)  BP: 133/79 (14 Jan 2022 08:11) (133/79 - 168/77)  BP(mean): --  RR: 16 (14 Jan 2022 08:11) (16 - 16)  SpO2: 98% (14 Jan 2022 08:11) (94% - 98%)    PHYSICAL EXAM:  GENERAL:  well-groomed, well-developed  HEAD:  Atraumatic, Normocephalic  EYES: EOMI, PERRLA, conjunctiva and sclera clear  ENMT: Moist mucous membranes,   NECK: Supple, No JVD, Normal thyroid  NERVOUS SYSTEM:  Alert & Oriented X3, Good concentration; Motor Strength 5/5 B/L upper and lower extremities; DTRs 2+ intact and symmetric  CHEST/LUNG: Clear to percussion bilaterally; No rales, rhonchi, wheezing, or rubs  HEART: Regular rate and rhythm; No murmurs, rubs, or gallops  ABDOMEN: Soft, Nontender, Nondistended; Bowel sounds present  EXTREMITIES:  2+ Peripheral Pulses, No clubbing, cyanosis, or edema  LYMPH: No lymphadenopathy noted  SKIN: No rashes or lesions    LABS:                        10.4   5.58  )-----------( 273      ( 14 Jan 2022 08:02 )             32.4     01-14    141  |  106  |  4<L>  ----------------------------<  94  3.2<L>   |  26  |  0.61    Ca    7.9<L>      14 Jan 2022 08:02    TPro  6.4  /  Alb  2.6<L>  /  TBili  0.4  /  DBili  x   /  AST  22  /  ALT  14  /  AlkPhos  86  01-14        CAPILLARY BLOOD GLUCOSE            GI PCR Panel, Stool (collected 01-13-22 @ 16:49)  Source: .Stool Feces  Final Report (01-13-22 @ 21:35):    Rotavirus A    Enteropathogenic E. coli (EPEC)    DETECTED by PCR    *******Please Note:*******    GI panel PCR evaluates for:    Campylobacter, Plesiomonas shigelloides, Salmonella,    Vibrio, Yersinia enterocolitica, Enteroaggregative    Escherichia coli (EAEC), Enteropathogenic E.coli (EPEC),    Enterotoxigenic E. coli (ETEC) lt/st, Shiga-like    toxin-producing E. coli (STEC) stx1/stx2,    Shigella/ Enteroinvasive E. coli (EIEC), Cryptosporidium,    Cyclospora cayetanensis, Entamoeba histolytica,    Giardia lamblia, Adenovirus F 40/41, Astrovirus,    Norovirus GI/GII, Rotavirus A, Sapovirus        I&O's Summary    13 Jan 2022 07:01  -  14 Jan 2022 07:00  --------------------------------------------------------  IN: 1235 mL / OUT: 800 mL / NET: 435 mL        RADIOLOGY & ADDITIONAL TESTS:    Imaging Personally Reviewed:  [x ] YES  [ ] NO    Consultant(s) Notes Reviewed:  [x ] YES  [ ] NO    Care Discussed with Consultants/Other Providers [ ]x YES  [ ] NO

## 2022-01-14 NOTE — PROGRESS NOTE ADULT - SUBJECTIVE AND OBJECTIVE BOX
Chief Complaint: Abdominal pain    Interval Events: No events overnight.    Review of Systems:  General: No fevers, chills, weight gain  Skin: No rashes, color changes  Cardiovascular: No chest pain, orthopnea  Respiratory: No shortness of breath, cough  Gastrointestinal: No nausea, abdominal pain  Genitourinary: No incontinence, pain with urination  Musculoskeletal: No pain, swelling, decreased range of motion  Neurological: No headache, weakness  Psychiatric: No depression, anxiety  Endocrine: No weight gain, increased thirst  All other systems are comprehensively negative.    Physical Exam:  Vital Signs Last 24 Hrs  T(C): 36.4 (14 Jan 2022 03:30), Max: 36.7 (13 Jan 2022 23:30)  T(F): 97.5 (14 Jan 2022 03:30), Max: 98 (13 Jan 2022 23:30)  HR: 67 (14 Jan 2022 03:30) (66 - 70)  BP: 155/80 (14 Jan 2022 03:30) (155/80 - 168/77)  BP(mean): --  RR: 16 (14 Jan 2022 03:30) (16 - 16)  SpO2: 94% (14 Jan 2022 03:30) (94% - 96%)  General: NAD  HEENT: MMM  Neck: No JVD, no carotid bruit  Lungs: CTAB  CV: RRR, nl S1/S2, no M/R/G  Abdomen: S/NT/ND, +BS  Extremities: No LE edema, no cyanosis  Neuro: AAOx3, non-focal  Skin: No rash    Labs:             01-13    140  |  106  |  10  ----------------------------<  98  4.1   |  27  |  0.75    Ca    8.1<L>      13 Jan 2022 06:33    TPro  6.9  /  Alb  2.7<L>  /  TBili  0.5  /  DBili  x   /  AST  25  /  ALT  17  /  AlkPhos  79  01-13                        10.4   5.58  )-----------( x        ( 14 Jan 2022 08:02 )             32.4

## 2022-01-14 NOTE — PROGRESS NOTE ADULT - SUBJECTIVE AND OBJECTIVE BOX
No new overnight event.  No N/V/D.  Tolerating diet.      Allergies    adhesives (Rash)  No Known Drug Allergies    Intolerances          General:  No wt loss, fevers, chills, night sweats, fatigue,   Eyes:  Good vision, no reported pain  ENT:  No sore throat, pain, runny nose, dysphagia  CV:  No pain, palpitations, hypo/hypertension  Resp:  No dyspnea, cough, tachypnea, wheezing  GI:  No pain, No nausea, No vomiting, No diarrhea, No constipation, No weight loss, No fever, No pruritis, No rectal bleeding, No tarry stools, No dysphagia,  :  No pain, bleeding, incontinence, nocturia  Muscle:  No pain, weakness  Neuro:  No weakness, tingling, memory problems  Psych:  No fatigue, insomnia, mood problems, depression  Endocrine:  No polyuria, polydipsia, cold/heat intolerance  Heme:  No petechiae, ecchymosis, easy bruisability  Skin:  No rash, tattoos, scars, edema      PHYSICAL EXAM:   Vital Signs:  Vital Signs Last 24 Hrs  T(C): 36.7 (14 Jan 2022 08:11), Max: 36.7 (13 Jan 2022 23:30)  T(F): 98 (14 Jan 2022 08:11), Max: 98 (13 Jan 2022 23:30)  HR: 68 (14 Jan 2022 08:11) (66 - 70)  BP: 133/79 (14 Jan 2022 08:11) (133/79 - 168/77)  BP(mean): --  RR: 16 (14 Jan 2022 08:11) (16 - 16)  SpO2: 98% (14 Jan 2022 08:11) (94% - 98%)  Daily     Daily I&O's Summary    13 Jan 2022 07:01  -  14 Jan 2022 07:00  --------------------------------------------------------  IN: 1235 mL / OUT: 800 mL / NET: 435 mL        GENERAL:  Appears stated age, well-groomed, well-nourished, no distress  HEENT:  NC/AT,  conjunctivae clear and pink, no thyromegaly, nodules, adenopathy, no JVD, sclera -anicteric  CHEST:  Full & symmetric excursion, no increased effort, breath sounds clear  HEART:  Regular rhythm, S1, S2, no murmur/rub/S3/S4, no abdominal bruit, no edema  ABDOMEN:  Soft, non-tender, non-distended, normoactive bowel sounds,  no masses ,no hepato-splenomegaly, no signs of chronic liver disease  EXTEREMITIES:  no cyanosis,clubbing or edema  SKIN:  No rash/erythema/ecchymoses/petechiae/wounds/abscess/warm/dry  NEURO:  Alert, oriented, no asterixis, no tremor, no encephalopathy      LABS:                        10.4   5.58  )-----------( 273      ( 14 Jan 2022 08:02 )             32.4     01-14    141  |  106  |  4<L>  ----------------------------<  94  3.2<L>   |  26  |  0.61    Ca    7.9<L>      14 Jan 2022 08:02    TPro  6.4  /  Alb  2.6<L>  /  TBili  0.4  /  DBili  x   /  AST  22  /  ALT  14  /  AlkPhos  86  01-14        amylase   lipaseLipase, Serum: 45 U/L (01-10 @ 16:35)    RADIOLOGY & ADDITIONAL TESTS:

## 2022-01-15 ENCOUNTER — TRANSCRIPTION ENCOUNTER (OUTPATIENT)
Age: 77
End: 2022-01-15

## 2022-01-15 VITALS
DIASTOLIC BLOOD PRESSURE: 75 MMHG | SYSTOLIC BLOOD PRESSURE: 173 MMHG | RESPIRATION RATE: 20 BRPM | TEMPERATURE: 98 F | OXYGEN SATURATION: 98 % | HEART RATE: 78 BPM

## 2022-01-15 LAB
ALBUMIN SERPL ELPH-MCNC: 2.8 G/DL — LOW (ref 3.3–5)
ALP SERPL-CCNC: 98 U/L — SIGNIFICANT CHANGE UP (ref 30–120)
ALT FLD-CCNC: 25 U/L DA — SIGNIFICANT CHANGE UP (ref 10–60)
ANION GAP SERPL CALC-SCNC: 6 MMOL/L — SIGNIFICANT CHANGE UP (ref 5–17)
AST SERPL-CCNC: 22 U/L — SIGNIFICANT CHANGE UP (ref 10–40)
BASOPHILS # BLD AUTO: 0.02 K/UL — SIGNIFICANT CHANGE UP (ref 0–0.2)
BASOPHILS NFR BLD AUTO: 0.4 % — SIGNIFICANT CHANGE UP (ref 0–2)
BILIRUB SERPL-MCNC: 0.4 MG/DL — SIGNIFICANT CHANGE UP (ref 0.2–1.2)
BUN SERPL-MCNC: 10 MG/DL — SIGNIFICANT CHANGE UP (ref 7–23)
CALCIUM SERPL-MCNC: 8.5 MG/DL — SIGNIFICANT CHANGE UP (ref 8.4–10.5)
CHLORIDE SERPL-SCNC: 106 MMOL/L — SIGNIFICANT CHANGE UP (ref 96–108)
CO2 SERPL-SCNC: 31 MMOL/L — SIGNIFICANT CHANGE UP (ref 22–31)
CREAT SERPL-MCNC: 0.75 MG/DL — SIGNIFICANT CHANGE UP (ref 0.5–1.3)
EOSINOPHIL # BLD AUTO: 0.13 K/UL — SIGNIFICANT CHANGE UP (ref 0–0.5)
EOSINOPHIL NFR BLD AUTO: 2.5 % — SIGNIFICANT CHANGE UP (ref 0–6)
GLUCOSE SERPL-MCNC: 105 MG/DL — HIGH (ref 70–99)
HCT VFR BLD CALC: 34.2 % — LOW (ref 34.5–45)
HGB BLD-MCNC: 10.9 G/DL — LOW (ref 11.5–15.5)
IMM GRANULOCYTES NFR BLD AUTO: 0.2 % — SIGNIFICANT CHANGE UP (ref 0–1.5)
LYMPHOCYTES # BLD AUTO: 1.66 K/UL — SIGNIFICANT CHANGE UP (ref 1–3.3)
LYMPHOCYTES # BLD AUTO: 31.5 % — SIGNIFICANT CHANGE UP (ref 13–44)
MCHC RBC-ENTMCNC: 29.8 PG — SIGNIFICANT CHANGE UP (ref 27–34)
MCHC RBC-ENTMCNC: 31.9 GM/DL — LOW (ref 32–36)
MCV RBC AUTO: 93.4 FL — SIGNIFICANT CHANGE UP (ref 80–100)
MONOCYTES # BLD AUTO: 0.48 K/UL — SIGNIFICANT CHANGE UP (ref 0–0.9)
MONOCYTES NFR BLD AUTO: 9.1 % — SIGNIFICANT CHANGE UP (ref 2–14)
NEUTROPHILS # BLD AUTO: 2.97 K/UL — SIGNIFICANT CHANGE UP (ref 1.8–7.4)
NEUTROPHILS NFR BLD AUTO: 56.3 % — SIGNIFICANT CHANGE UP (ref 43–77)
NRBC # BLD: 0 /100 WBCS — SIGNIFICANT CHANGE UP (ref 0–0)
PLATELET # BLD AUTO: 285 K/UL — SIGNIFICANT CHANGE UP (ref 150–400)
POTASSIUM SERPL-MCNC: 4 MMOL/L — SIGNIFICANT CHANGE UP (ref 3.5–5.3)
POTASSIUM SERPL-SCNC: 4 MMOL/L — SIGNIFICANT CHANGE UP (ref 3.5–5.3)
PROT SERPL-MCNC: 6.4 G/DL — SIGNIFICANT CHANGE UP (ref 6–8.3)
RBC # BLD: 3.66 M/UL — LOW (ref 3.8–5.2)
RBC # FLD: 14.6 % — HIGH (ref 10.3–14.5)
SODIUM SERPL-SCNC: 143 MMOL/L — SIGNIFICANT CHANGE UP (ref 135–145)
WBC # BLD: 5.27 K/UL — SIGNIFICANT CHANGE UP (ref 3.8–10.5)
WBC # FLD AUTO: 5.27 K/UL — SIGNIFICANT CHANGE UP (ref 3.8–10.5)

## 2022-01-15 PROCEDURE — 97161 PT EVAL LOW COMPLEX 20 MIN: CPT

## 2022-01-15 PROCEDURE — 87635 SARS-COV-2 COVID-19 AMP PRB: CPT

## 2022-01-15 PROCEDURE — 99285 EMERGENCY DEPT VISIT HI MDM: CPT | Mod: 25

## 2022-01-15 PROCEDURE — 74177 CT ABD & PELVIS W/CONTRAST: CPT | Mod: MG

## 2022-01-15 PROCEDURE — 80053 COMPREHEN METABOLIC PANEL: CPT

## 2022-01-15 PROCEDURE — 81001 URINALYSIS AUTO W/SCOPE: CPT

## 2022-01-15 PROCEDURE — G1004: CPT

## 2022-01-15 PROCEDURE — 74250 X-RAY XM SM INT 1CNTRST STD: CPT

## 2022-01-15 PROCEDURE — 74018 RADEX ABDOMEN 1 VIEW: CPT

## 2022-01-15 PROCEDURE — 87086 URINE CULTURE/COLONY COUNT: CPT

## 2022-01-15 PROCEDURE — 83690 ASSAY OF LIPASE: CPT

## 2022-01-15 PROCEDURE — 93005 ELECTROCARDIOGRAM TRACING: CPT

## 2022-01-15 PROCEDURE — 85025 COMPLETE CBC W/AUTO DIFF WBC: CPT

## 2022-01-15 PROCEDURE — 96360 HYDRATION IV INFUSION INIT: CPT

## 2022-01-15 PROCEDURE — 87186 SC STD MICRODIL/AGAR DIL: CPT

## 2022-01-15 PROCEDURE — 87507 IADNA-DNA/RNA PROBE TQ 12-25: CPT

## 2022-01-15 PROCEDURE — 87077 CULTURE AEROBIC IDENTIFY: CPT

## 2022-01-15 PROCEDURE — 36415 COLL VENOUS BLD VENIPUNCTURE: CPT

## 2022-01-15 PROCEDURE — 85027 COMPLETE CBC AUTOMATED: CPT

## 2022-01-15 RX ORDER — NYSTATIN CREAM 100000 [USP'U]/G
1 CREAM TOPICAL
Qty: 45 | Refills: 0
Start: 2022-01-15 | End: 2022-01-29

## 2022-01-15 RX ORDER — LACTOBACILLUS ACIDOPHILUS 100MM CELL
1 CAPSULE ORAL
Qty: 20 | Refills: 0
Start: 2022-01-15 | End: 2022-01-24

## 2022-01-15 RX ORDER — CIPROFLOXACIN LACTATE 400MG/40ML
1 VIAL (ML) INTRAVENOUS
Qty: 6 | Refills: 0
Start: 2022-01-15 | End: 2022-01-17

## 2022-01-15 RX ADMIN — Medication 81 MILLIGRAM(S): at 11:33

## 2022-01-15 RX ADMIN — Medication 500 MILLIGRAM(S): at 05:46

## 2022-01-15 RX ADMIN — NYSTATIN CREAM 1 APPLICATION(S): 100000 CREAM TOPICAL at 15:48

## 2022-01-15 RX ADMIN — NYSTATIN CREAM 1 APPLICATION(S): 100000 CREAM TOPICAL at 05:47

## 2022-01-15 RX ADMIN — ENOXAPARIN SODIUM 40 MILLIGRAM(S): 100 INJECTION SUBCUTANEOUS at 11:33

## 2022-01-15 RX ADMIN — PRASUGREL 5 MILLIGRAM(S): 5 TABLET, FILM COATED ORAL at 11:33

## 2022-01-15 RX ADMIN — Medication 1 TABLET(S): at 15:48

## 2022-01-15 RX ADMIN — RANOLAZINE 500 MILLIGRAM(S): 500 TABLET, FILM COATED, EXTENDED RELEASE ORAL at 11:34

## 2022-01-15 RX ADMIN — FAMOTIDINE 40 MILLIGRAM(S): 10 INJECTION INTRAVENOUS at 11:33

## 2022-01-15 RX ADMIN — Medication 1 TABLET(S): at 05:46

## 2022-01-15 NOTE — DISCHARGE NOTE NURSING/CASE MANAGEMENT/SOCIAL WORK - NSDCPEWEB_GEN_ALL_CORE
[facility] for Tobacco Control website --- [email or web address] website --- [email or web address]

## 2022-01-15 NOTE — PROGRESS NOTE ADULT - PROBLEM SELECTOR PLAN 3
no active management needed as per urology , urologist will follow

## 2022-01-15 NOTE — PROGRESS NOTE ADULT - SUBJECTIVE AND OBJECTIVE BOX
Patient seen and examined.  She states that she is feeling well and has been tolerating a diet without nausea or abdominal pain.  Reports having more formed stool and continues to pass flatus.  Stool cultures positive for E. coli, consistent with presumed diagnosis of enteritis.  I explained to Ms. Wilson that it is likely that the stricture appreciated on the small bowel series was within the region of enteritis and would therefore be expected to resolve as the enteritis resolves.  As such, I recommend continued management of her presumed enteritis per GI recommendations and that she have an outpatient follow-up CT enterography to assess for resolution of the stricture and rule out any persistent chronic underlying pathology (i.e., a mass versus Crohn's').  Will sign off as there is no role for any general surgery intervention at this time.  Please call with questions.  Please have the patient follow-up with Dr. Pleitez once discharged to arrange for the outpatient follow-up imaging.

## 2022-01-15 NOTE — PROGRESS NOTE ADULT - ASSESSMENT
The patient is a 76 year old female with a history of HTN, HL, TYLOR, COPD, CAD s/p PCI, cervical cancer s/p ROBEROT, nephrolithiasis s/p stent and nephrostomy tube who presents with diarrhea in the setting of possible SBO.    Plan:  - Continue aspirin 81 mg daily  - Continue prasugrel 5 mg daily  - Continue metoprolol succinate 100 mg daily  - Continue ranolazine 500 mg bid  - Continue atorvastatin 40 mg daily  - Hold furosemide for now - resume on discharge  - Surgery and GI follow-up
76 F with PMhx of bronchitis, emphysema, TYLOR, CAD (coronary artery disease),ch diastolic, heart failure   4/27/2021 at [facility] ,prompted left cardiac cath with coronary artery stenting (LAD) spinal stenosis, Cervical Ca s/p ROBERTO, s/p Appendectomy, s/p cholecystectomy, s/p left lumpectomy,nephrolithiasis,withobstruction and left hydronephrosis, had Left percutaneous nephrolithotomy and stent in 8/21,Left ureteral stent placement.in 11/21Left ureteroscopy with laser lithotripsy, retrograde pyelogram and ureteral stent exchange, presents on 1/10 to [place] ED,c/o watery nonbloody diarrhea x 3 days. Reports that she ate sushi friday, symptoms started the next day but mild, then increased. Tried immodium without relief. Denies recent abx use. Has been eating bland diet. Denies f/c, sore throat, cough, CP, SOB, vomiting, urinary complaints. No covid vaccine.PMD: Scarlet Kuhn  In Ed had CT abd pelvis  < from: CT Abdomen and Pelvis w/ IV Cont (01.10.22 @ 18:21) >  1.  Moderate dilatation of the proximal small bowel with probable   transition point in the RIGHT lower quadrant. Findings suggest low to   moderate grade small bowel obstruction. Fluid and fat stranding within   the mesentery is somewhat concerning and follow-up studies are   recommended. Consider surgical consult.  2.  ModerateLEFT hydronephrosis and dilatation of the LEFT renal pelvis   with possible stricture at the LEFT UPJ.    In ED afebrile, normotensive, admitted for further management for     SBO (small bowel obstruction). likely enteritis-   Hydronephrosis, left.  nephrolithiasis  CAD s/p PCI  COPD  CH Diastolic heart failure.  h/o multiple abdominal surg  surg, GI, urology consult noted- had GI series- Mildly prominent loops of small bowel are noted within the mid to lower   abdomen, similar to prior studies. Differential again includes ileus   versus partial obstruction.  as per surg can start on clears  obesity  Fungal dermatitis in abdominal Fold  
The patient is a 76 year old female with a history of HTN, HL, TYLOR, COPD, CAD s/p PCI, cervical cancer s/p ROBERTO, nephrolithiasis s/p stent and nephrostomy tube who presents with diarrhea in the setting of possible SBO.    1/15/22  Seen at [facility]  Patient sitting in chair, eating lunch  No complaints  Feeling better    Plan:  - Continue aspirin 81 mg daily  - Continue prasugrel 5 mg daily  - Continue metoprolol succinate 100 mg daily  - Continue ranolazine 500 mg bid  - Continue atorvastatin 40 mg daily  - Hold furosemide for now - resume on discharge  - Follow labs  - Surgery and GI follow-up
The patient is a 76 year old female with a history of HTN, HL, TYLOR, COPD, CAD s/p PCI, cervical cancer s/p ROBERTO, nephrolithiasis s/p stent and nephrostomy tube who presents with diarrhea in the setting of possible SBO.    Plan:  - Continue aspirin 81 mg daily  - Continue prasugrel 5 mg daily  - Continue metoprolol succinate 100 mg daily  - Continue ranolazine 500 mg bid  - Continue atorvastatin 40 mg daily  - Hold furosemide for now - resume on discharge  - Surgery and GI follow-up
enterisits  sbo    clinically improved ; can dc home today    Advanced care planning was discussed with patient and family.  Advanced care planning forms were reviewed and discussed.  Risks, benefits and alternatives of gastroenterologic procedures were discussed in detail and all questions were answered.    30 minutes spent.  
enterisits  sbo    plan  npo  surgery on the case  ppi once aday  f/u gi pcr  hold on antibiotics  axr today  may be ckeared for diet if xray is okay  in and out  oob to chair  to follow up  d/w patient    Advanced care planning was discussed with patient and family.  Advanced care planning forms were reviewed and discussed.  Risks, benefits and alternatives of gastroenterologic procedures were discussed in detail and all questions were answered.    30 minutes spent.  
76 F c/o watery nonbloody diarrhea x 3 days. Reports that she ate sushi friday, symptoms started the next day but mild, then increased. Tried immodium without relief. Denies recent abx use. Has been eating bland diet. Denies f/c, sore throat, cough    sbo  gi and surgery following  NPO  IVF  monitor VS and HD and Sat  vs noted  on RA  pain assessment  serial ABD EXAM  bronchodilator PRN  
76 F c/o watery nonbloody diarrhea x 3 days. Reports that she ate sushi friday, symptoms started the next day but mild, then increased. Tried immodium without relief. Denies recent abx use. Has been eating bland diet. Denies f/c, sore throat, cough    sbo  gi and surgery following  low fiber diet  monitor VS and HD and Sat  vs noted  on RA  pain assessment  serial ABD EXAM  bronchodilator PRN
76 F c/o watery nonbloody diarrhea x 3 days. Reports that she ate sushi friday, symptoms started the next day but mild, then increased. Tried immodium without relief. Denies recent abx use. Has been eating bland diet. Denies f/c, sore throat, cough    sbo  gi and surgery following  on IVF and PO clears  monitor VS and HD and Sat  vs noted  on RA  pain assessment  serial ABD EXAM  bronchodilator PRN
A/P: 77 y/o WF with PSBO vs enteritis, clinically improving,    continue current care  advance diet as tolerated - clears to fulls  serial abd exams  GI f/u and management  f/u with patient's  as OP per Urology consultation for LEFT moderate hydro on CT    Case & plan discussed with Dr. PETRONA Arteaga.
The patient is a 76 year old female with a history of HTN, HL, TYLOR, COPD, CAD s/p PCI, cervical cancer s/p ROBERTO, nephrolithiasis s/p stent and nephrostomy tube who presents with diarrhea in the setting of possible SBO.    Plan:  - Continue aspirin 81 mg daily  - Continue prasugrel 5 mg daily  - Continue metoprolol succinate 100 mg daily  - Continue ranolazine 500 mg bid  - Continue atorvastatin 40 mg daily  - Hold furosemide for now  - Continue IV fluids as needed  - Surgery and GI follow-up  - If plan requires any surgical intervention, would hold prasugrel (patient is about 9 months out from PCI)
enterisits  sbo    plan  full liquid  surgery on the case  ppi once aday  f/u gi pcr  hold on antibiotics  may be ckeared for diet if xray is okay  in and out  oob to chair  to follow up  d/w patient    Advanced care planning was discussed with patient and family.  Advanced care planning forms were reviewed and discussed.  Risks, benefits and alternatives of gastroenterologic procedures were discussed in detail and all questions were answered.    30 minutes spent.  
76 F c/o watery nonbloody diarrhea x 3 days. Reports that she ate sushi friday, symptoms started the next day but mild, then increased. Tried immodium without relief. Denies recent abx use. Has been eating bland diet. Denies f/c, sore throat, cough    sbo  gi and surgery following  on IVF and PO liquid diet  monitor VS and HD and Sat  vs noted  on RA  pain assessment  serial ABD EXAM  bronchodilator PRN
A/P: 75 y/o WF with enteritis, diarrhea resolved, + stool cx, clinically improving,    Continue current care  Continue regular diet as tolerated   GI/DVT prophylaxis  GI recommendations noted  f/u with GI or Surgery/Dr. DARLIN Pleitez for OP enterography/evaluation of SB  no acute surgical intervention warranted at this time    Case & plan discussed with Dr. PETRONA Arteaga.  
enterisits  sbo    plan  full liquid  surgery on the case  ppi once aday  f/u gi pcr  hold on antibiotics  may be ckeared for diet if xray is okay  in and out  oob to chair  to follow up  d/w patient    Advanced care planning was discussed with patient and family.  Advanced care planning forms were reviewed and discussed.  Risks, benefits and alternatives of gastroenterologic procedures were discussed in detail and all questions were answered.    30 minutes spent.  
76 F with PMhx of bronchitis, emphysema, TYLOR, CAD (coronary artery disease),ch diastolic, heart failure   4/27/2021 at [facility] ,prompted left cardiac cath with coronary artery stenting (LAD) spinal stenosis, Cervical Ca s/p ROBERTO, s/p Appendectomy, s/p cholecystectomy, s/p left lumpectomy,nephrolithiasis,withobstruction and left hydronephrosis, had Left percutaneous nephrolithotomy and stent in 8/21,Left ureteral stent placement.in 11/21Left ureteroscopy with laser lithotripsy, retrograde pyelogram and ureteral stent exchange, presents on 1/10 to [place] ED,c/o watery nonbloody diarrhea x 3 days. Reports that she ate sushi friday, symptoms started the next day but mild, then increased. Tried immodium without relief. Denies recent abx use. Has been eating bland diet. Denies f/c, sore throat, cough, CP, SOB, vomiting, urinary complaints. No covid vaccine.PMD: Scarlet Kuhn  In Ed had CT abd pelvis  < from: CT Abdomen and Pelvis w/ IV Cont (01.10.22 @ 18:21) >  1.  Moderate dilatation of the proximal small bowel with probable   transition point in the RIGHT lower quadrant. Findings suggest low to   moderate grade small bowel obstruction. Fluid and fat stranding within   the mesentery is somewhat concerning and follow-up studies are   recommended. Consider surgical consult.  2.  ModerateLEFT hydronephrosis and dilatation of the LEFT renal pelvis   with possible stricture at the LEFT UPJ.    In ED afebrile, normotensive, admitted for further management for     SBO (small bowel obstruction). likely enteritis-   Hydronephrosis, left.  nephrolithiasis  CAD s/p PCI  COPD  CH Diastolic heart failure.  h/o multiple abdominal surg  surg, GI, urology consult noted- had GI series- Mildly prominent loops of small bowel are noted within the mid to lower   abdomen, similar to prior studies. Differential again includes ileus   versus partial obstruction.  as per surg can start on clears
76 F with PMhx of bronchitis, emphysema, TYLOR, CAD (coronary artery disease),ch diastolic, heart failure   4/27/2021 at [facility] ,prompted left cardiac cath with coronary artery stenting (LAD) spinal stenosis, Cervical Ca s/p ROBERTO, s/p Appendectomy, s/p cholecystectomy, s/p left lumpectomy,nephrolithiasis,withobstruction and left hydronephrosis, had Left percutaneous nephrolithotomy and stent in 8/21,Left ureteral stent placement.in 11/21Left ureteroscopy with laser lithotripsy, retrograde pyelogram and ureteral stent exchange, presents on 1/10 to [place] ED,c/o watery nonbloody diarrhea x 3 days. Reports that she ate sushi friday, symptoms started the next day but mild, then increased. Tried immodium without relief. Denies recent abx use. Has been eating bland diet. Denies f/c, sore throat, cough, CP, SOB, vomiting, urinary complaints. No covid vaccine.PMD: Scarlet Kuhn  In Ed had CT abd pelvis  < from: CT Abdomen and Pelvis w/ IV Cont (01.10.22 @ 18:21) >  1.  Moderate dilatation of the proximal small bowel with probable   transition point in the RIGHT lower quadrant. Findings suggest low to   moderate grade small bowel obstruction. Fluid and fat stranding within   the mesentery is somewhat concerning and follow-up studies are   recommended. Consider surgical consult.  2.  ModerateLEFT hydronephrosis and dilatation of the LEFT renal pelvis   with possible stricture at the LEFT UPJ.    In ED afebrile, normotensive, admitted for further management for     SBO (small bowel obstruction). likely enteritis due to EPEC started on ciprofloxacin, diet advance to low fibre  hypokalemia repleted   Hydronephrosis, left.with nephrolithiasis with hematuria- urology consult noted, no active management at present - out pt f up  nephrolithiasis  CAD s/p PCI  COPD  CH Diastolic heart failure.  anemia of ch disease  h/o multiple abdominal surg  surg, GI, urology consult noted- had GI series- Mildly prominent loops of small bowel are noted within the mid to lower   abdomen, similar to prior studies. Differential again includes ileus   versus partial obstruction.  obesity  Fungal dermatitis in abdominal Fold improving  Surg and GI f up noted  diarrhea improved   dc plan on cipro    
76 F with PMhx of bronchitis, emphysema, TYLOR, CAD (coronary artery disease),ch diastolic, heart failure   4/27/2021 at [facility] ,prompted left cardiac cath with coronary artery stenting (LAD) spinal stenosis, Cervical Ca s/p ROBERTO, s/p Appendectomy, s/p cholecystectomy, s/p left lumpectomy,nephrolithiasis,withobstruction and left hydronephrosis, had Left percutaneous nephrolithotomy and stent in 8/21,Left ureteral stent placement.in 11/21Left ureteroscopy with laser lithotripsy, retrograde pyelogram and ureteral stent exchange, presents on 1/10 to [place] ED,c/o watery nonbloody diarrhea x 3 days. Reports that she ate sushi friday, symptoms started the next day but mild, then increased. Tried immodium without relief. Denies recent abx use. Has been eating bland diet. Denies f/c, sore throat, cough, CP, SOB, vomiting, urinary complaints. No covid vaccine.PMD: Scarlet Kuhn  In Ed had CT abd pelvis  < from: CT Abdomen and Pelvis w/ IV Cont (01.10.22 @ 18:21) >  1.  Moderate dilatation of the proximal small bowel with probable   transition point in the RIGHT lower quadrant. Findings suggest low to   moderate grade small bowel obstruction. Fluid and fat stranding within   the mesentery is somewhat concerning and follow-up studies are   recommended. Consider surgical consult.  2.  ModerateLEFT hydronephrosis and dilatation of the LEFT renal pelvis   with possible stricture at the LEFT UPJ.    In ED afebrile, normotensive, admitted for further management for     SBO (small bowel obstruction). likely enteritis due to EPEC started on ciprofloxacin, diet advance to low fibre  hypokalemia repleted   Hydronephrosis, left.  nephrolithiasis  CAD s/p PCI  COPD  CH Diastolic heart failure.  h/o multiple abdominal surg  surg, GI, urology consult noted- had GI series- Mildly prominent loops of small bowel are noted within the mid to lower   abdomen, similar to prior studies. Differential again includes ileus   versus partial obstruction.  obesity  Fungal dermatitis in abdominal Fold  Surg and GI f up

## 2022-01-15 NOTE — PROGRESS NOTE ADULT - PROBLEM SELECTOR PLAN 1
iv fluids, no need NG as per surg, surg on admission, GI consult noted had GI series advised start clears as per surg  low fibre diet to reg diet
iv fluids, no need NG as per surg, surg on admission, GI consult noted had GI series advised start clears as per surg  low fibre diet
npo, iv fluids, no need NG as per surg, surg on admission, GI consult noted had GI series advised start clears as per surg
npo, iv fluids, no need NG as per surg, surg , GI consult noted had GI series advised start clears as per surg

## 2022-01-15 NOTE — DISCHARGE NOTE PROVIDER - NSDCMRMEDTOKEN_GEN_ALL_CORE_FT
aspirin 81 mg oral tablet: orally once a day  ciprofloxacin 500 mg oral tablet: 1 tab(s) orally every 12 hours  famotidine 40 mg oral tablet: 1 tab(s) orally 2 times a day  furosemide 20 mg oral tablet: 1 tab(s) orally once a day  lactobacillus acidophilus oral capsule: 1 tab(s) orally 2 times a day   Metoprolol Succinate  mg oral tablet, extended release: 1 tab(s) orally once a day (at bedtime)  nystatin 100,000 units/g topical powder: 1 application topically 3 times a day  prasugrel 5 mg oral tablet: 1 tab(s) orally once a day  ranolazine 500 mg oral tablet, extended release: 1 tab(s) orally 2 times a day  rosuvastatin 20 mg oral tablet: 1 tab(s) orally once a day (at bedtime)

## 2022-01-15 NOTE — DISCHARGE NOTE NURSING/CASE MANAGEMENT/SOCIAL WORK - NSDCPEFALRISK_GEN_ALL_CORE
For information on Fall & Injury Prevention, visit: [email or web address] OR  [email or web address] OR  https://www.cdc.gov/steadi/patient.html

## 2022-01-15 NOTE — PROGRESS NOTE ADULT - SUBJECTIVE AND OBJECTIVE BOX
INTERVAL HPI/OVERNIGHT EVENTS:  events noted  MEDICATIONS  (STANDING):  aspirin  chewable 81 milliGRAM(s) Oral daily  atorvastatin 80 milliGRAM(s) Oral at bedtime  ciprofloxacin     Tablet 500 milliGRAM(s) Oral every 12 hours  enoxaparin Injectable 40 milliGRAM(s) SubCutaneous daily  famotidine    Tablet 40 milliGRAM(s) Oral two times a day  lactobacillus acidophilus 1 Tablet(s) Oral three times a day  metoprolol succinate  milliGRAM(s) Oral daily  nystatin Powder 1 Application(s) Topical three times a day  prasugrel 5 milliGRAM(s) Oral daily  ranolazine 500 milliGRAM(s) Oral two times a day  sodium chloride 0.9%. 1000 milliLiter(s) (75 mL/Hr) IV Continuous <Continuous>    MEDICATIONS  (PRN):  ipratropium    for Nebulization 500 MICROGram(s) Nebulizer every 6 hours PRN Bronchospasm  ondansetron Injectable 4 milliGRAM(s) IV Push every 4 hours PRN Nausea and/or Vomiting      Allergies    adhesives (Rash)  No Known Drug Allergies    Intolerances        Review of Systems:    General:  No wt loss, fevers, chills, night sweats,fatigue,   Eyes:  Good vision, no reported pain  ENT:  No sore throat, pain, runny nose, dysphagia  CV:  No pain, palpitatioins, hypo/hypertension  Resp:  No dyspnea, cough, tachypnea, wheezing  GI:  No pain, No nausea, No vomiting, No diarrhea, No constipatiion, No weight loss, No fever, No pruritis, No rectal bleeding, No tarry stools, No dysphagia,  :  No pain, bleeding, incontinence, nocturia  Muscle:  No pain, weakness  Neuro:  No weakness, tingling, memory problems  Psych:  No fatigue, insomnia, mood problems, depression  Endocrine:  No polyuria, polydypsia, cold/heat intolerance  Heme:  No petechiae, ecchymosis, easy bruisability  Skin:  No rash, tattoos, scars, edema      Vital Signs Last 24 Hrs  T(C): 36.6 (15 Yannick 2022 08:32), Max: 36.7 (2022 15:48)  T(F): 97.8 (15 Yannick 2022 08:32), Max: 98 (2022 15:48)  HR: 78 (15 Yannick 2022 08:32) (70 - 78)  BP: 173/75 (15 Yannick 2022 08:32) (158/75 - 173/75)  BP(mean): --  RR: 20 (15 Yannick 2022 08:32) (16 - 20)  SpO2: 98% (15 Yannick 2022 08:32) (95% - 98%)    PHYSICAL EXAM:    Constitutional: NAD, well-developed  HEENT: EOMI, throat clear  Neck: No LAD, supple  Respiratory: CTA and P  Cardiovascular: S1 and S2, RRR, no M  Gastrointestinal: BS+, soft, NT/ND, neg HSM,  Extremities: No peripheral edema, neg clubing, cyanosis  Vascular: 2+ peripheral pulses  Neurological: A/O x 3, no focal deficits  Psychiatric: Normal mood, normal affect  Skin: No rashes      LABS:                        10.9   5.27  )-----------( 285      ( 15 Yannick 2022 06:57 )             34.2     01-15    143  |  106  |  10  ----------------------------<  105<H>  4.0   |  31  |  0.75    Ca    8.5      15 Yannick 2022 06:57    TPro  6.4  /  Alb  2.8<L>  /  TBili  0.4  /  DBili  x   /  AST  22  /  ALT  25  /  AlkPhos  98  01-15      Urinalysis Basic - ( 2022 19:07 )    Color: Brown / Appearance: Slightly Turbid / S.010 / pH: x  Gluc: x / Ketone: Negative  / Bili: Negative / Urobili: Negative mg/dL   Blood: x / Protein: 30 mg/dL / Nitrite: Negative   Leuk Esterase: Moderate / RBC: >50 /HPF / WBC 3-5   Sq Epi: x / Non Sq Epi: Occasional / Bacteria: Negative        RADIOLOGY & ADDITIONAL TESTS:

## 2022-01-15 NOTE — PROGRESS NOTE ADULT - REASON FOR ADMISSION
diarrhea

## 2022-01-15 NOTE — PROGRESS NOTE ADULT - TIME BILLING
I have discussed care plan with patient and HCP,expressed understanding of problems treatment and their effect and side effects, alternatives in detail,I have asked if they have any questions and concerns and appropriately addressed them to best of my ability  Reviewed all diagonostic tests, lab results and drug drug interactions, and medications

## 2022-01-15 NOTE — PROGRESS NOTE ADULT - PROVIDER SPECIALTY LIST ADULT
Cardiology
Cardiology
Gastroenterology
Pulmonology
Pulmonology
Surgery
Surgery
Pulmonology
Surgery
Surgery
Cardiology
Cardiology
Gastroenterology
Pulmonology
Internal Medicine
[phone]

## 2022-01-15 NOTE — DISCHARGE NOTE PROVIDER - HOSPITAL COURSE
76 F with PMhx of bronchitis, emphysema, TYLOR, CAD (coronary artery disease),ch diastolic, heart failure   4/27/2021 at [facility] ,prompted left cardiac cath with coronary artery stenting (LAD) spinal stenosis, Cervical Ca s/p ROBERTO, s/p Appendectomy, s/p cholecystectomy, s/p left lumpectomy,nephrolithiasis,withobstruction and left hydronephrosis, had Left percutaneous nephrolithotomy and stent in 8/21,Left ureteral stent placement.in 11/21Left ureteroscopy with laser lithotripsy, retrograde pyelogram and ureteral stent exchange, presents on 1/10 to [place] ED,c/o watery nonbloody diarrhea x 3 days. Reports that she ate sushi friday, symptoms started the next day but mild, then increased. Tried immodium without relief. Denies recent abx use. Has been eating bland diet. Denies f/c, sore throat, cough, CP, SOB, vomiting, urinary complaints. No covid vaccine.PMD: Scarlet Kuhn  In Ed had CT abd pelvis  < from: CT Abdomen and Pelvis w/ IV Cont (01.10.22 @ 18:21) >  1.  Moderate dilatation of the proximal small bowel with probable   transition point in the RIGHT lower quadrant. Findings suggest low to   moderate grade small bowel obstruction. Fluid and fat stranding within   the mesentery is somewhat concerning and follow-up studies are   recommended. Consider surgical consult.  2.  ModerateLEFT hydronephrosis and dilatation of the LEFT renal pelvis   with possible stricture at the LEFT UPJ.    In ED afebrile, normotensive, admitted for further management for     SBO (small bowel obstruction). likely enteritis due to EPEC started on ciprofloxacin, diet advance to low fibre  hypokalemia repleted   Hydronephrosis, left.with nephrolithiasis with hematuria- urology consult noted, no active management at present - out pt f up  nephrolithiasis  CAD s/p PCI  COPD  CH Diastolic heart failure.  anemia of ch disease  h/o multiple abdominal surg  surg, GI, urology consult noted- had GI series- Mildly prominent loops of small bowel are noted within the mid to lower   abdomen, similar to prior studies. Differential again includes ileus   versus partial obstruction.  obesity  Fungal dermatitis in abdominal Fold improving  Surg and GI f up noted  diarrhea improved   dc plan on cipro within functional limits

## 2022-01-15 NOTE — DISCHARGE NOTE PROVIDER - NSDCCPCAREPLAN_GEN_ALL_CORE_FT
PRINCIPAL DISCHARGE DIAGNOSIS  Diagnosis: SBO (small bowel obstruction)  Assessment and Plan of Treatment: improved      SECONDARY DISCHARGE DIAGNOSES  Diagnosis: Hydronephrosis, left  Assessment and Plan of Treatment: f up with urology OP    Diagnosis: Enteritis  Assessment and Plan of Treatment: EPEC, cipro for 3 days

## 2022-01-15 NOTE — DISCHARGE NOTE NURSING/CASE MANAGEMENT/SOCIAL WORK - NSDCPEEMAIL_GEN_ALL_CORE
[facility] for Tobacco Control email [email or web address]

## 2022-01-15 NOTE — DISCHARGE NOTE NURSING/CASE MANAGEMENT/SOCIAL WORK - PATIENT PORTAL LINK FT
You can access the FollowMyHealth Patient Portal offered by [facility] by registering at the following website: [email or web address]. By joining [email or web address]’s FollowMyHealth portal, you will also be able to view your health information using other applications (apps) compatible with our system.

## 2022-01-15 NOTE — PROGRESS NOTE ADULT - SUBJECTIVE AND OBJECTIVE BOX
Patient is a 76y Female with a known history of :  SBO (small bowel obstruction) [K56.609]    CAD (coronary artery disease) [I25.10]    Hydronephrosis [N13.30]    COPD with emphysema [J43.9]      HPI:  76 F with PMhx of bronchitis, emphysema, TYLOR, CAD (coronary artery disease),ch diastolic, heart failure   4/27/2021 at [facility] ,prompted left cardiac cath with coronary artery stenting (LAD) spinal stenosis, Cervical Ca s/p ROBERTO, s/p Appendectomy, s/p cholecystectomy, s/p left lumpectomy,nephrolithiasis,withobstruction and left hydronephrosis, had Left percutaneous nephrolithotomy and stent in 8/21,Left ureteral stent placement.in 11/21Left ureteroscopy with laser lithotripsy, retrograde pyelogram and ureteral stent exchange, presents on 1/10 to [place] ED,c/o watery nonbloody diarrhea x 3 days. Reports that she ate sushi friday, symptoms started the next day but mild, then increased. Tried immodium without relief. Denies recent abx use. Has been eating bland diet. Denies f/c, sore throat, cough, CP, SOB, vomiting, urinary complaints. No covid vaccine.PMD: Scarlet Kuhn  In Ed had CT abd pelvis  < from: CT Abdomen and Pelvis w/ IV Cont (01.10.22 @ 18:21) >  1.  Moderate dilatation of the proximal small bowel with probable   transition point in the RIGHT lower quadrant. Findings suggest low to   moderate grade small bowel obstruction. Fluid and fat stranding within   the mesentery is somewhat concerning and follow-up studies are   recommended. Consider surgical consult.  2.  ModerateLEFT hydronephrosis and dilatation of the LEFT renal pelvis   with possible stricture at the LEFT UPJ.    In ED afebrile, normotensive, admitted for further management for     SBO (small bowel obstruction).    Hydronephrosis, left.  nephrolithiasis  CAD s/p PCI  COPD  CH Diastolic heart failure.  h/o multiple abdominal surg   (10 Yannick 2022 21:23)      REVIEW OF SYSTEMS:    CONSTITUTIONAL: No fever, weight loss, or fatigue  EYES: No eye pain, visual disturbances, or discharge  ENMT:  No difficulty hearing, tinnitus, vertigo; No sinus or throat pain  NECK: No pain or stiffness  BREASTS: No pain, masses, or nipple discharge  RESPIRATORY: No cough, wheezing, chills or hemoptysis; No shortness of breath  CARDIOVASCULAR: No chest pain, palpitations, dizziness, or leg swelling  GASTROINTESTINAL: No abdominal or epigastric pain. No nausea, vomiting, or hematemesis; No diarrhea or constipation. No melena or hematochezia.  GENITOURINARY: No dysuria, frequency, hematuria, or incontinence  NEUROLOGICAL: No headaches, memory loss, loss of strength, numbness, or tremors  SKIN: No itching, burning, rashes, or lesions   LYMPH NODES: No enlarged glands  ENDOCRINE: No heat or cold intolerance; No hair loss  MUSCULOSKELETAL: No joint pain or swelling; No muscle, back, or extremity pain  PSYCHIATRIC: No depression, anxiety, mood swings, or difficulty sleeping  HEME/LYMPH: No easy bruising, or bleeding gums  ALLERGY AND IMMUNOLOGIC: No hives or eczema    MEDICATIONS  (STANDING):  aspirin  chewable 81 milliGRAM(s) Oral daily  atorvastatin 80 milliGRAM(s) Oral at bedtime  ciprofloxacin     Tablet 500 milliGRAM(s) Oral every 12 hours  enoxaparin Injectable 40 milliGRAM(s) SubCutaneous daily  famotidine    Tablet 40 milliGRAM(s) Oral two times a day  lactobacillus acidophilus 1 Tablet(s) Oral three times a day  metoprolol succinate  milliGRAM(s) Oral daily  nystatin Powder 1 Application(s) Topical three times a day  prasugrel 5 milliGRAM(s) Oral daily  ranolazine 500 milliGRAM(s) Oral two times a day  sodium chloride 0.9%. 1000 milliLiter(s) (75 mL/Hr) IV Continuous <Continuous>    MEDICATIONS  (PRN):  ipratropium    for Nebulization 500 MICROGram(s) Nebulizer every 6 hours PRN Bronchospasm  ondansetron Injectable 4 milliGRAM(s) IV Push every 4 hours PRN Nausea and/or Vomiting      ALLERGIES: adhesives (Rash)  No Known Drug Allergies      FAMILY HISTORY:  Family history of kidney stone (Sibling)    Family history of cervical cancer (Mother)    FH: diverticulitis (Mother)        Social history:  Alochol:   Smoking:   Drug Use:   Marital Status:     PHYSICAL EXAMINATION:  -----------------------------  T(C): 36.6 (01-15-22 @ 08:32), Max: 36.7 (01-14-22 @ 15:48)  HR: 78 (01-15-22 @ 08:32) (70 - 78)  BP: 173/75 (01-15-22 @ 08:32) (158/75 - 173/75)  RR: 20 (01-15-22 @ 08:32) (16 - 20)  SpO2: 98% (01-15-22 @ 08:32) (95% - 98%)  Wt(kg): --    01-14 @ 07:01  -  01-15 @ 07:00  --------------------------------------------------------  IN:    Oral Fluid: 660 mL    sodium chloride 0.9%: 375 mL  Total IN: 1035 mL    OUT:  Total OUT: 0 mL    Total NET: 1035 mL            Constitutional: well developed, normal appearance, well groomed, well nourished, no deformities and no acute distress.   Eyes: the conjunctiva exhibited no abnormalities and the eyelids demonstrated no xanthelasmas.   HEENT: normal oral mucosa, no oral pallor and no oral cyanosis.   Neck: normal jugular venous A waves present, normal jugular venous V waves present and no jugular venous jules A waves.   Pulmonary: no respiratory distress, normal respiratory rhythm and effort, no accessory muscle use and lungs were clear to auscultation bilaterally.   Cardiovascular: heart rate and rhythm were normal, normal S1 and S2 and no murmur, gallop, rub, heave or thrill are present.   Musculoskeletal: the gait could not be assessed.   Extremities: no clubbing of the fingernails, no localized cyanosis, no petechial hemorrhages and no ischemic changes. Right trace edema  Skin: normal skin color and pigmentation, no rash, no venous stasis, no skin lesions, no skin ulcer and no xanthoma was observed.   Psychiatric: oriented to person, place, and time, the affect was normal, the mood was normal and not feeling anxious.     LABS:   --------  01-15    143  |  106  |  10  ----------------------------<  105<H>  4.0   |  31  |  0.75    Ca    8.5      15 Yannick 2022 06:57    TPro  6.4  /  Alb  2.8<L>  /  TBili  0.4  /  DBili  x   /  AST  22  /  ALT  25  /  AlkPhos  98  01-15                         10.9   5.27  )-----------( 285      ( 15 Yannick 2022 06:57 )             34.2             Culture Results:   Rotavirus A  Enteropathogenic E. coli (EPEC)  DETECTED by PCR  *******Please Note:*******  GI panel PCR evaluates for:  Campylobacter, Plesiomonas shigelloides, Salmonella,  Vibrio, Yersinia enterocolitica, Enteroaggregative  Escherichia coli (EAEC), Enteropathogenic E.coli (EPEC),  Enterotoxigenic E. coli (ETEC) lt/st, Shiga-like  toxin-producing E. coli (STEC) stx1/stx2,  Shigella/ Enteroinvasive E. coli (EIEC), Cryptosporidium,  Cyclospora cayetanensis, Entamoeba histolytica,  Giardia lamblia, Adenovirus F 40/41, Astrovirus,  Norovirus GI/GII, Rotavirus A, Sapovirus (01-13 @ 16:49)    01-13 @ 16:49    Organism --   Gram Stain Blood -- Gram Stain --  Specimen Source .Stool Feces  Culture-Blood --        Radiology:

## 2022-01-15 NOTE — DISCHARGE NOTE PROVIDER - CARE PROVIDER_API CALL
Lazaro Busby ()  Internal Medicine  [address] [place] [address]  Phone: [phone]  Fax: [phone]  Follow Up Time:     Tamir Pleitez)  Surgery  [address] [place] [address], [place] [id number]  Phone: [phone]  Fax: [phone]  Follow Up Time:     Kriss Miranda)  Urology  [address] [place] [address], [address]  [place] [address]  Phone: [phone]  Fax: [phone]  Follow Up Time:

## 2022-01-15 NOTE — DISCHARGE NOTE PROVIDER - CARE PROVIDERS DIRECT ADDRESSES
,DirectAddress_Unknown,[email or web address]

## 2022-01-15 NOTE — PROGRESS NOTE ADULT - SUBJECTIVE AND OBJECTIVE BOX
Date/Time Patient Seen:  		  Referring MD:   Data Reviewed	       Patient is a 76y old  Female who presents with a chief complaint of diarrhea (15 Yannick 2022 10:13)      Subjective/HPI     PAST MEDICAL & SURGICAL HISTORY:  Renal colic    Malignant neoplasm of cervix, unspecified site  s/p ROBERTO 1978    Smoker  1/2 pack X 55 years    HTN (hypertension)  diagnosed 10/2017    Obesity    Emphysema lung  mild, on CT scan 2017    Proteinuria    Renal calculus, bilateral    Morbid obesity with BMI of 40.0-44.9, adult    TYLOR (obstructive sleep apnea)  as per [facility] criteria; Did not complete sleep study yet    Lumbar herniated disc    Spinal stenosis of lumbar region    CAD (coronary artery disease)  1 stent (D1) - 4/21 - Dr. Metcalf    Chronic bronchitis    Cataract    History of unstable angina  last admission 4/27/2021 at [facility]   prompted left cardiac cath with coronary artery stenting (LAD)   on prasugrel   and on aspirin   last nitro since april 2021  cath done by Dr. Dwight Metcalf    Bulging lumbar disc    History of sciatica    History of spinal stenosis  dx 2008    Snores  sleep study 6/2021, no result    Calculus of kidney    H/O heart failure  last admission 2019  EF 50% (4/2021)    History of blood transfusion    History of appendectomy  1954    H/O abdominal hysterectomy  [facility] 1978  cervical cancer   no radiation no chemo    H/O lithotripsy  [facility] 1986 left, 2004 right, 2007 left    S/P breast lumpectomy  left, benign 1998    Prolapse, uterovaginal  s/p TVT Sling 2000    S/P cholecystectomy  2005    S/P ERCP  2006    S/P cataract surgery  bilateral, 2015    S/P CABG x 4  5/31/2019, Dr. Goncalves at [facility]    S/P colonoscopy with polypectomy    Nephrostomy status  percutaneous nephrolithotomy with nephrostomy tube for removal of right kidney stone (2017), left nephrostomy tube placement 8/5/2021          Medication list         MEDICATIONS  (STANDING):  aspirin  chewable 81 milliGRAM(s) Oral daily  atorvastatin 80 milliGRAM(s) Oral at bedtime  ciprofloxacin     Tablet 500 milliGRAM(s) Oral every 12 hours  enoxaparin Injectable 40 milliGRAM(s) SubCutaneous daily  famotidine    Tablet 40 milliGRAM(s) Oral two times a day  lactobacillus acidophilus 1 Tablet(s) Oral three times a day  metoprolol succinate  milliGRAM(s) Oral daily  nystatin Powder 1 Application(s) Topical three times a day  prasugrel 5 milliGRAM(s) Oral daily  ranolazine 500 milliGRAM(s) Oral two times a day  sodium chloride 0.9%. 1000 milliLiter(s) (75 mL/Hr) IV Continuous <Continuous>    MEDICATIONS  (PRN):  ipratropium    for Nebulization 500 MICROGram(s) Nebulizer every 6 hours PRN Bronchospasm  ondansetron Injectable 4 milliGRAM(s) IV Push every 4 hours PRN Nausea and/or Vomiting         Vitals log        ICU Vital Signs Last 24 Hrs  T(C): 36.6 (15 Yannick 2022 08:32), Max: 36.7 (14 Jan 2022 15:48)  T(F): 97.8 (15 Yannick 2022 08:32), Max: 98 (14 Jan 2022 15:48)  HR: 78 (15 Yannick 2022 08:32) (70 - 78)  BP: 173/75 (15 Yannick 2022 08:32) (158/75 - 173/75)  BP(mean): --  ABP: --  ABP(mean): --  RR: 20 (15 Yannick 2022 08:32) (16 - 20)  SpO2: 98% (15 Yannick 2022 08:32) (95% - 98%)           Input and Output:  I&O's Detail    14 Jan 2022 07:01  -  15 Yannick 2022 07:00  --------------------------------------------------------  IN:    Oral Fluid: 660 mL    sodium chloride 0.9%: 375 mL  Total IN: 1035 mL    OUT:  Total OUT: 0 mL    Total NET: 1035 mL          Lab Data                        10.9   5.27  )-----------( 285      ( 15 Yannick 2022 06:57 )             34.2     01-15    143  |  106  |  10  ----------------------------<  105<H>  4.0   |  31  |  0.75    Ca    8.5      15 Yannick 2022 06:57    TPro  6.4  /  Alb  2.8<L>  /  TBili  0.4  /  DBili  x   /  AST  22  /  ALT  25  /  AlkPhos  98  01-15            Review of Systems	      Objective     Physical Examination    heart s1s2  lung dec BS  abd soft      Pertinent Lab findings & Imaging      Bee:  NO   Adequate UO     I&O's Detail    14 Jan 2022 07:01  -  15 Jan 2022 07:00  --------------------------------------------------------  IN:    Oral Fluid: 660 mL    sodium chloride 0.9%: 375 mL  Total IN: 1035 mL    OUT:  Total OUT: 0 mL    Total NET: 1035 mL               Discussed with:     Cultures:	        Radiology

## 2022-01-15 NOTE — PROGRESS NOTE ADULT - SUBJECTIVE AND OBJECTIVE BOX
Patient is a 76y old  Female who presents with a chief complaint of diarrhea (2022 15:03)      INTERVAL HPI/OVERNIGHT EVENTS:overnight events noted    Home Medications:  aspirin 81 mg oral tablet: orally once a day (10 Yannick 2022 20:57)  famotidine 40 mg oral tablet: 1 tab(s) orally 2 times a day (10 Yannick 2022 20:57)  furosemide 20 mg oral tablet: 1 tab(s) orally once a day (10 Yannick 2022 20:57)  Metoprolol Succinate  mg oral tablet, extended release: 1 tab(s) orally once a day (at bedtime) (10 Yannick 2022 20:57)  prasugrel 5 mg oral tablet: 1 tab(s) orally once a day (10 Yannick 2022 20:57)  ranolazine 500 mg oral tablet, extended release: 1 tab(s) orally 2 times a day (10 Yannick 2022 20:57)  rosuvastatin 20 mg oral tablet: 1 tab(s) orally once a day (at bedtime) (10 Yannick 2022 20:57)      MEDICATIONS  (STANDING):  aspirin  chewable 81 milliGRAM(s) Oral daily  atorvastatin 80 milliGRAM(s) Oral at bedtime  ciprofloxacin     Tablet 500 milliGRAM(s) Oral every 12 hours  enoxaparin Injectable 40 milliGRAM(s) SubCutaneous daily  famotidine    Tablet 40 milliGRAM(s) Oral two times a day  lactobacillus acidophilus 1 Tablet(s) Oral three times a day  metoprolol succinate  milliGRAM(s) Oral daily  nystatin Powder 1 Application(s) Topical three times a day  prasugrel 5 milliGRAM(s) Oral daily  ranolazine 500 milliGRAM(s) Oral two times a day  sodium chloride 0.9%. 1000 milliLiter(s) (75 mL/Hr) IV Continuous <Continuous>    MEDICATIONS  (PRN):  ipratropium    for Nebulization 500 MICROGram(s) Nebulizer every 6 hours PRN Bronchospasm  ondansetron Injectable 4 milliGRAM(s) IV Push every 4 hours PRN Nausea and/or Vomiting      Allergies    adhesives (Rash)  No Known Drug Allergies    Intolerances        REVIEW OF SYSTEMS:  CONSTITUTIONAL: No fever, weight loss, or fatigue  EYES: No eye pain, visual disturbances, or discharge  ENMT:  No difficulty hearing, tinnitus, vertigo; No sinus or throat pain  NECK: No pain or stiffness  BREASTS: No pain, masses, or nipple discharge  RESPIRATORY: No cough, wheezing, chills or hemoptysis; No shortness of breath  CARDIOVASCULAR: No chest pain, palpitations, dizziness, or leg swelling  GASTROINTESTINAL: No abdominal or epigastric pain. No nausea, vomiting, or hematemesis; No diarrhea or constipation. No melena or hematochezia.  GENITOURINARY: No dysuria, frequency, hematuria, or incontinence  NEUROLOGICAL: No headaches, memory loss, loss of strength, numbness, or tremors  SKIN: No itching, burning, rashes, or lesions   LYMPH NODES: No enlarged glands  ENDOCRINE: No heat or cold intolerance; No hair loss  MUSCULOSKELETAL: No joint pain or swelling; No muscle, back, or extremity pain  PSYCHIATRIC: No depression, anxiety, mood swings, or difficulty sleeping  HEME/LYMPH: No easy bruising, or bleeding gums  ALLERGY AND IMMUNOLOGIC: No hives or eczema    Vital Signs Last 24 Hrs  T(C): 36.6 (15 Yannick 2022 08:32), Max: 36.7 (2022 15:48)  T(F): 97.8 (15 Yannick 2022 08:32), Max: 98 (2022 15:48)  HR: 78 (15 Yannick 2022 08:32) (70 - 78)  BP: 173/75 (15 Yannick 2022 08:32) (158/75 - 173/75)  BP(mean): --  RR: 20 (15 Yannick 2022 08:32) (16 - 20)  SpO2: 98% (15 Yannick 2022 08:32) (95% - 98%)    PHYSICAL EXAM:  GENERAL: NAD, well-groomed, well-developed  HEAD:  Atraumatic, Normocephalic  EYES: EOMI, PERRLA, conjunctiva and sclera clear  ENMT: Moist mucous membranes,   NECK: Supple, No JVD, Normal thyroid  NERVOUS SYSTEM:  Alert & Oriented X3, Good concentration; Motor Strength 5/5 B/L upper and lower extremities; DTRs 2+ intact and symmetric  CHEST/LUNG: Clear to percussion bilaterally; No rales, rhonchi, wheezing, or rubs  HEART: Regular rate and rhythm; No murmurs, rubs, or gallops  ABDOMEN: Soft, Nontender, Nondistended; Bowel sounds present  EXTREMITIES:  2+ Peripheral Pulses, No clubbing, cyanosis, or edema  LYMPH: No lymphadenopathy noted  SKIN: No rashes or lesions    LABS:                        10.9   5.27  )-----------( 285      ( 15 Yannick 2022 06:57 )             34.2     01-15    143  |  106  |  10  ----------------------------<  105<H>  4.0   |  31  |  0.75    Ca    8.5      15 Yannick 2022 06:57    TPro  6.4  /  Alb  2.8<L>  /  TBili  0.4  /  DBili  x   /  AST  22  /  ALT  25  /  AlkPhos  98  01-15      Urinalysis Basic - ( 2022 19:07 )    Color: Brown / Appearance: Slightly Turbid / S.010 / pH: x  Gluc: x / Ketone: Negative  / Bili: Negative / Urobili: Negative mg/dL   Blood: x / Protein: 30 mg/dL / Nitrite: Negative   Leuk Esterase: Moderate / RBC: >50 /HPF / WBC 3-5   Sq Epi: x / Non Sq Epi: Occasional / Bacteria: Negative      CAPILLARY BLOOD GLUCOSE            GI PCR Panel, Stool (collected 22 @ 16:49)  Source: .Stool Feces  Final Report (22 @ 21:35):    Rotavirus A    Enteropathogenic E. coli (EPEC)    DETECTED by PCR    *******Please Note:*******    GI panel PCR evaluates for:    Campylobacter, Plesiomonas shigelloides, Salmonella,    Vibrio, Yersinia enterocolitica, Enteroaggregative    Escherichia coli (EAEC), Enteropathogenic E.coli (EPEC),    Enterotoxigenic E. coli (ETEC) lt/st, Shiga-like    toxin-producing E. coli (STEC) stx1/stx2,    Shigella/ Enteroinvasive E. coli (EIEC), Cryptosporidium,    Cyclospora cayetanensis, Entamoeba histolytica,    Giardia lamblia, Adenovirus F 40/41, Astrovirus,    Norovirus GI/GII, Rotavirus A, Sapovirus        I&O's Summary    2022 07:01  -  15 Yannick 2022 07:00  --------------------------------------------------------  IN: 1035 mL / OUT: 0 mL / NET: 1035 mL        RADIOLOGY & ADDITIONAL TESTS:    Imaging Personally Reviewed:  [ x] YES  [ ] NO    Consultant(s) Notes Reviewed:  [ x] YES  [ ] NO    Care Discussed with Consultants/Other Providers [x ] YES  [ ] NO

## 2022-01-18 ENCOUNTER — NON-APPOINTMENT (OUTPATIENT)
Age: 77
End: 2022-01-18

## 2022-01-24 NOTE — ED ADULT NURSE NOTE - CAS TRG GENERAL AIRWAY, MLM
Patent Nasalis-Muscle-Based Myocutaneous Island Pedicle Flap Text: Using a #15 blade, an incision was made around the donor flap to the level of the nasalis muscle. Wide lateral undermining was then performed in both the subcutaneous plane above the nasalis muscle, and in a submuscular plane just above periosteum. This allowed the formation of a free nasalis muscle axial pedicle (based on the angular artery) which was still attached to the actual cutaneous flap, increasing its mobility and vascular viability. Hemostasis was obtained with pinpoint electrocoagulation. The flap was mobilized into position and the pivotal anchor points positioned and stabilized with buried interrupted sutures. Subcutaneous and dermal tissues were closed in a multilayered fashion with sutures. Tissue redundancies were excised, and the epidermal edges were apposed without significant tension and sutured with sutures.

## 2022-02-10 ENCOUNTER — APPOINTMENT (OUTPATIENT)
Dept: INTERNAL MEDICINE | Facility: CLINIC | Age: 77
End: 2022-02-10
Payer: MEDICARE

## 2022-02-16 ENCOUNTER — NON-APPOINTMENT (OUTPATIENT)
Age: 77
End: 2022-02-16

## 2022-02-17 ENCOUNTER — APPOINTMENT (OUTPATIENT)
Dept: INTERNAL MEDICINE | Facility: CLINIC | Age: 77
End: 2022-02-17
Payer: MEDICARE

## 2022-02-17 VITALS
SYSTOLIC BLOOD PRESSURE: 178 MMHG | OXYGEN SATURATION: 97 % | DIASTOLIC BLOOD PRESSURE: 80 MMHG | HEIGHT: 63 IN | BODY MASS INDEX: 46.42 KG/M2 | WEIGHT: 262 LBS | HEART RATE: 74 BPM

## 2022-02-17 DIAGNOSIS — Z87.898 PERSONAL HISTORY OF OTHER SPECIFIED CONDITIONS: ICD-10-CM

## 2022-02-17 DIAGNOSIS — T83.122A DISPLACEMENT OF INDWELLING URETERAL STENT, INITIAL ENCOUNTER: ICD-10-CM

## 2022-02-17 DIAGNOSIS — N13.2 HYDRONEPHROSIS WITH RENAL AND URETERAL CALCULOUS OBSTRUCTION: ICD-10-CM

## 2022-02-17 DIAGNOSIS — Z79.899 OTHER LONG TERM (CURRENT) DRUG THERAPY: ICD-10-CM

## 2022-02-17 DIAGNOSIS — Z93.6 OTHER ARTIFICIAL OPENINGS OF URINARY TRACT STATUS: ICD-10-CM

## 2022-02-17 PROCEDURE — 99214 OFFICE O/P EST MOD 30 MIN: CPT

## 2022-02-17 RX ORDER — AZELASTINE HYDROCHLORIDE 137 UG/1
0.1 SPRAY, METERED NASAL TWICE DAILY
Qty: 1 | Refills: 3 | Status: ACTIVE | COMMUNITY
Start: 2022-02-17 | End: 1900-01-01

## 2022-02-17 NOTE — HISTORY OF PRESENT ILLNESS
[de-identified] : Hospitalized in January for SBO and had recurrent diarrhea last week after having coffee last week.  Also coughs at times due to a chronic PND.  Using flonase prn \par \par Has to have dental work as she has a cavity in a front tooth and gags on the drip.\par \par Saw urology - plans to do a ?renal scan in March to see if both kidneys are working as she had hydro.  2 weeks ago had an episode of pink urine so she was concerned that she might had a UTI.  No c/o urinary urgency or frequency.\par \par We went over her CT scan done at [place] in January and education was provided about causes of small bowel obstructions.

## 2022-02-17 NOTE — ASSESSMENT
[FreeTextEntry1] : 1.  S/P episode of SBO likely secondary to adhesions.  Will check electrolytes\par 2.  Left hydro s/p nephrostomy tube with subsequent removal being followed by urology.  She also has a stone on the right which could be the cause of the one episode of gross hematuria she had.  Will check a U/A, C&S as well as renal function\par 3.  CAD/CHF - stable\par 4.  COPD - stable.  Lungs are clear and without wheezing.  Has baseline NAVARRO without change.\par 5.  Labs as per plan.  No recentl lipid profile and is on a statin so will check.\par 6.  Obesity - weight loss encouraged.  Will check a HgbA1C.\par 7.  RTO 3 months or prn.\par

## 2022-02-23 LAB
25(OH)D3 SERPL-MCNC: 19 NG/ML
ALBUMIN SERPL ELPH-MCNC: 4.1 G/DL
ALP BLD-CCNC: 116 U/L
ALT SERPL-CCNC: 11 U/L
ANION GAP SERPL CALC-SCNC: 13 MMOL/L
APPEARANCE: ABNORMAL
AST SERPL-CCNC: 16 U/L
BACTERIA UR CULT: ABNORMAL
BACTERIA: ABNORMAL
BASOPHILS # BLD AUTO: 0.01 K/UL
BASOPHILS NFR BLD AUTO: 0.1 %
BILIRUB SERPL-MCNC: 0.2 MG/DL
BILIRUBIN URINE: NEGATIVE
BLOOD URINE: ABNORMAL
BUN SERPL-MCNC: 22 MG/DL
CALCIUM SERPL-MCNC: 10.2 MG/DL
CHLORIDE SERPL-SCNC: 102 MMOL/L
CHOLEST SERPL-MCNC: 163 MG/DL
CO2 SERPL-SCNC: 26 MMOL/L
COLOR: NORMAL
CREAT SERPL-MCNC: 0.89 MG/DL
EOSINOPHIL # BLD AUTO: 0.1 K/UL
EOSINOPHIL NFR BLD AUTO: 1.3 %
ESTIMATED AVERAGE GLUCOSE: 114 MG/DL
GLUCOSE QUALITATIVE U: NEGATIVE
GLUCOSE SERPL-MCNC: 105 MG/DL
HBA1C MFR BLD HPLC: 5.6 %
HCT VFR BLD CALC: 37.4 %
HDLC SERPL-MCNC: 57 MG/DL
HGB BLD-MCNC: 11.4 G/DL
HYALINE CASTS: 0 /LPF
IMM GRANULOCYTES NFR BLD AUTO: 0.4 %
KETONES URINE: NEGATIVE
LDLC SERPL CALC-MCNC: 74 MG/DL
LEUKOCYTE ESTERASE URINE: ABNORMAL
LYMPHOCYTES # BLD AUTO: 2.55 K/UL
LYMPHOCYTES NFR BLD AUTO: 32.7 %
MAN DIFF?: NORMAL
MCHC RBC-ENTMCNC: 29.5 PG
MCHC RBC-ENTMCNC: 30.5 GM/DL
MCV RBC AUTO: 96.6 FL
MICROSCOPIC-UA: NORMAL
MONOCYTES # BLD AUTO: 0.8 K/UL
MONOCYTES NFR BLD AUTO: 10.3 %
NEUTROPHILS # BLD AUTO: 4.3 K/UL
NEUTROPHILS NFR BLD AUTO: 55.2 %
NITRITE URINE: POSITIVE
NONHDLC SERPL-MCNC: 106 MG/DL
PH URINE: 6
PLATELET # BLD AUTO: 380 K/UL
POTASSIUM SERPL-SCNC: 4.7 MMOL/L
PROT SERPL-MCNC: 7.4 G/DL
PROTEIN URINE: ABNORMAL
RBC # BLD: 3.87 M/UL
RBC # FLD: 15.8 %
RED BLOOD CELLS URINE: 71 /HPF
SODIUM SERPL-SCNC: 141 MMOL/L
SPECIFIC GRAVITY URINE: 1.02
SQUAMOUS EPITHELIAL CELLS: 1 /HPF
T4 FREE SERPL-MCNC: 1.4 NG/DL
TRIGL SERPL-MCNC: 158 MG/DL
TSH SERPL-ACNC: 1.42 UIU/ML
URINE COMMENTS: NORMAL
UROBILINOGEN URINE: NORMAL
WBC # FLD AUTO: 7.79 K/UL
WHITE BLOOD CELLS URINE: >720 /HPF

## 2022-02-23 RX ORDER — SULFAMETHOXAZOLE AND TRIMETHOPRIM 800; 160 MG/1; MG/1
800-160 TABLET ORAL
Qty: 14 | Refills: 0 | Status: COMPLETED | COMMUNITY
Start: 2022-02-23 | End: 2022-03-02

## 2022-03-07 LAB
APPEARANCE: ABNORMAL
BACTERIA UR CULT: ABNORMAL
BACTERIA: NEGATIVE
BILIRUBIN URINE: NEGATIVE
BLOOD URINE: NEGATIVE
COLOR: NORMAL
GLUCOSE QUALITATIVE U: NEGATIVE
HYALINE CASTS: 5 /LPF
KETONES URINE: NEGATIVE
LEUKOCYTE ESTERASE URINE: ABNORMAL
MICROSCOPIC-UA: NORMAL
NITRITE URINE: NEGATIVE
PH URINE: 5.5
PROTEIN URINE: NORMAL
RED BLOOD CELLS URINE: 2 /HPF
SPECIFIC GRAVITY URINE: 1.01
SQUAMOUS EPITHELIAL CELLS: 0 /HPF
UROBILINOGEN URINE: NORMAL
WHITE BLOOD CELLS URINE: 136 /HPF

## 2022-03-10 ENCOUNTER — APPOINTMENT (OUTPATIENT)
Age: 77
End: 2022-03-10
Payer: MEDICARE

## 2022-03-10 PROCEDURE — 76770 US EXAM ABDO BACK WALL COMP: CPT

## 2022-05-17 ENCOUNTER — APPOINTMENT (OUTPATIENT)
Dept: CT IMAGING | Facility: CLINIC | Age: 77
End: 2022-05-17
Payer: MEDICARE

## 2022-05-17 ENCOUNTER — OUTPATIENT (OUTPATIENT)
Dept: OUTPATIENT SERVICES | Facility: HOSPITAL | Age: 77
LOS: 1 days | End: 2022-05-17
Payer: MEDICARE

## 2022-05-17 DIAGNOSIS — Z98.890 OTHER SPECIFIED POSTPROCEDURAL STATES: Chronic | ICD-10-CM

## 2022-05-17 DIAGNOSIS — Z90.49 ACQUIRED ABSENCE OF OTHER SPECIFIED PARTS OF DIGESTIVE TRACT: Chronic | ICD-10-CM

## 2022-05-17 DIAGNOSIS — Z93.6 OTHER ARTIFICIAL OPENINGS OF URINARY TRACT STATUS: Chronic | ICD-10-CM

## 2022-05-17 DIAGNOSIS — Z90.710 ACQUIRED ABSENCE OF BOTH CERVIX AND UTERUS: Chronic | ICD-10-CM

## 2022-05-17 DIAGNOSIS — N81.4 UTEROVAGINAL PROLAPSE, UNSPECIFIED: Chronic | ICD-10-CM

## 2022-05-17 DIAGNOSIS — Z00.8 ENCOUNTER FOR OTHER GENERAL EXAMINATION: ICD-10-CM

## 2022-05-17 DIAGNOSIS — Z95.1 PRESENCE OF AORTOCORONARY BYPASS GRAFT: Chronic | ICD-10-CM

## 2022-05-17 DIAGNOSIS — Z98.49 CATARACT EXTRACTION STATUS, UNSPECIFIED EYE: Chronic | ICD-10-CM

## 2022-05-17 PROCEDURE — 74178 CT ABD&PLV WO CNTR FLWD CNTR: CPT | Mod: 26

## 2022-05-17 PROCEDURE — 74178 CT ABD&PLV WO CNTR FLWD CNTR: CPT

## 2022-05-19 ENCOUNTER — APPOINTMENT (OUTPATIENT)
Dept: INTERNAL MEDICINE | Facility: CLINIC | Age: 77
End: 2022-05-19
Payer: MEDICARE

## 2022-05-19 VITALS
BODY MASS INDEX: 47.13 KG/M2 | DIASTOLIC BLOOD PRESSURE: 80 MMHG | HEART RATE: 78 BPM | HEIGHT: 63 IN | SYSTOLIC BLOOD PRESSURE: 140 MMHG | WEIGHT: 266 LBS | OXYGEN SATURATION: 97 %

## 2022-05-19 DIAGNOSIS — Z13.31 ENCOUNTER FOR SCREENING FOR DEPRESSION: ICD-10-CM

## 2022-05-19 PROCEDURE — G0444 DEPRESSION SCREEN ANNUAL: CPT

## 2022-05-19 PROCEDURE — G0439: CPT

## 2022-05-20 NOTE — PHYSICAL EXAM
[No Acute Distress] : no acute distress [Well Nourished] : well nourished [Well Developed] : well developed [Well-Appearing] : well-appearing [Normal Sclera/Conjunctiva] : normal sclera/conjunctiva [PERRL] : pupils equal round and reactive to light [EOMI] : extraocular movements intact [Normal Outer Ear/Nose] : the outer ears and nose were normal in appearance [No JVD] : no jugular venous distention [No Lymphadenopathy] : no lymphadenopathy [Supple] : supple [Thyroid Normal, No Nodules] : the thyroid was normal and there were no nodules present [No Respiratory Distress] : no respiratory distress  [No Accessory Muscle Use] : no accessory muscle use [Clear to Auscultation] : lungs were clear to auscultation bilaterally [Normal Rate] : normal rate  [Regular Rhythm] : with a regular rhythm [Normal S1, S2] : normal S1 and S2 [No Murmur] : no murmur heard [No Carotid Bruits] : no carotid bruits [No Abdominal Bruit] : a ~M bruit was not heard ~T in the abdomen [No Varicosities] : no varicosities [Pedal Pulses Present] : the pedal pulses are present [No Edema] : there was no peripheral edema [No Palpable Aorta] : no palpable aorta [No Extremity Clubbing/Cyanosis] : no extremity clubbing/cyanosis [Normal Appearance] : normal in appearance [Soft] : abdomen soft [Non Tender] : non-tender [Non-distended] : non-distended [No Masses] : no abdominal mass palpated [No HSM] : no HSM [Normal Bowel Sounds] : normal bowel sounds [Normal Posterior Cervical Nodes] : no posterior cervical lymphadenopathy [Normal Anterior Cervical Nodes] : no anterior cervical lymphadenopathy [No CVA Tenderness] : no CVA  tenderness [No Spinal Tenderness] : no spinal tenderness [No Joint Swelling] : no joint swelling [Grossly Normal Strength/Tone] : grossly normal strength/tone [Coordination Grossly Intact] : coordination grossly intact [No Focal Deficits] : no focal deficits [Normal Gait] : normal gait [Deep Tendon Reflexes (DTR)] : deep tendon reflexes were 2+ and symmetric [Normal Affect] : the affect was normal [Normal Insight/Judgement] : insight and judgment were intact [de-identified] : Positive skin breakdown and some erythema in her intertriginous folds in the groin area.

## 2022-05-20 NOTE — HEALTH RISK ASSESSMENT
[Good] : ~his/her~  mood as  good [Former] : Former [No falls in past year] : Patient reported no falls in the past year [0] : 2) Feeling down, depressed, or hopeless: Not at all (0) [PHQ-2 Positive] : PHQ-2 Positive [YearQuit] : 2017 [de-identified] : Occasional [de-identified] : No regular exercise [de-identified] : Low-sodium [[id number]] : 0 [Patient declined mammogram] : Patient declined mammogram [Patient declined PAP Smear] : Patient declined PAP Smear [Patient declined bone density test] : Patient declined bone density test [Change in mental status noted] : No change in mental status noted [Language] : denies difficulty with language [Behavior] : denies difficulty with behavior [Learning/Retaining New Information] : denies difficulty learning/retaining new information [Handling Complex Tasks] : denies difficulty handling complex tasks [Reasoning] : denies difficulty with reasoning [Spatial Ability and Orientation] : denies difficulty with spatial ability and orientation [Alone] : lives alone [Retired] : retired [] :  [Fully functional (bathing, dressing, toileting, transferring, walking, feeding)] : Fully functional (bathing, dressing, toileting, transferring, walking, feeding) [Fully functional (using the telephone, shopping, preparing meals, housekeeping, doing laundry, using] : Fully functional and needs no help or supervision to perform IADLs (using the telephone, shopping, preparing meals, housekeeping, doing laundry, using transportation, managing medications and managing finances) [Smoke Detector] : smoke detector [Carbon Monoxide Detector] : carbon monoxide detector [Guns at Home] : no guns at home [Seat Belt] :  uses seat belt [Sunscreen] : uses sunscreen [ColonoscopyDate] : 01/12 [ColonoscopyComments] : Had some kind of polyp [With Patient/Caregiver] : , with patient/caregiver [AdvancecareDate] : 05/22 [FreeTextEntry4] : Encouraged to complete

## 2022-05-20 NOTE — ASSESSMENT
[FreeTextEntry1] : 1.  General health maintenance -discussed mammo, colonoscopy and bone density.  She declines a mammogram moving forward.  Physical exam is unremarkable.  We will get an ultrasound and will reach out to GI to see when she is due for colonoscopy and if she should have another one given her age.  We spent a good time talking about her vaccines.  She has gotten her pneumonia series but has not wanted to get any other vaccines.  We specifically talked about COVID vaccination and she is holding back getting it because her family is opposed to it.  I pointed out to her that it really should be what she wants and not what her family would say.  Risks of severe infection discussed with patient as she is at high risk for severe complications.\par 2.  Coronary artery disease stable.  Following with cardiology.\par 3.  Advanced arthritis for which she uses a cane.\par 4.  Letter written for jury duty to excuse her given her chronic multiple medical problems.\par 5.  TYLOR on CPAP with good response.\par 6.  Morbid obesity -to work on weight loss.\par 7.  Rash in intertriginous folds, more consistent with skin breakdown from chronic rubbing and maceration.  Recommend Goldbond powder.\par 8.  Follow-up in 6 months or as needed

## 2022-05-20 NOTE — HISTORY OF PRESENT ILLNESS
[de-identified] : Shawna is a 77-year-old female with a history of coronary Artery disease status post CABG, COPD, obesity, nephrolithiasis which has resulted in the need for stent x2, hypertension, asthma here for her annual wellness visit.  In general has been doing well.  Had a follow-up.  She underwent a CT scan of her abdomen 2 days ago to follow-up on her hydronephrosis and SP2's review the results with her.  I was able to find them in the PACS system and her hydronephrosis has resolved, the stent is down about and she does have bilateral nonobstructing renal stones.\par \par She needs a letter to excuse her from jury duty as she cannot sit for long periods due to her arthritis, back pain and urinary incontinence.  In addition she does have sleep apnea and tends to get tired and cannot out.\par \par She continues to use a cane to help with ambulation.  Has some skin breakdown in her skin folds.

## 2022-05-27 ENCOUNTER — OUTPATIENT (OUTPATIENT)
Dept: OUTPATIENT SERVICES | Facility: HOSPITAL | Age: 77
LOS: 1 days | End: 2022-05-27
Payer: MEDICARE

## 2022-05-27 ENCOUNTER — APPOINTMENT (OUTPATIENT)
Dept: RADIOLOGY | Facility: CLINIC | Age: 77
End: 2022-05-27
Payer: MEDICARE

## 2022-05-27 DIAGNOSIS — Z95.1 PRESENCE OF AORTOCORONARY BYPASS GRAFT: Chronic | ICD-10-CM

## 2022-05-27 DIAGNOSIS — Z90.710 ACQUIRED ABSENCE OF BOTH CERVIX AND UTERUS: Chronic | ICD-10-CM

## 2022-05-27 DIAGNOSIS — N81.4 UTEROVAGINAL PROLAPSE, UNSPECIFIED: Chronic | ICD-10-CM

## 2022-05-27 DIAGNOSIS — Z98.890 OTHER SPECIFIED POSTPROCEDURAL STATES: Chronic | ICD-10-CM

## 2022-05-27 DIAGNOSIS — Z90.49 ACQUIRED ABSENCE OF OTHER SPECIFIED PARTS OF DIGESTIVE TRACT: Chronic | ICD-10-CM

## 2022-05-27 DIAGNOSIS — Z93.6 OTHER ARTIFICIAL OPENINGS OF URINARY TRACT STATUS: Chronic | ICD-10-CM

## 2022-05-27 DIAGNOSIS — Z98.49 CATARACT EXTRACTION STATUS, UNSPECIFIED EYE: Chronic | ICD-10-CM

## 2022-05-27 DIAGNOSIS — Z13.820 ENCOUNTER FOR SCREENING FOR OSTEOPOROSIS: ICD-10-CM

## 2022-05-27 PROCEDURE — 77080 DXA BONE DENSITY AXIAL: CPT | Mod: 26

## 2022-05-27 PROCEDURE — 77080 DXA BONE DENSITY AXIAL: CPT

## 2022-07-21 RX ORDER — FAMOTIDINE 40 MG/1
40 TABLET, FILM COATED ORAL TWICE DAILY
Qty: 180 | Refills: 3 | Status: ACTIVE | COMMUNITY
Start: 2020-12-15 | End: 1900-01-01

## 2022-09-01 RX ORDER — CIPROFLOXACIN HYDROCHLORIDE 500 MG/1
500 TABLET, FILM COATED ORAL
Qty: 6 | Refills: 0 | Status: DISCONTINUED | COMMUNITY
Start: 2022-09-01 | End: 2022-09-01

## 2022-09-11 LAB
APPEARANCE: ABNORMAL
BACTERIA UR CULT: ABNORMAL
BACTERIA: ABNORMAL
BILIRUBIN URINE: NEGATIVE
BLOOD URINE: ABNORMAL
COLOR: NORMAL
GLUCOSE QUALITATIVE U: NEGATIVE
KETONES URINE: NEGATIVE
LEUKOCYTE ESTERASE URINE: ABNORMAL
MICROSCOPIC-UA: NORMAL
NITRITE URINE: NEGATIVE
PH URINE: 6
PROTEIN URINE: NEGATIVE
RED BLOOD CELLS URINE: 1 /HPF
SPECIFIC GRAVITY URINE: 1.02
SQUAMOUS EPITHELIAL CELLS: 2 /HPF
UROBILINOGEN URINE: NORMAL
WHITE BLOOD CELLS URINE: >720 /HPF

## 2022-10-13 PROBLEM — Z13.31 SCREENING FOR DEPRESSION: Status: RESOLVED | Noted: 2022-05-20 | Resolved: 2022-05-22

## 2022-11-16 ENCOUNTER — APPOINTMENT (OUTPATIENT)
Dept: INTERNAL MEDICINE | Facility: CLINIC | Age: 77
End: 2022-11-16
Payer: MEDICARE

## 2022-11-16 VITALS
DIASTOLIC BLOOD PRESSURE: 76 MMHG | BODY MASS INDEX: 47.31 KG/M2 | SYSTOLIC BLOOD PRESSURE: 130 MMHG | HEIGHT: 63 IN | WEIGHT: 267 LBS | HEART RATE: 83 BPM | OXYGEN SATURATION: 97 %

## 2022-11-16 PROCEDURE — 99213 OFFICE O/P EST LOW 20 MIN: CPT

## 2022-11-16 RX ORDER — HYDROCORTISONE VALERATE 2 MG/G
0.2 CREAM TOPICAL
Qty: 60 | Refills: 0 | Status: DISCONTINUED | COMMUNITY
Start: 2018-02-08 | End: 2022-11-16

## 2022-11-16 RX ORDER — CEFDINIR 300 MG/1
300 CAPSULE ORAL
Qty: 14 | Refills: 0 | Status: DISCONTINUED | COMMUNITY
Start: 2022-03-07 | End: 2022-11-16

## 2022-11-16 RX ORDER — KETOCONAZOLE 20 MG/G
2 CREAM TOPICAL
Qty: 60 | Refills: 0 | Status: DISCONTINUED | COMMUNITY
Start: 2018-02-08 | End: 2022-11-16

## 2023-01-29 NOTE — HISTORY OF PRESENT ILLNESS
[de-identified] : 77-year-old female with a history of coronary artery disease, mild emphysema, obesity who presents to the office today for follow-up of the same.  Also has a history of hydronephrosis secondary to bilateral renal stones for which she had a stent placed.  She is doing well and has no new complaints.  She needs a flu shot today.

## 2023-01-29 NOTE — ASSESSMENT
[FreeTextEntry1] : 1.  CAD -stable.  Follows up with cardiology at [facility].  No change in management.\par 2.  Bilateral nephrocalcinosis which required stenting.  Is doing well and continues to follow with urology.\par 3.  Obesity -to continue attempts at weight loss and to work on diet.  Limited in her ability to do exercise.\par 4.  Hypertension -BP is controlled at this time.\par 5.  Flu shot today.\par 6.  Follow-up in May for her annual wellness visit

## 2023-02-04 NOTE — ED ADULT NURSE NOTE - CAS EDN DISCHARGE ASSESSMENT
You have a clavicle fracture. You have been placed in a sling. Wear this at all times. You may remove it to shower but try to keep your arm down at your side. You can apply ice to your clavicle in 20-minute intervals. Take over-the-counter Motrin or Aleve for pain. You have been referred to follow-up with an orthopedic surgeon. Please call their office Monday for an appointment this week.
Alert and oriented to person, place and time

## 2023-03-02 NOTE — ED ADULT NURSE NOTE - MUSCULOSKELETAL ASSESSMENT
Please let patient know had adenomatous polyp, which is a kind of polyp that would require follow up.    I would recommend repeating colonoscopy in 3 years or so if doing well, sooner if symptoms of concern develop.    Please update HM for me.    Thanks   KJ      
WDL

## 2023-03-21 ENCOUNTER — APPOINTMENT (OUTPATIENT)
Dept: INTERNAL MEDICINE | Facility: CLINIC | Age: 78
End: 2023-03-21
Payer: MEDICARE

## 2023-03-21 VITALS
OXYGEN SATURATION: 95 % | HEART RATE: 87 BPM | BODY MASS INDEX: 46.6 KG/M2 | SYSTOLIC BLOOD PRESSURE: 150 MMHG | DIASTOLIC BLOOD PRESSURE: 70 MMHG | WEIGHT: 263 LBS | HEIGHT: 63 IN

## 2023-03-21 PROCEDURE — G0439: CPT

## 2023-03-22 ENCOUNTER — APPOINTMENT (OUTPATIENT)
Dept: ORTHOPEDIC SURGERY | Facility: CLINIC | Age: 78
End: 2023-03-22
Payer: MEDICARE

## 2023-03-22 DIAGNOSIS — M25.562 PAIN IN LEFT KNEE: ICD-10-CM

## 2023-03-22 DIAGNOSIS — M17.12 UNILATERAL PRIMARY OSTEOARTHRITIS, LEFT KNEE: ICD-10-CM

## 2023-03-22 PROCEDURE — 73562 X-RAY EXAM OF KNEE 3: CPT | Mod: LT

## 2023-03-22 PROCEDURE — 99203 OFFICE O/P NEW LOW 30 MIN: CPT

## 2023-03-22 NOTE — PHYSICAL EXAM
[de-identified] : Constitutional: Well-nourished, well-developed, No acute distress\par Respiratory:  Good respiratory effort, no SOB\par Lymphatic: No regional lymphadenopathy, no lymphedema\par Psychiatric: Pleasant and normal affect, alert and oriented x3\par Musculoskeletal: normal except where as noted in regional exam\par \par Left knee:\par APPEARANCE: + enlargement of the distal femur and proximal tibia, varus deformity, 1+ swelling, mild swelling and ecchymosis of the anterior knee overlying the tibial tubercle and proximal lower leg\par POSITIVE TENDERNESS:  Distal femur, proximal tibia, medial jt line/retinaculum, and lateral jt line/retinaculum\par NONTENDER: patellar & quadriceps tendons, MCL/LCL, ITB at the lateral femoral condyle & Gerdy's tubercle, pes bursa. \par ROM: full extension, limited flexion to 110° due to stiffness and pain. \par RESISTIVE TESTING: painless resisted knee flex/ext, although + crepitus felt in anterior knee. \par SPECIAL TESTS: stable v/v stress. painless grind. neg ant/post drawer. + Gabby's for pain in medial and lateral joint line. \par  [de-identified] : The following radiographs were ordered and read by me during this patient's visit. I reviewed each radiograph in detail with the patient and discussed the findings as highlighted below. \par \par 3 views of the left knee with one AP view of the right knee were obtained today that show degenerative changes and evidence of moderate to early severe tricompartmental osteoarthritis with slight medial joint line narrowing in the left knee with severe osteoarthritis in the right knee with significant medial joint line narrowing.  No fracture, or dislocation seen at this time. There is no malalignment. No other obvious osseous abnormality. Otherwise unremarkable.

## 2023-03-22 NOTE — CONSULT LETTER
[Dear  ___] : Dear  [unfilled], [Consult Letter:] : I had the pleasure of evaluating your patient, [unfilled]. [Please see my note below.] : Please see my note below. [Consult Closing:] : Thank you very much for allowing me to participate in the care of this patient.  If you have any questions, please do not hesitate to contact me. [Sincerely,] : Sincerely, [FreeTextEntry2] : RENNY GAN\par SELF,REFERRED  [FreeTextEntry3] : Garret Ortiz DO, ATC\par Primary Care Sports Medicine\par [facility]

## 2023-03-22 NOTE — HISTORY OF PRESENT ILLNESS
[de-identified] : Patient is here for left knee pain  that began after a fall on 3/15/23. She was seen by her PCP who referred her here. She has used topical treatments. She cannot take NSAIDs due to cardiac issues. She presents with a cane that she uses prior to this injury. \par \par The patient's past medical history, past surgical history, medications and allergies were reviewed by me today and documented accordingly. In addition, the patient's family and social history, which were noncontributory to this visit, were reviewed also. Intake form was reviewed. The patient has no family history of arthritis.

## 2023-04-16 LAB
25(OH)D3 SERPL-MCNC: 14.5 NG/ML
ALBUMIN SERPL ELPH-MCNC: 4 G/DL
ALP BLD-CCNC: 88 U/L
ALT SERPL-CCNC: 9 U/L
ANION GAP SERPL CALC-SCNC: 13 MMOL/L
AST SERPL-CCNC: 15 U/L
BASOPHILS # BLD AUTO: 0.02 K/UL
BASOPHILS NFR BLD AUTO: 0.3 %
BILIRUB SERPL-MCNC: 0.3 MG/DL
BUN SERPL-MCNC: 15 MG/DL
CALCIUM SERPL-MCNC: 9.6 MG/DL
CHLORIDE SERPL-SCNC: 103 MMOL/L
CHOLEST SERPL-MCNC: 142 MG/DL
CO2 SERPL-SCNC: 25 MMOL/L
CREAT SERPL-MCNC: 0.87 MG/DL
EGFR: 68 ML/MIN/1.73M2
EOSINOPHIL # BLD AUTO: 0.07 K/UL
EOSINOPHIL NFR BLD AUTO: 1 %
ESTIMATED AVERAGE GLUCOSE: 120 MG/DL
GLUCOSE SERPL-MCNC: 102 MG/DL
HBA1C MFR BLD HPLC: 5.8 %
HCT VFR BLD CALC: 36.3 %
HDLC SERPL-MCNC: 55 MG/DL
HGB BLD-MCNC: 11.3 G/DL
IMM GRANULOCYTES NFR BLD AUTO: 0.4 %
LDLC SERPL CALC-MCNC: 62 MG/DL
LYMPHOCYTES # BLD AUTO: 1.88 K/UL
LYMPHOCYTES NFR BLD AUTO: 26.3 %
MAN DIFF?: NORMAL
MCHC RBC-ENTMCNC: 30.8 PG
MCHC RBC-ENTMCNC: 31.1 GM/DL
MCV RBC AUTO: 98.9 FL
MONOCYTES # BLD AUTO: 0.64 K/UL
MONOCYTES NFR BLD AUTO: 8.9 %
NEUTROPHILS # BLD AUTO: 4.52 K/UL
NEUTROPHILS NFR BLD AUTO: 63.1 %
NONHDLC SERPL-MCNC: 87 MG/DL
PLATELET # BLD AUTO: 317 K/UL
POTASSIUM SERPL-SCNC: 4.7 MMOL/L
PROT SERPL-MCNC: 7.2 G/DL
RBC # BLD: 3.67 M/UL
RBC # FLD: 15.2 %
SODIUM SERPL-SCNC: 141 MMOL/L
T4 FREE SERPL-MCNC: 1.3 NG/DL
TRIGL SERPL-MCNC: 124 MG/DL
TSH SERPL-ACNC: 1.03 UIU/ML
WBC # FLD AUTO: 7.16 K/UL

## 2023-04-17 NOTE — ASSESSMENT
[FreeTextEntry1] : 1.  General health maintenance -discussed mammo, colonoscopy and bone density.  She declines a mammogram moving forward.  Physical exam is unremarkable.  We will get an ultrasound and will reach out to GI to see when she is due for colonoscopy and if she should have another one given her age.  Had her pneumonia shots but declines other vaccines.\par 2.  Coronary artery disease stable.  Following with cardiology.\par 3.  Advanced arthritis for which she uses a cane.\par 4.  Left knee contusion post fall - ortho evaluation.\par 5.  TYLOR on CPAP with good response.\par 6.  Morbid obesity -to work on weight loss.\par 7.  F/U 6 months or prn.

## 2023-04-17 NOTE — HISTORY OF PRESENT ILLNESS
[de-identified] : Shawna is a 77-year-old female with a history of coronary Artery disease status post CABG, COPD, obesity, nephrolithiasis which has resulted in the need for stent x2, hypertension, asthma here for her annual wellness visit.\par \par She continues to use a cane to help with ambulation.  \par \luisana Fell 7 days ago and hurt her left knee.  Fell lateral but has medial pain and has bruising on both sides of the knee.\par \par Seeing cardiology in 2 weeks.  No c/o CP or SOB.  \par \par Trying to clean out her house.\par \par Does not have a health care proxy.\par

## 2023-04-17 NOTE — PHYSICAL EXAM
[No Acute Distress] : no acute distress [Well Nourished] : well nourished [Well Developed] : well developed [Well-Appearing] : well-appearing [Normal Sclera/Conjunctiva] : normal sclera/conjunctiva [PERRL] : pupils equal round and reactive to light [EOMI] : extraocular movements intact [Normal Outer Ear/Nose] : the outer ears and nose were normal in appearance [No JVD] : no jugular venous distention [No Lymphadenopathy] : no lymphadenopathy [Supple] : supple [Thyroid Normal, No Nodules] : the thyroid was normal and there were no nodules present [No Respiratory Distress] : no respiratory distress  [No Accessory Muscle Use] : no accessory muscle use [Clear to Auscultation] : lungs were clear to auscultation bilaterally [Normal Rate] : normal rate  [Regular Rhythm] : with a regular rhythm [Normal S1, S2] : normal S1 and S2 [No Murmur] : no murmur heard [No Carotid Bruits] : no carotid bruits [No Abdominal Bruit] : a ~M bruit was not heard ~T in the abdomen [No Varicosities] : no varicosities [Pedal Pulses Present] : the pedal pulses are present [No Edema] : there was no peripheral edema [No Palpable Aorta] : no palpable aorta [No Extremity Clubbing/Cyanosis] : no extremity clubbing/cyanosis [Normal Appearance] : normal in appearance [Soft] : abdomen soft [Non Tender] : non-tender [Non-distended] : non-distended [No Masses] : no abdominal mass palpated [No HSM] : no HSM [Normal Bowel Sounds] : normal bowel sounds [No CVA Tenderness] : no CVA  tenderness [No Spinal Tenderness] : no spinal tenderness [Coordination Grossly Intact] : coordination grossly intact [No Focal Deficits] : no focal deficits [Normal Gait] : normal gait [Deep Tendon Reflexes (DTR)] : deep tendon reflexes were 2+ and symmetric [Normal Affect] : the affect was normal [Normal Insight/Judgement] : insight and judgment were intact [de-identified] : left knee contusion with cruising and pre-patella effusion.  Marked tenderness to palpation. [de-identified] : Positive skin breakdown and some erythema in her intertriginous folds in the groin area.

## 2023-04-17 NOTE — HEALTH RISK ASSESSMENT
[Good] : ~his/her~  mood as  good [0] : 2) Feeling down, depressed, or hopeless: Not at all (0) [Patient declined mammogram] : Patient declined mammogram [Patient declined PAP Smear] : Patient declined PAP Smear [Patient declined bone density test] : Patient declined bone density test [Alone] : lives alone [Retired] : retired [] :  [Fully functional (bathing, dressing, toileting, transferring, walking, feeding)] : Fully functional (bathing, dressing, toileting, transferring, walking, feeding) [Fully functional (using the telephone, shopping, preparing meals, housekeeping, doing laundry, using] : Fully functional and needs no help or supervision to perform IADLs (using the telephone, shopping, preparing meals, housekeeping, doing laundry, using transportation, managing medications and managing finances) [Smoke Detector] : smoke detector [Carbon Monoxide Detector] : carbon monoxide detector [Seat Belt] :  uses seat belt [Sunscreen] : uses sunscreen [With Patient/Caregiver] : , with patient/caregiver [Any fall with injury in past year] : Patient reported fall with injury in the past year [PHQ-2 Negative - No further assessment needed] : PHQ-2 Negative - No further assessment needed [de-identified] : Occasional [de-identified] : No regular exercise [de-identified] : Low-sodium [TMS2Jrobs] : 0 [Change in mental status noted] : No change in mental status noted [Language] : denies difficulty with language [Behavior] : denies difficulty with behavior [Learning/Retaining New Information] : denies difficulty learning/retaining new information [Handling Complex Tasks] : denies difficulty handling complex tasks [Reasoning] : denies difficulty with reasoning [Spatial Ability and Orientation] : denies difficulty with spatial ability and orientation [Guns at Home] : no guns at home [PapSmearComments] : s/p ROBERTO for cervical cancer [ColonoscopyDate] : 01/12 [ColonoscopyComments] : Had some kind of polyp [HIVDate] : 06/18 [HepatitisCDate] : 06/18 [de-identified] : uses a cane prn [AdvancecareDate] : 03/23 [FreeTextEntry4] : Encouraged to complete [Former] : Former

## 2023-07-20 ENCOUNTER — LABORATORY RESULT (OUTPATIENT)
Age: 78
End: 2023-07-20

## 2023-07-20 ENCOUNTER — APPOINTMENT (OUTPATIENT)
Dept: INTERNAL MEDICINE | Facility: CLINIC | Age: 78
End: 2023-07-20
Payer: COMMERCIAL

## 2023-07-20 ENCOUNTER — APPOINTMENT (OUTPATIENT)
Dept: INTERNAL MEDICINE | Facility: CLINIC | Age: 78
End: 2023-07-20

## 2023-07-20 VITALS
TEMPERATURE: 98 F | DIASTOLIC BLOOD PRESSURE: 78 MMHG | WEIGHT: 264 LBS | SYSTOLIC BLOOD PRESSURE: 143 MMHG | HEIGHT: 63 IN | BODY MASS INDEX: 46.78 KG/M2 | HEART RATE: 77 BPM | OXYGEN SATURATION: 95 %

## 2023-07-20 DIAGNOSIS — R06.02 SHORTNESS OF BREATH: ICD-10-CM

## 2023-07-20 DIAGNOSIS — Z87.898 PERSONAL HISTORY OF OTHER SPECIFIED CONDITIONS: ICD-10-CM

## 2023-07-20 DIAGNOSIS — Z13.820 ENCOUNTER FOR SCREENING FOR OSTEOPOROSIS: ICD-10-CM

## 2023-07-20 DIAGNOSIS — S80.12XA CONTUSION OF LEFT KNEE, INITIAL ENCOUNTER: ICD-10-CM

## 2023-07-20 DIAGNOSIS — M79.89 OTHER SPECIFIED SOFT TISSUE DISORDERS: ICD-10-CM

## 2023-07-20 DIAGNOSIS — Z23 ENCOUNTER FOR IMMUNIZATION: ICD-10-CM

## 2023-07-20 DIAGNOSIS — Z01.818 ENCOUNTER FOR OTHER PREPROCEDURAL EXAMINATION: ICD-10-CM

## 2023-07-20 DIAGNOSIS — R21 RASH AND OTHER NONSPECIFIC SKIN ERUPTION: ICD-10-CM

## 2023-07-20 DIAGNOSIS — J45.909 UNSPECIFIED ASTHMA, UNCOMPLICATED: ICD-10-CM

## 2023-07-20 DIAGNOSIS — Z00.00 ENCOUNTER FOR GENERAL ADULT MEDICAL EXAMINATION W/OUT ABNORMAL FINDINGS: ICD-10-CM

## 2023-07-20 DIAGNOSIS — Z87.440 PERSONAL HISTORY OF URINARY (TRACT) INFECTIONS: ICD-10-CM

## 2023-07-20 DIAGNOSIS — M85.80 OTHER SPECIFIED DISORDERS OF BONE DENSITY AND STRUCTURE, UNSPECIFIED SITE: ICD-10-CM

## 2023-07-20 DIAGNOSIS — E55.9 VITAMIN D DEFICIENCY, UNSPECIFIED: ICD-10-CM

## 2023-07-20 DIAGNOSIS — R06.83 SNORING: ICD-10-CM

## 2023-07-20 DIAGNOSIS — R80.1 PERSISTENT PROTEINURIA, UNSPECIFIED: ICD-10-CM

## 2023-07-20 DIAGNOSIS — S80.02XA CONTUSION OF LEFT KNEE, INITIAL ENCOUNTER: ICD-10-CM

## 2023-07-20 DIAGNOSIS — Z53.20 PROCEDURE AND TREATMENT NOT CARRIED OUT BECAUSE OF PATIENT'S DECISION FOR UNSPECIFIED REASONS: ICD-10-CM

## 2023-07-20 PROCEDURE — G0447 BEHAVIOR COUNSEL OBESITY 15M: CPT | Mod: 59

## 2023-07-20 PROCEDURE — 99215 OFFICE O/P EST HI 40 MIN: CPT | Mod: 25

## 2023-07-20 PROCEDURE — 90677 PCV20 VACCINE IM: CPT

## 2023-07-20 PROCEDURE — G0444 DEPRESSION SCREEN ANNUAL: CPT | Mod: 59

## 2023-07-20 PROCEDURE — G0009: CPT

## 2023-07-20 PROCEDURE — 36415 COLL VENOUS BLD VENIPUNCTURE: CPT

## 2023-07-20 PROCEDURE — G0296 VISIT TO DETERM LDCT ELIG: CPT

## 2023-07-20 RX ORDER — FUROSEMIDE 20 MG/1
20 TABLET ORAL DAILY
Qty: 90 | Refills: 3 | Status: DISCONTINUED | COMMUNITY
Start: 2021-06-15 | End: 2023-07-20

## 2023-07-20 RX ORDER — ASPIRIN ENTERIC COATED TABLETS 81 MG 81 MG/1
81 TABLET, DELAYED RELEASE ORAL
Refills: 3 | Status: ACTIVE | COMMUNITY

## 2023-07-20 RX ORDER — ROSUVASTATIN CALCIUM 20 MG/1
20 TABLET, FILM COATED ORAL
Refills: 0 | Status: ACTIVE | COMMUNITY

## 2023-07-20 RX ORDER — RANOLAZINE 500 MG/1
500 TABLET, EXTENDED RELEASE ORAL
Refills: 0 | Status: ACTIVE | COMMUNITY

## 2023-07-22 ENCOUNTER — TRANSCRIPTION ENCOUNTER (OUTPATIENT)
Age: 78
End: 2023-07-22

## 2023-07-22 LAB
25(OH)D3 SERPL-MCNC: 21.9 NG/ML
ALBUMIN SERPL ELPH-MCNC: 4.3 G/DL
ALP BLD-CCNC: 90 U/L
ALT SERPL-CCNC: 11 U/L
ANION GAP SERPL CALC-SCNC: 12 MMOL/L
APPEARANCE: ABNORMAL
AST SERPL-CCNC: 17 U/L
BILIRUB SERPL-MCNC: 0.4 MG/DL
BILIRUBIN URINE: NEGATIVE
BLOOD URINE: ABNORMAL
BUN SERPL-MCNC: 19 MG/DL
CALCIUM SERPL-MCNC: 9.6 MG/DL
CHLORIDE SERPL-SCNC: 102 MMOL/L
CHOLEST SERPL-MCNC: 137 MG/DL
CO2 SERPL-SCNC: 26 MMOL/L
COLOR: YELLOW
CREAT SERPL-MCNC: 0.94 MG/DL
CREAT SPEC-SCNC: 99 MG/DL
EGFR: 62 ML/MIN/1.73M2
ESTIMATED AVERAGE GLUCOSE: 120 MG/DL
GLUCOSE QUALITATIVE U: NEGATIVE MG/DL
GLUCOSE SERPL-MCNC: 100 MG/DL
HBA1C MFR BLD HPLC: 5.8 %
HDLC SERPL-MCNC: 60 MG/DL
KETONES URINE: NEGATIVE MG/DL
LDLC SERPL CALC-MCNC: 55 MG/DL
LEUKOCYTE ESTERASE URINE: ABNORMAL
MICROALBUMIN 24H UR DL<=1MG/L-MCNC: 4.1 MG/DL
MICROALBUMIN/CREAT 24H UR-RTO: 42 MG/G
NITRITE URINE: POSITIVE
NONHDLC SERPL-MCNC: 77 MG/DL
PH URINE: 6
POTASSIUM SERPL-SCNC: 5.3 MMOL/L
PROT SERPL-MCNC: 7.7 G/DL
PROTEIN URINE: NORMAL MG/DL
SODIUM SERPL-SCNC: 140 MMOL/L
SPECIFIC GRAVITY URINE: 1.02
TRIGL SERPL-MCNC: 121 MG/DL
UROBILINOGEN URINE: 0.2 MG/DL

## 2023-07-31 ENCOUNTER — NON-APPOINTMENT (OUTPATIENT)
Age: 78
End: 2023-07-31

## 2023-07-31 VITALS — WEIGHT: 264 LBS | BODY MASS INDEX: 46.78 KG/M2 | HEIGHT: 63 IN

## 2023-07-31 NOTE — HISTORY OF PRESENT ILLNESS
[Former] : Former [TextBox_13] : Referred by Dr. Nayeli Arshad.  Ms. BRICE is a 78 year old female with a history of nicotine dependence.  She was called to review eligibility for Low-Dose CT lung cancer screening.  Reviewed and confirmed that the patient meets screening eligibility criteria:  78 years old   Smoking Status: Former smoker    Number of pack(s) per day: 0.5 Number of years smoked:  50 Number of pack years smokin  Number of years since quitting smokin Quit year:   No symptoms of lung cancer, including new cough, change in cough, hemoptysis, and unintentional weight loss.  No personal history of lung cancer.  No lung cancer in a first degree relative.  No history of lung disease or occupational exposures. [YearQuit] : 2017 [PacksperDay] : 0.5 [N_Years] : 50 [PacksperYear] : 25

## 2023-08-02 ENCOUNTER — APPOINTMENT (OUTPATIENT)
Dept: CT IMAGING | Facility: CLINIC | Age: 78
End: 2023-08-02
Payer: MEDICARE

## 2023-08-02 ENCOUNTER — OUTPATIENT (OUTPATIENT)
Dept: OUTPATIENT SERVICES | Facility: HOSPITAL | Age: 78
LOS: 1 days | End: 2023-08-02
Payer: COMMERCIAL

## 2023-08-02 DIAGNOSIS — Z98.890 OTHER SPECIFIED POSTPROCEDURAL STATES: Chronic | ICD-10-CM

## 2023-08-02 DIAGNOSIS — Z95.1 PRESENCE OF AORTOCORONARY BYPASS GRAFT: Chronic | ICD-10-CM

## 2023-08-02 DIAGNOSIS — Z90.49 ACQUIRED ABSENCE OF OTHER SPECIFIED PARTS OF DIGESTIVE TRACT: Chronic | ICD-10-CM

## 2023-08-02 DIAGNOSIS — Z93.6 OTHER ARTIFICIAL OPENINGS OF URINARY TRACT STATUS: Chronic | ICD-10-CM

## 2023-08-02 DIAGNOSIS — Z87.891 PERSONAL HISTORY OF NICOTINE DEPENDENCE: ICD-10-CM

## 2023-08-02 DIAGNOSIS — Z90.710 ACQUIRED ABSENCE OF BOTH CERVIX AND UTERUS: Chronic | ICD-10-CM

## 2023-08-02 DIAGNOSIS — Z98.49 CATARACT EXTRACTION STATUS, UNSPECIFIED EYE: Chronic | ICD-10-CM

## 2023-08-02 DIAGNOSIS — N81.4 UTEROVAGINAL PROLAPSE, UNSPECIFIED: Chronic | ICD-10-CM

## 2023-08-02 PROCEDURE — 71271 CT THORAX LUNG CANCER SCR C-: CPT | Mod: 26

## 2023-08-02 PROCEDURE — 71271 CT THORAX LUNG CANCER SCR C-: CPT

## 2023-10-06 NOTE — PATIENT PROFILE ADULT - NSPROPASSIVESMOKEEXPOSURE_GEN_A_NUR
Patient Clipped and Prepped: right radial. Prepped with ChloraPrep, a minimum of 3 minute dry time, longer if needed, no pooling noted, patient draped in sterile fashion. Unknown

## 2023-10-31 ENCOUNTER — APPOINTMENT (OUTPATIENT)
Dept: INTERNAL MEDICINE | Facility: CLINIC | Age: 78
End: 2023-10-31
Payer: MEDICARE

## 2023-10-31 VITALS
SYSTOLIC BLOOD PRESSURE: 129 MMHG | DIASTOLIC BLOOD PRESSURE: 62 MMHG | HEART RATE: 73 BPM | OXYGEN SATURATION: 96 % | TEMPERATURE: 98.3 F | RESPIRATION RATE: 16 BRPM

## 2023-10-31 DIAGNOSIS — Z23 ENCOUNTER FOR IMMUNIZATION: ICD-10-CM

## 2023-10-31 DIAGNOSIS — R09.82 POSTNASAL DRIP: ICD-10-CM

## 2023-10-31 DIAGNOSIS — J06.9 ACUTE UPPER RESPIRATORY INFECTION, UNSPECIFIED: ICD-10-CM

## 2023-10-31 PROCEDURE — 99214 OFFICE O/P EST MOD 30 MIN: CPT | Mod: 95

## 2023-10-31 RX ORDER — BROMPHENIRAMINE MALEATE, PSEUDOEPHEDRINE HYDROCHLORIDE, 2; 30; 10 MG/5ML; MG/5ML; MG/5ML
30-2-10 SYRUP ORAL EVERY 8 HOURS
Qty: 1 | Refills: 0 | Status: ACTIVE | COMMUNITY
Start: 2023-10-31 | End: 1900-01-01

## 2023-11-16 NOTE — ED ADULT NURSE NOTE - CHPI ED NUR CONTEXT2
MRI confirms acute osteomyelitis of sacrum.  Sputum culture showing Pseudomonas and serratia. Antibiotics as prescribed drinking liquids/food ingestion

## 2023-12-04 ENCOUNTER — APPOINTMENT (OUTPATIENT)
Dept: INTERNAL MEDICINE | Facility: CLINIC | Age: 78
End: 2023-12-04
Payer: MEDICARE

## 2023-12-04 VITALS
TEMPERATURE: 98 F | DIASTOLIC BLOOD PRESSURE: 71 MMHG | BODY MASS INDEX: 45.89 KG/M2 | SYSTOLIC BLOOD PRESSURE: 113 MMHG | OXYGEN SATURATION: 96 % | HEART RATE: 86 BPM | WEIGHT: 259 LBS | HEIGHT: 63 IN

## 2023-12-04 DIAGNOSIS — R73.03 PREDIABETES.: ICD-10-CM

## 2023-12-04 DIAGNOSIS — J34.89 OTHER SPECIFIED DISORDERS OF NOSE AND NASAL SINUSES: ICD-10-CM

## 2023-12-04 DIAGNOSIS — Z78.9 OTHER SPECIFIED HEALTH STATUS: ICD-10-CM

## 2023-12-04 DIAGNOSIS — E66.01 MORBID (SEVERE) OBESITY DUE TO EXCESS CALORIES: ICD-10-CM

## 2023-12-04 DIAGNOSIS — J44.1 CHRONIC OBSTRUCTIVE PULMONARY DISEASE WITH (ACUTE) EXACERBATION: ICD-10-CM

## 2023-12-04 DIAGNOSIS — E78.5 HYPERLIPIDEMIA, UNSPECIFIED: ICD-10-CM

## 2023-12-04 DIAGNOSIS — Z95.5 PRESENCE OF CORONARY ANGIOPLASTY IMPLANT AND GRAFT: ICD-10-CM

## 2023-12-04 DIAGNOSIS — I10 ESSENTIAL (PRIMARY) HYPERTENSION: ICD-10-CM

## 2023-12-04 DIAGNOSIS — I25.119 ATHEROSCLEROTIC HEART DISEASE OF NATIVE CORONARY ARTERY WITH UNSPECIFIED ANGINA PECTORIS: ICD-10-CM

## 2023-12-04 DIAGNOSIS — Z95.1 PRESENCE OF AORTOCORONARY BYPASS GRAFT: ICD-10-CM

## 2023-12-04 DIAGNOSIS — I25.10 ATHEROSCLEROTIC HEART DISEASE OF NATIVE CORONARY ARTERY W/OUT ANGINA PECTORIS: ICD-10-CM

## 2023-12-04 DIAGNOSIS — Z79.01 LONG TERM (CURRENT) USE OF ANTICOAGULANTS: ICD-10-CM

## 2023-12-04 DIAGNOSIS — R10.13 EPIGASTRIC PAIN: ICD-10-CM

## 2023-12-04 DIAGNOSIS — J43.9 EMPHYSEMA, UNSPECIFIED: ICD-10-CM

## 2023-12-04 DIAGNOSIS — M48.061 SPINAL STENOSIS, LUMBAR REGION WITHOUT NEUROGENIC CLAUDICATION: ICD-10-CM

## 2023-12-04 DIAGNOSIS — Z87.891 PERSONAL HISTORY OF NICOTINE DEPENDENCE: ICD-10-CM

## 2023-12-04 DIAGNOSIS — N20.0 CALCULUS OF KIDNEY: ICD-10-CM

## 2023-12-04 DIAGNOSIS — R05.8 OTHER SPECIFIED COUGH: ICD-10-CM

## 2023-12-04 DIAGNOSIS — Z76.89 PERSONS ENCOUNTERING HEALTH SERVICES IN OTHER SPECIFIED CIRCUMSTANCES: ICD-10-CM

## 2023-12-04 DIAGNOSIS — G47.33 OBSTRUCTIVE SLEEP APNEA (ADULT) (PEDIATRIC): ICD-10-CM

## 2023-12-04 PROCEDURE — 99215 OFFICE O/P EST HI 40 MIN: CPT | Mod: 25

## 2023-12-04 PROCEDURE — 36415 COLL VENOUS BLD VENIPUNCTURE: CPT

## 2023-12-04 RX ORDER — PREDNISONE 5 MG/1
5 TABLET ORAL
Qty: 42 | Refills: 0 | Status: ACTIVE | COMMUNITY
Start: 2023-12-04 | End: 1900-01-01

## 2023-12-04 RX ORDER — METHYLPREDNISOLONE 4 MG/1
4 TABLET ORAL
Qty: 21 | Refills: 0 | Status: DISCONTINUED | COMMUNITY
Start: 2023-10-31 | End: 2023-12-04

## 2023-12-04 RX ORDER — CEFUROXIME AXETIL 500 MG/1
500 TABLET ORAL
Qty: 20 | Refills: 0 | Status: DISCONTINUED | COMMUNITY
Start: 2023-10-31 | End: 2023-12-04

## 2023-12-04 RX ORDER — ALBUTEROL SULFATE 90 UG/1
108 (90 BASE) INHALANT RESPIRATORY (INHALATION)
Qty: 1 | Refills: 1 | Status: ACTIVE | COMMUNITY
Start: 2023-12-04 | End: 1900-01-01

## 2023-12-07 PROBLEM — J44.1 COPD EXACERBATION: Status: ACTIVE | Noted: 2023-12-04

## 2023-12-07 PROBLEM — E66.01 OBESITY, CLASS III, BMI 40-49.9 (MORBID OBESITY): Status: ACTIVE | Noted: 2021-06-29

## 2023-12-07 PROBLEM — G47.33 OBSTRUCTIVE SLEEP APNEA, ADULT: Status: ACTIVE | Noted: 2021-06-29

## 2023-12-07 PROBLEM — N20.0 RENAL CALCULUS, BILATERAL: Status: ACTIVE | Noted: 2017-11-21

## 2023-12-07 PROBLEM — Z79.01 ANTICOAGULATED: Status: ACTIVE | Noted: 2023-07-20

## 2023-12-07 PROBLEM — I25.10 CORONARY ARTERY DISEASE: Status: ACTIVE | Noted: 2021-07-11

## 2023-12-07 PROBLEM — Z87.891 FORMER SMOKER: Status: ACTIVE | Noted: 2018-10-25

## 2023-12-07 PROBLEM — I25.119 CORONARY ARTERY DISEASE WITH ANGINA PECTORIS: Status: ACTIVE | Noted: 2023-07-20

## 2023-12-07 PROBLEM — Z78.9 DIFFICULTY USING CONTINUOUS POSITIVE AIRWAY PRESSURE (CPAP) DEVICE: Status: ACTIVE | Noted: 2023-07-20

## 2023-12-07 PROBLEM — I10 ESSENTIAL HYPERTENSION: Status: ACTIVE | Noted: 2017-11-21

## 2023-12-07 PROBLEM — Z95.5 HISTORY OF HEART ARTERY STENT: Status: ACTIVE | Noted: 2021-06-15

## 2023-12-07 PROBLEM — M48.061 LUMBAR STENOSIS: Status: ACTIVE | Noted: 2023-07-20

## 2023-12-07 PROBLEM — Z95.1 S/P CABG (CORONARY ARTERY BYPASS GRAFT): Status: ACTIVE | Noted: 2019-07-09

## 2023-12-07 PROBLEM — E78.5 HYPERLIPIDEMIA: Status: ACTIVE | Noted: 2023-07-20

## 2023-12-07 PROBLEM — J43.9 MILD EMPHYSEMA: Status: ACTIVE | Noted: 2017-11-21

## 2023-12-07 PROBLEM — R05.8 PRODUCTIVE COUGH: Status: ACTIVE | Noted: 2023-10-31

## 2023-12-07 PROBLEM — J34.89 STUFFY AND RUNNY NOSE: Status: ACTIVE | Noted: 2023-12-04

## 2023-12-07 PROBLEM — R10.13 DYSPEPSIA: Status: ACTIVE | Noted: 2020-12-15

## 2023-12-07 PROBLEM — R73.03 PREDIABETES: Status: ACTIVE | Noted: 2023-07-20

## 2023-12-09 ENCOUNTER — TRANSCRIPTION ENCOUNTER (OUTPATIENT)
Age: 78
End: 2023-12-09

## 2023-12-09 LAB
25(OH)D3 SERPL-MCNC: 18 NG/ML
ALBUMIN SERPL ELPH-MCNC: 4 G/DL
ALP BLD-CCNC: 93 U/L
ALT SERPL-CCNC: 15 U/L
ANION GAP SERPL CALC-SCNC: 13 MMOL/L
AST SERPL-CCNC: 19 U/L
BASOPHILS # BLD AUTO: 0.01 K/UL
BASOPHILS NFR BLD AUTO: 0.2 %
BILIRUB SERPL-MCNC: 0.4 MG/DL
BUN SERPL-MCNC: 16 MG/DL
CALCIUM SERPL-MCNC: 8.9 MG/DL
CHLORIDE SERPL-SCNC: 104 MMOL/L
CHOLEST SERPL-MCNC: 148 MG/DL
CO2 SERPL-SCNC: 25 MMOL/L
CREAT SERPL-MCNC: 0.85 MG/DL
EGFR: 70 ML/MIN/1.73M2
EOSINOPHIL # BLD AUTO: 0.05 K/UL
EOSINOPHIL NFR BLD AUTO: 1.2 %
ESTIMATED AVERAGE GLUCOSE: 120 MG/DL
GLUCOSE SERPL-MCNC: 101 MG/DL
HBA1C MFR BLD HPLC: 5.8 %
HCT VFR BLD CALC: 35.1 %
HDLC SERPL-MCNC: 47 MG/DL
HGB BLD-MCNC: 11.2 G/DL
IMM GRANULOCYTES NFR BLD AUTO: 0.5 %
LDLC SERPL CALC-MCNC: 64 MG/DL
LYMPHOCYTES # BLD AUTO: 1.39 K/UL
LYMPHOCYTES NFR BLD AUTO: 32.9 %
MAN DIFF?: NORMAL
MCHC RBC-ENTMCNC: 31.9 GM/DL
MCHC RBC-ENTMCNC: 32.7 PG
MCV RBC AUTO: 102.3 FL
MONOCYTES # BLD AUTO: 0.55 K/UL
MONOCYTES NFR BLD AUTO: 13 %
NEUTROPHILS # BLD AUTO: 2.2 K/UL
NEUTROPHILS NFR BLD AUTO: 52.2 %
NONHDLC SERPL-MCNC: 101 MG/DL
PLATELET # BLD AUTO: 296 K/UL
POTASSIUM SERPL-SCNC: 4.8 MMOL/L
PROT SERPL-MCNC: 7.4 G/DL
RBC # BLD: 3.43 M/UL
RBC # FLD: 16 %
SODIUM SERPL-SCNC: 142 MMOL/L
TRIGL SERPL-MCNC: 231 MG/DL
WBC # FLD AUTO: 4.22 K/UL

## 2023-12-09 RX ORDER — ERGOCALCIFEROL 1.25 MG/1
1.25 MG CAPSULE, LIQUID FILLED ORAL
Qty: 5 | Refills: 5 | Status: ACTIVE | COMMUNITY
Start: 2023-07-22 | End: 1900-01-01

## 2023-12-28 ENCOUNTER — EMERGENCY (EMERGENCY)
Facility: HOSPITAL | Age: 78
LOS: 1 days | Discharge: HOME | End: 2023-12-28
Attending: EMERGENCY MEDICINE
Payer: MEDICARE

## 2023-12-28 VITALS
OXYGEN SATURATION: 98 % | HEART RATE: 89 BPM | WEIGHT: 257.06 LBS | DIASTOLIC BLOOD PRESSURE: 82 MMHG | RESPIRATION RATE: 20 BRPM | HEIGHT: 63 IN | TEMPERATURE: 98 F | SYSTOLIC BLOOD PRESSURE: 165 MMHG

## 2023-12-28 VITALS
SYSTOLIC BLOOD PRESSURE: 137 MMHG | TEMPERATURE: 98 F | RESPIRATION RATE: 17 BRPM | DIASTOLIC BLOOD PRESSURE: 70 MMHG | OXYGEN SATURATION: 95 % | HEART RATE: 76 BPM

## 2023-12-28 DIAGNOSIS — Z98.890 OTHER SPECIFIED POSTPROCEDURAL STATES: Chronic | ICD-10-CM

## 2023-12-28 DIAGNOSIS — Z90.49 ACQUIRED ABSENCE OF OTHER SPECIFIED PARTS OF DIGESTIVE TRACT: Chronic | ICD-10-CM

## 2023-12-28 DIAGNOSIS — N81.4 UTEROVAGINAL PROLAPSE, UNSPECIFIED: Chronic | ICD-10-CM

## 2023-12-28 DIAGNOSIS — Z90.710 ACQUIRED ABSENCE OF BOTH CERVIX AND UTERUS: Chronic | ICD-10-CM

## 2023-12-28 DIAGNOSIS — Z93.6 OTHER ARTIFICIAL OPENINGS OF URINARY TRACT STATUS: Chronic | ICD-10-CM

## 2023-12-28 DIAGNOSIS — Z95.1 PRESENCE OF AORTOCORONARY BYPASS GRAFT: Chronic | ICD-10-CM

## 2023-12-28 DIAGNOSIS — Z98.49 CATARACT EXTRACTION STATUS, UNSPECIFIED EYE: Chronic | ICD-10-CM

## 2023-12-28 LAB
ALBUMIN SERPL ELPH-MCNC: 3.6 G/DL — SIGNIFICANT CHANGE UP (ref 3.3–5)
ALBUMIN SERPL ELPH-MCNC: 3.6 G/DL — SIGNIFICANT CHANGE UP (ref 3.3–5)
ALP SERPL-CCNC: 75 U/L — SIGNIFICANT CHANGE UP (ref 40–120)
ALP SERPL-CCNC: 75 U/L — SIGNIFICANT CHANGE UP (ref 40–120)
ALT FLD-CCNC: 11 U/L — SIGNIFICANT CHANGE UP (ref 10–45)
ALT FLD-CCNC: 11 U/L — SIGNIFICANT CHANGE UP (ref 10–45)
ANION GAP SERPL CALC-SCNC: 10 MMOL/L — SIGNIFICANT CHANGE UP (ref 5–17)
ANION GAP SERPL CALC-SCNC: 10 MMOL/L — SIGNIFICANT CHANGE UP (ref 5–17)
APPEARANCE UR: ABNORMAL
APPEARANCE UR: ABNORMAL
APTT BLD: 28.2 SEC — SIGNIFICANT CHANGE UP (ref 24.5–35.6)
APTT BLD: 28.2 SEC — SIGNIFICANT CHANGE UP (ref 24.5–35.6)
AST SERPL-CCNC: 15 U/L — SIGNIFICANT CHANGE UP (ref 10–40)
AST SERPL-CCNC: 15 U/L — SIGNIFICANT CHANGE UP (ref 10–40)
BACTERIA # UR AUTO: ABNORMAL /HPF
BACTERIA # UR AUTO: ABNORMAL /HPF
BASOPHILS # BLD AUTO: 0.02 K/UL — SIGNIFICANT CHANGE UP (ref 0–0.2)
BASOPHILS # BLD AUTO: 0.02 K/UL — SIGNIFICANT CHANGE UP (ref 0–0.2)
BASOPHILS NFR BLD AUTO: 0.2 % — SIGNIFICANT CHANGE UP (ref 0–2)
BASOPHILS NFR BLD AUTO: 0.2 % — SIGNIFICANT CHANGE UP (ref 0–2)
BILIRUB SERPL-MCNC: 0.4 MG/DL — SIGNIFICANT CHANGE UP (ref 0.2–1.2)
BILIRUB SERPL-MCNC: 0.4 MG/DL — SIGNIFICANT CHANGE UP (ref 0.2–1.2)
BILIRUB UR-MCNC: NEGATIVE — SIGNIFICANT CHANGE UP
BILIRUB UR-MCNC: NEGATIVE — SIGNIFICANT CHANGE UP
BUN SERPL-MCNC: 19 MG/DL — SIGNIFICANT CHANGE UP (ref 7–23)
BUN SERPL-MCNC: 19 MG/DL — SIGNIFICANT CHANGE UP (ref 7–23)
CALCIUM SERPL-MCNC: 10.1 MG/DL — SIGNIFICANT CHANGE UP (ref 8.4–10.5)
CALCIUM SERPL-MCNC: 10.1 MG/DL — SIGNIFICANT CHANGE UP (ref 8.4–10.5)
CAST: 5 /LPF — HIGH (ref 0–4)
CAST: 5 /LPF — HIGH (ref 0–4)
CHLORIDE SERPL-SCNC: 103 MMOL/L — SIGNIFICANT CHANGE UP (ref 96–108)
CHLORIDE SERPL-SCNC: 103 MMOL/L — SIGNIFICANT CHANGE UP (ref 96–108)
CO2 SERPL-SCNC: 26 MMOL/L — SIGNIFICANT CHANGE UP (ref 22–31)
CO2 SERPL-SCNC: 26 MMOL/L — SIGNIFICANT CHANGE UP (ref 22–31)
COLOR SPEC: YELLOW — SIGNIFICANT CHANGE UP
COLOR SPEC: YELLOW — SIGNIFICANT CHANGE UP
CREAT SERPL-MCNC: 0.96 MG/DL — SIGNIFICANT CHANGE UP (ref 0.5–1.3)
CREAT SERPL-MCNC: 0.96 MG/DL — SIGNIFICANT CHANGE UP (ref 0.5–1.3)
DIFF PNL FLD: ABNORMAL
DIFF PNL FLD: ABNORMAL
EGFR: 60 ML/MIN/1.73M2 — SIGNIFICANT CHANGE UP
EGFR: 60 ML/MIN/1.73M2 — SIGNIFICANT CHANGE UP
EOSINOPHIL # BLD AUTO: 0.06 K/UL — SIGNIFICANT CHANGE UP (ref 0–0.5)
EOSINOPHIL # BLD AUTO: 0.06 K/UL — SIGNIFICANT CHANGE UP (ref 0–0.5)
EOSINOPHIL NFR BLD AUTO: 0.7 % — SIGNIFICANT CHANGE UP (ref 0–6)
EOSINOPHIL NFR BLD AUTO: 0.7 % — SIGNIFICANT CHANGE UP (ref 0–6)
GLUCOSE SERPL-MCNC: 120 MG/DL — HIGH (ref 70–99)
GLUCOSE SERPL-MCNC: 120 MG/DL — HIGH (ref 70–99)
GLUCOSE UR QL: NEGATIVE MG/DL — SIGNIFICANT CHANGE UP
GLUCOSE UR QL: NEGATIVE MG/DL — SIGNIFICANT CHANGE UP
HCT VFR BLD CALC: 33.4 % — LOW (ref 34.5–45)
HCT VFR BLD CALC: 33.4 % — LOW (ref 34.5–45)
HGB BLD-MCNC: 10.8 G/DL — LOW (ref 11.5–15.5)
HGB BLD-MCNC: 10.8 G/DL — LOW (ref 11.5–15.5)
IMM GRANULOCYTES NFR BLD AUTO: 0.7 % — SIGNIFICANT CHANGE UP (ref 0–0.9)
IMM GRANULOCYTES NFR BLD AUTO: 0.7 % — SIGNIFICANT CHANGE UP (ref 0–0.9)
INR BLD: 1.13 RATIO — SIGNIFICANT CHANGE UP (ref 0.85–1.18)
INR BLD: 1.13 RATIO — SIGNIFICANT CHANGE UP (ref 0.85–1.18)
KETONES UR-MCNC: NEGATIVE MG/DL — SIGNIFICANT CHANGE UP
KETONES UR-MCNC: NEGATIVE MG/DL — SIGNIFICANT CHANGE UP
LEUKOCYTE ESTERASE UR-ACNC: ABNORMAL
LEUKOCYTE ESTERASE UR-ACNC: ABNORMAL
LYMPHOCYTES # BLD AUTO: 1.14 K/UL — SIGNIFICANT CHANGE UP (ref 1–3.3)
LYMPHOCYTES # BLD AUTO: 1.14 K/UL — SIGNIFICANT CHANGE UP (ref 1–3.3)
LYMPHOCYTES # BLD AUTO: 13.1 % — SIGNIFICANT CHANGE UP (ref 13–44)
LYMPHOCYTES # BLD AUTO: 13.1 % — SIGNIFICANT CHANGE UP (ref 13–44)
MCHC RBC-ENTMCNC: 32.2 PG — SIGNIFICANT CHANGE UP (ref 27–34)
MCHC RBC-ENTMCNC: 32.2 PG — SIGNIFICANT CHANGE UP (ref 27–34)
MCHC RBC-ENTMCNC: 32.3 GM/DL — SIGNIFICANT CHANGE UP (ref 32–36)
MCHC RBC-ENTMCNC: 32.3 GM/DL — SIGNIFICANT CHANGE UP (ref 32–36)
MCV RBC AUTO: 99.7 FL — SIGNIFICANT CHANGE UP (ref 80–100)
MCV RBC AUTO: 99.7 FL — SIGNIFICANT CHANGE UP (ref 80–100)
MONOCYTES # BLD AUTO: 0.69 K/UL — SIGNIFICANT CHANGE UP (ref 0–0.9)
MONOCYTES # BLD AUTO: 0.69 K/UL — SIGNIFICANT CHANGE UP (ref 0–0.9)
MONOCYTES NFR BLD AUTO: 7.9 % — SIGNIFICANT CHANGE UP (ref 2–14)
MONOCYTES NFR BLD AUTO: 7.9 % — SIGNIFICANT CHANGE UP (ref 2–14)
NEUTROPHILS # BLD AUTO: 6.72 K/UL — SIGNIFICANT CHANGE UP (ref 1.8–7.4)
NEUTROPHILS # BLD AUTO: 6.72 K/UL — SIGNIFICANT CHANGE UP (ref 1.8–7.4)
NEUTROPHILS NFR BLD AUTO: 77.4 % — HIGH (ref 43–77)
NEUTROPHILS NFR BLD AUTO: 77.4 % — HIGH (ref 43–77)
NITRITE UR-MCNC: NEGATIVE — SIGNIFICANT CHANGE UP
NITRITE UR-MCNC: NEGATIVE — SIGNIFICANT CHANGE UP
NRBC # BLD: 0 /100 WBCS — SIGNIFICANT CHANGE UP (ref 0–0)
NRBC # BLD: 0 /100 WBCS — SIGNIFICANT CHANGE UP (ref 0–0)
PH UR: 5 — SIGNIFICANT CHANGE UP (ref 5–8)
PH UR: 5 — SIGNIFICANT CHANGE UP (ref 5–8)
PLATELET # BLD AUTO: 322 K/UL — SIGNIFICANT CHANGE UP (ref 150–400)
PLATELET # BLD AUTO: 322 K/UL — SIGNIFICANT CHANGE UP (ref 150–400)
POTASSIUM SERPL-MCNC: 4.6 MMOL/L — SIGNIFICANT CHANGE UP (ref 3.5–5.3)
POTASSIUM SERPL-MCNC: 4.6 MMOL/L — SIGNIFICANT CHANGE UP (ref 3.5–5.3)
POTASSIUM SERPL-SCNC: 4.6 MMOL/L — SIGNIFICANT CHANGE UP (ref 3.5–5.3)
POTASSIUM SERPL-SCNC: 4.6 MMOL/L — SIGNIFICANT CHANGE UP (ref 3.5–5.3)
PROT SERPL-MCNC: 8 G/DL — SIGNIFICANT CHANGE UP (ref 6–8.3)
PROT SERPL-MCNC: 8 G/DL — SIGNIFICANT CHANGE UP (ref 6–8.3)
PROT UR-MCNC: SIGNIFICANT CHANGE UP MG/DL
PROT UR-MCNC: SIGNIFICANT CHANGE UP MG/DL
PROTHROM AB SERPL-ACNC: 11.8 SEC — SIGNIFICANT CHANGE UP (ref 9.5–13)
PROTHROM AB SERPL-ACNC: 11.8 SEC — SIGNIFICANT CHANGE UP (ref 9.5–13)
RBC # BLD: 3.35 M/UL — LOW (ref 3.8–5.2)
RBC # BLD: 3.35 M/UL — LOW (ref 3.8–5.2)
RBC # FLD: 15.9 % — HIGH (ref 10.3–14.5)
RBC # FLD: 15.9 % — HIGH (ref 10.3–14.5)
RBC CASTS # UR COMP ASSIST: 19 /HPF — HIGH (ref 0–4)
RBC CASTS # UR COMP ASSIST: 19 /HPF — HIGH (ref 0–4)
SODIUM SERPL-SCNC: 139 MMOL/L — SIGNIFICANT CHANGE UP (ref 135–145)
SODIUM SERPL-SCNC: 139 MMOL/L — SIGNIFICANT CHANGE UP (ref 135–145)
SP GR SPEC: 1.02 — SIGNIFICANT CHANGE UP (ref 1–1.03)
SP GR SPEC: 1.02 — SIGNIFICANT CHANGE UP (ref 1–1.03)
SQUAMOUS # UR AUTO: 2 /HPF — SIGNIFICANT CHANGE UP (ref 0–5)
SQUAMOUS # UR AUTO: 2 /HPF — SIGNIFICANT CHANGE UP (ref 0–5)
UROBILINOGEN FLD QL: 0.2 MG/DL — SIGNIFICANT CHANGE UP (ref 0.2–1)
UROBILINOGEN FLD QL: 0.2 MG/DL — SIGNIFICANT CHANGE UP (ref 0.2–1)
WBC # BLD: 8.69 K/UL — SIGNIFICANT CHANGE UP (ref 3.8–10.5)
WBC # BLD: 8.69 K/UL — SIGNIFICANT CHANGE UP (ref 3.8–10.5)
WBC # FLD AUTO: 8.69 K/UL — SIGNIFICANT CHANGE UP (ref 3.8–10.5)
WBC # FLD AUTO: 8.69 K/UL — SIGNIFICANT CHANGE UP (ref 3.8–10.5)
WBC UR QL: 204 /HPF — HIGH (ref 0–5)
WBC UR QL: 204 /HPF — HIGH (ref 0–5)

## 2023-12-28 PROCEDURE — 99284 EMERGENCY DEPT VISIT MOD MDM: CPT | Mod: 25

## 2023-12-28 PROCEDURE — 96374 THER/PROPH/DIAG INJ IV PUSH: CPT

## 2023-12-28 PROCEDURE — 74176 CT ABD & PELVIS W/O CONTRAST: CPT | Mod: MH

## 2023-12-28 PROCEDURE — 80053 COMPREHEN METABOLIC PANEL: CPT

## 2023-12-28 PROCEDURE — 99284 EMERGENCY DEPT VISIT MOD MDM: CPT

## 2023-12-28 PROCEDURE — 85025 COMPLETE CBC W/AUTO DIFF WBC: CPT

## 2023-12-28 PROCEDURE — 87077 CULTURE AEROBIC IDENTIFY: CPT

## 2023-12-28 PROCEDURE — 81001 URINALYSIS AUTO W/SCOPE: CPT

## 2023-12-28 PROCEDURE — 85730 THROMBOPLASTIN TIME PARTIAL: CPT

## 2023-12-28 PROCEDURE — 87086 URINE CULTURE/COLONY COUNT: CPT

## 2023-12-28 PROCEDURE — 85610 PROTHROMBIN TIME: CPT

## 2023-12-28 PROCEDURE — 74176 CT ABD & PELVIS W/O CONTRAST: CPT | Mod: 26,MH

## 2023-12-28 PROCEDURE — 87186 SC STD MICRODIL/AGAR DIL: CPT

## 2023-12-28 RX ORDER — CEFTRIAXONE 500 MG/1
1000 INJECTION, POWDER, FOR SOLUTION INTRAMUSCULAR; INTRAVENOUS ONCE
Refills: 0 | Status: COMPLETED | OUTPATIENT
Start: 2023-12-28 | End: 2023-12-28

## 2023-12-28 RX ORDER — CEFDINIR 250 MG/5ML
1 POWDER, FOR SUSPENSION ORAL
Qty: 14 | Refills: 0
Start: 2023-12-28 | End: 2024-01-03

## 2023-12-28 RX ORDER — ONDANSETRON 8 MG/1
4 TABLET, FILM COATED ORAL ONCE
Refills: 0 | Status: COMPLETED | OUTPATIENT
Start: 2023-12-28 | End: 2023-12-28

## 2023-12-28 RX ORDER — IBUPROFEN 200 MG
600 TABLET ORAL ONCE
Refills: 0 | Status: COMPLETED | OUTPATIENT
Start: 2023-12-28 | End: 2023-12-28

## 2023-12-28 RX ORDER — ACETAMINOPHEN 500 MG
975 TABLET ORAL ONCE
Refills: 0 | Status: COMPLETED | OUTPATIENT
Start: 2023-12-28 | End: 2023-12-28

## 2023-12-28 RX ADMIN — Medication 975 MILLIGRAM(S): at 15:41

## 2023-12-28 RX ADMIN — CEFTRIAXONE 100 MILLIGRAM(S): 500 INJECTION, POWDER, FOR SOLUTION INTRAMUSCULAR; INTRAVENOUS at 20:36

## 2023-12-28 RX ADMIN — ONDANSETRON 4 MILLIGRAM(S): 8 TABLET, FILM COATED ORAL at 15:41

## 2023-12-28 NOTE — ED PROVIDER NOTE - NSFOLLOWUPINSTRUCTIONS_ED_ALL_ED_FT
Urinary Tract Infection    A urinary tract infection (UTI) is an infection of any part of the urinary tract, which includes the kidneys, ureters, bladder, and urethra. Risk factors include ignoring your need to urinate, wiping back to front if female, being an uncircumcised male, and having diabetes or a weak immune system. Symptoms include frequent urination, pain or burning with urination, foul smelling urine, cloudy urine, pain in the lower abdomen, blood in the urine, and fever. If you were prescribed an antibiotic medicine, take it as told by your health care provider. Do not stop taking the antibiotic even if you start to feel better.    SEEK IMMEDIATE MEDICAL CARE IF YOU HAVE ANY OF THE FOLLOWING SYMPTOMS: severe back or abdominal pain, fever, inability to keep fluids or medicine down, dizziness/lightheadedness, or a change in mental status.    Please follow up with a urologist    Kidney Stones    Kidney stones (urolithiasis) are crystal deposits that form inside your kidneys. Pain is caused by the stone moving through the urinary tract, causing spasms of the ureter. Drink enough water and fluids to keep your urine clear or pale yellow. This will help you to pass the stone or stone fragments. If provided a strainer, strain all urine and keep all particulate matter and stones for a follow up appointment with a urologist.    SEEK IMMEDIATE MEDICAL CARE IF YOU HAVE ANY OF THE FOLLOWING SYMPTOMS: pain not controlled with medication, fever/chills, worsening vomiting, inability to urinate, or dizziness/lightheadedness.    You have been diagnosed with a kidney stone. To control the pain, you should take Ibuprofen 400 mg along with Tylenol 650mg every 6 hours. These are both over the counter medications that you can  at your local pharmacy without a prescription. We also sent a stronger pain medication to your pharmacy that you should only take when you are still having pain despite trying Tylenol and Ibuprofen. If you are still having severe pain in 48 hours you should return to the emergency room or a urologist if able. If you began having fever, uncontrollable nausea and vomiting then you also need to come back to the ED. Please follow up with a urologist - you will be given a referral - someone will call you to arrange an appointment if you cannot follow up with your original urologist.     Urinary Tract Infection    A urinary tract infection (UTI) is an infection of any part of the urinary tract, which includes the kidneys, ureters, bladder, and urethra. Risk factors include ignoring your need to urinate, wiping back to front if female, being an uncircumcised male, and having diabetes or a weak immune system. Symptoms include frequent urination, pain or burning with urination, foul smelling urine, cloudy urine, pain in the lower abdomen, blood in the urine, and fever. If you were prescribed an antibiotic medicine, take it as told by your health care provider. Do not stop taking the antibiotic even if you start to feel better.    SEEK IMMEDIATE MEDICAL CARE IF YOU HAVE ANY OF THE FOLLOWING SYMPTOMS: severe back or abdominal pain, fever, inability to keep fluids or medicine down, dizziness/lightheadedness, or a change in mental status    Kidney Stones    Kidney stones (urolithiasis) are crystal deposits that form inside your kidneys. Pain is caused by the stone moving through the urinary tract, causing spasms of the ureter. Drink enough water and fluids to keep your urine clear or pale yellow. This will help you to pass the stone or stone fragments. If provided a strainer, strain all urine and keep all particulate matter and stones for a follow up appointment with a urologist.    SEEK IMMEDIATE MEDICAL CARE IF YOU HAVE ANY OF THE FOLLOWING SYMPTOMS: pain not controlled with medication, fever/chills, worsening vomiting, inability to urinate, or dizziness/lightheadedness.    You have been diagnosed with a kidney stone. To control the pain, you should take Ibuprofen 400 mg along with Tylenol 650mg every 6 hours. These are both over the counter medications that you can  at your local pharmacy without a prescription. We also sent a stronger pain medication to your pharmacy that you should only take when you are still having pain despite trying Tylenol and Ibuprofen. If you are still having severe pain in 48 hours you should return to the emergency room or a urologist if able. If you began having fever, uncontrollable nausea and vomiting then you also need to come back to the ED.

## 2023-12-28 NOTE — ED ADULT TRIAGE NOTE - CHIEF COMPLAINT QUOTE
severe left side flank pain x1 day, feels similar to kidney stones. pmh of kidney stone w/nephrostomy tube placement

## 2023-12-28 NOTE — ED PROVIDER NOTE - PHYSICAL EXAMINATION
Virgilio Alejandro DO (PGY2)   Physical Exam:    Gen: NAD, AOx3  Head: NCAT  HEENT: EOMI, PEERLA  Lung: CTAB, no respiratory distress, no wheezes/rhonchi/rales B/L  CV: RRR, no murmurs, rubs or gallops  Abd: soft, NT, ND, no guarding, no rigidity, no rebound tenderness, no CVA tenderness   MSK: no visible deformities, ROM normal in UE/LE, no back pain  Neuro: No focal sensory or motor deficits. Sensation intact to light touch all extremities.  Skin: Warm, well perfused, no rash, no leg swelling  Psych: normal affect, calm

## 2023-12-28 NOTE — ED PROVIDER NOTE - PATIENT PORTAL LINK FT
You can access the FollowMyHealth Patient Portal offered by [facility] by registering at the following website: [email or web address]. By joining [employer]’s FollowMyHealth portal, you will also be able to view your health information using other applications (apps) compatible with our system. You can access the FollowMyHealth Patient Portal offered by [facility] by registering at the following website: [email or web address]. By joining [employer]’s FollowMyHealth portal, you will also be able to view your health information using other applications (apps) compatible with our system.

## 2023-12-28 NOTE — ED ADULT TRIAGE NOTE - SPO2 (%)
[facility] Care Coordination Contact      Good morning Alexandra,    Welcome to the team. Sorry I am late getting this to you, been busy.    Below is my contact information. If you have any other further questions please feel free to let me know.    Kathie GALLEGOS [facility] Manager  [address] [place] [address]    Office/Fax: [phone]    I will also give you my supervisors information also.    Emanuel GALLEGOS Designated Coordinator for  2  [address] [place] [address]    Phone: [phone]  Fax: [phone]      Kathie  --  Kathie Sandoval  [facility] Manager  [address] [place] [address]  Phone/Fax: [phone]       98

## 2023-12-28 NOTE — ED PROVIDER NOTE - CLINICAL SUMMARY MEDICAL DECISION MAKING FREE TEXT BOX
76-year-old female history of bronchitis, emphysema, sleep apnea, CAD, heart failure, lumpectomy, nephrolithiasis with history of left percutaneous nephrolithotomy and stent in August 2021, presenting with left-sided flank pain associated nausea vomiting.  Denies any fevers, chills, hematuria, dysuria, chest pain, shortness of breath, diarrhea or constipation.    Vital signs stable, afebrile, not hypoxic. Plan for basic labs, UA, CT  Differential diagnosis includes but not limited to UTI vs. pyelo vs. renal colic 76-year-old female history of bronchitis, emphysema, sleep apnea, CAD, heart failure, lumpectomy, nephrolithiasis with history of left percutaneous nephrolithotomy and stent in August 2021, presenting with left-sided flank pain associated nausea vomiting.  Denies any fevers, chills, hematuria, dysuria, chest pain, shortness of breath, diarrhea or constipation.    Vital signs stable, afebrile, not hypoxic. Plan for basic labs, UA, CT  Differential diagnosis includes but not limited to UTI vs. pyelo vs. renal colic    ESTHELA Vega MD: Agree with resident/ACP MDM, assessment and plan as above.

## 2023-12-28 NOTE — ED PROVIDER NOTE - OBJECTIVE STATEMENT
76-year-old female history of bronchitis, emphysema, sleep apnea, CAD, heart failure, lumpectomy, nephrolithiasis with history of left percutaneous nephrolithotomy and stent in August 2021, presenting with left-sided flank pain associated nausea vomiting.  Denies any fevers, chills, hematuria, dysuria, chest pain, shortness of breath, diarrhea or constipation.      Urologist - Dr. Berman

## 2023-12-28 NOTE — ED PROVIDER NOTE - RAPID ASSESSMENT
76 F with PMhx of bronchitis, emphysema, TYLOR, CAD (coronary artery disease), heart failure lumpectomy,nephrolithiasis with recent Left percutaneous nephrolithotomy and stent in 8/21,Left ureteral stent placement presenting with 2 hours of severe sudden onset left sided flank pain with associated nausea and vomiting. no fever/chills. no hematuria/dysuria. reports she is certain this is renal colic. of note patient off her anticoagulation for a dental procedure.  Uro - Dr. Berman / [place]

## 2023-12-28 NOTE — ED ADULT NURSE NOTE - OBJECTIVE STATEMENT
79 y/o F A&OX4 w/ PMH of kidney stones, triple bypass, CAD, HTN presents to ED complaining of flank pain. Pt states pain started today and is experiencing left flank pain. Pt states she has had kidney stones before and it feels familiar. Pt endorses intermittent nausea. Pt denies vomiting, diarrhea, fever, chills, urinary symptoms, hematuria. Pt VSS at this time.

## 2023-12-28 NOTE — ED PROVIDER NOTE - PROGRESS NOTE DETAILS
Resident: Virgilio Alejandro, PGY2 – Pt was re-evaluated at bedside, VSS, feeling better overall. Results were discussed with patient as well as return precautions and follow up plan with PCP and/or specialist. Time was taken to answer any questions that the patient had before providing them with discharge paperwork. Discussed f/u with urology and continued abx for UTI. Discussed pain control at home for kidney stones.

## 2023-12-31 LAB
-  AMOXICILLIN/CLAVULANIC ACID: SIGNIFICANT CHANGE UP
-  AMOXICILLIN/CLAVULANIC ACID: SIGNIFICANT CHANGE UP
-  AMPICILLIN/SULBACTAM: SIGNIFICANT CHANGE UP
-  AMPICILLIN/SULBACTAM: SIGNIFICANT CHANGE UP
-  AMPICILLIN: SIGNIFICANT CHANGE UP
-  AMPICILLIN: SIGNIFICANT CHANGE UP
-  AZTREONAM: SIGNIFICANT CHANGE UP
-  AZTREONAM: SIGNIFICANT CHANGE UP
-  CEFAZOLIN: SIGNIFICANT CHANGE UP
-  CEFAZOLIN: SIGNIFICANT CHANGE UP
-  CEFEPIME: SIGNIFICANT CHANGE UP
-  CEFEPIME: SIGNIFICANT CHANGE UP
-  CEFOXITIN: SIGNIFICANT CHANGE UP
-  CEFOXITIN: SIGNIFICANT CHANGE UP
-  CEFTRIAXONE: SIGNIFICANT CHANGE UP
-  CEFTRIAXONE: SIGNIFICANT CHANGE UP
-  CEFUROXIME: SIGNIFICANT CHANGE UP
-  CEFUROXIME: SIGNIFICANT CHANGE UP
-  CIPROFLOXACIN: SIGNIFICANT CHANGE UP
-  CIPROFLOXACIN: SIGNIFICANT CHANGE UP
-  ERTAPENEM: SIGNIFICANT CHANGE UP
-  ERTAPENEM: SIGNIFICANT CHANGE UP
-  GENTAMICIN: SIGNIFICANT CHANGE UP
-  GENTAMICIN: SIGNIFICANT CHANGE UP
-  IMIPENEM: SIGNIFICANT CHANGE UP
-  IMIPENEM: SIGNIFICANT CHANGE UP
-  LEVOFLOXACIN: SIGNIFICANT CHANGE UP
-  LEVOFLOXACIN: SIGNIFICANT CHANGE UP
-  MEROPENEM: SIGNIFICANT CHANGE UP
-  MEROPENEM: SIGNIFICANT CHANGE UP
-  NITROFURANTOIN: SIGNIFICANT CHANGE UP
-  NITROFURANTOIN: SIGNIFICANT CHANGE UP
-  PIPERACILLIN/TAZOBACTAM: SIGNIFICANT CHANGE UP
-  PIPERACILLIN/TAZOBACTAM: SIGNIFICANT CHANGE UP
-  TOBRAMYCIN: SIGNIFICANT CHANGE UP
-  TOBRAMYCIN: SIGNIFICANT CHANGE UP
-  TRIMETHOPRIM/SULFAMETHOXAZOLE: SIGNIFICANT CHANGE UP
-  TRIMETHOPRIM/SULFAMETHOXAZOLE: SIGNIFICANT CHANGE UP
CULTURE RESULTS: ABNORMAL
CULTURE RESULTS: ABNORMAL
METHOD TYPE: SIGNIFICANT CHANGE UP
METHOD TYPE: SIGNIFICANT CHANGE UP
ORGANISM # SPEC MICROSCOPIC CNT: ABNORMAL
SPECIMEN SOURCE: SIGNIFICANT CHANGE UP
SPECIMEN SOURCE: SIGNIFICANT CHANGE UP

## 2024-01-08 NOTE — ED ADULT NURSE NOTE - PRO INTERPRETER NEED 2
I have personally seen and examined the patient. I have collaborated with and supervised the
English

## 2024-01-24 ENCOUNTER — APPOINTMENT (OUTPATIENT)
Dept: PULMONOLOGY | Facility: CLINIC | Age: 79
End: 2024-01-24

## 2024-05-15 NOTE — ED PROVIDER NOTE - ENMT, MLM
Covering RN: Pt hypotensive 80/47 (58) R arm, and 95/62 (73) L arm. ACP Kashfi aware, rpt cbc to be sent and 500 cc LR bolus to be administered. No other complaints at this time, safety maintained.
MD Shultz aware of BP, levophed remains paused. Per MD, patient ok to go off tele at this time to CT. Patient taken to CT at this time.
lyles cath completed, 2 RNs present, sterile technique utilized, aseptic equipment, tolerated well, 200 mL drained immediately upon insertion, urine appeared yellow and cloudy
Airway patent

## 2024-05-22 NOTE — CONSULT NOTE ADULT - NS_IRCONSULTTYPE_GEN_ALL_CORE
Patient states Dr. Quiroga sent a prescription 05/21/24 for Hydrocodone acetaminophen 5-325mg 1 tablet every 6 hours as needed for pain   Non Face-to-Face

## 2024-06-18 NOTE — DISCUSSION/SUMMARY
[de-identified] : Discussed findings of today's exam and possible causes of patient's pain.  Educated patient on their most probable diagnosis of acute left knee and lower leg pain status post fall due to contusion and exacerbation of underlying moderate to early severe osteoarthritis.  Reviewed possible courses of treatment, and we collaboratively decided best course of treatment at this time will include conservative management.  Patient has no evidence of fracture on x-ray, she has no evidence of acute internal derangement, she does has exacerbation of underlying degenerative changes.  Patient cannot take oral NSAIDs due to anticoagulation therapy, she may continue take Tylenol as needed for pain, she may continue utilize lidocaine cream as needed for pain relief.  Patient will be started on a course of physical therapy to restore normal range of motion and strength as tolerated.  If patient has persisting pain may consider cortisone injection at that time.  Follow up as needed.  Patient appreciates and agrees with current plan.\par \par \par This note was generated using dragon medical dictation software.  A reasonable effort has been made for proofreading its contents, but typos may still remain.  If there are any questions or points of clarification needed please notify my office.\par 
REQUIRED- Click to run Sepsis Recognition Calculator

## 2024-09-19 NOTE — ED CDU PROVIDER INITIAL DAY NOTE - NS ED ATTENDING STATEMENT MOD
Sheath removed intact.  I have personally performed a face to face diagnostic evaluation on this patient. I have reviewed the ACP note and agree with the history, exam and plan of care, except as noted.

## 2024-12-16 NOTE — ED PROVIDER NOTE - OBJECTIVE STATEMENT
72 yo white female with H/O HTN, TYLOR, COPD and Renal Calculus well up until last Thursday at which time watery brown diarrhea began. Since that time she has had multiple watery brown BMs per day with associated weakness and fatigue. No fever or chills. No recent travel or antibiotic use. 16-Dec-2024 02:35

## 2025-01-30 ENCOUNTER — APPOINTMENT (OUTPATIENT)
Age: 80
End: 2025-01-30
Payer: MEDICARE

## 2025-01-30 VITALS — WEIGHT: 249 LBS | HEIGHT: 63 IN | BODY MASS INDEX: 44.12 KG/M2

## 2025-01-30 DIAGNOSIS — B35.1 TINEA UNGUIUM: ICD-10-CM

## 2025-01-30 DIAGNOSIS — M20.11 HALLUX VALGUS (ACQUIRED), RIGHT FOOT: ICD-10-CM

## 2025-01-30 DIAGNOSIS — M79.671 PAIN IN RIGHT FOOT: ICD-10-CM

## 2025-01-30 DIAGNOSIS — M20.12 HALLUX VALGUS (ACQUIRED), LEFT FOOT: ICD-10-CM

## 2025-01-30 DIAGNOSIS — M79.672 PAIN IN LEFT FOOT: ICD-10-CM

## 2025-01-30 DIAGNOSIS — I73.9 PERIPHERAL VASCULAR DISEASE, UNSPECIFIED: ICD-10-CM

## 2025-01-30 PROCEDURE — 11720 DEBRIDE NAIL 1-5: CPT

## 2025-01-30 PROCEDURE — 99203 OFFICE O/P NEW LOW 30 MIN: CPT | Mod: 25

## 2025-01-31 ENCOUNTER — NON-APPOINTMENT (OUTPATIENT)
Age: 80
End: 2025-01-31

## 2025-01-31 PROBLEM — I73.9 PVD (PERIPHERAL VASCULAR DISEASE): Status: ACTIVE | Noted: 2025-01-31

## 2025-01-31 PROBLEM — M20.12 HALLUX VALGUS OF LEFT FOOT: Status: ACTIVE | Noted: 2025-01-31

## 2025-01-31 PROBLEM — M20.11 HALLUX VALGUS OF RIGHT FOOT: Status: ACTIVE | Noted: 2025-01-31

## 2025-01-31 PROBLEM — M79.671 RIGHT FOOT PAIN: Status: ACTIVE | Noted: 2025-01-31

## 2025-01-31 PROBLEM — M79.672 LEFT FOOT PAIN: Status: ACTIVE | Noted: 2025-01-31

## 2025-01-31 PROBLEM — B35.1 ONYCHOMYCOSIS: Status: ACTIVE | Noted: 2025-01-31

## 2025-01-31 RX ORDER — FUROSEMIDE 20 MG/1
20 TABLET ORAL
Refills: 0 | Status: ACTIVE | COMMUNITY

## 2025-02-25 ENCOUNTER — APPOINTMENT (OUTPATIENT)
Age: 80
End: 2025-02-25
Payer: MEDICARE

## 2025-02-25 VITALS — HEIGHT: 63 IN | BODY MASS INDEX: 44.12 KG/M2 | WEIGHT: 249 LBS

## 2025-02-25 DIAGNOSIS — M79.674 PAIN IN RIGHT TOE(S): ICD-10-CM

## 2025-02-25 DIAGNOSIS — M79.675 PAIN IN LEFT TOE(S): ICD-10-CM

## 2025-02-25 DIAGNOSIS — L60.0 INGROWING NAIL: ICD-10-CM

## 2025-02-25 PROCEDURE — 99213 OFFICE O/P EST LOW 20 MIN: CPT

## 2025-02-26 PROBLEM — L60.0 INGROWN TOENAIL: Status: ACTIVE | Noted: 2025-02-26

## 2025-02-26 PROBLEM — M79.675 PAIN OF TOE OF LEFT FOOT: Status: ACTIVE | Noted: 2025-02-26

## 2025-02-26 PROBLEM — M79.674 PAIN OF TOE OF RIGHT FOOT: Status: ACTIVE | Noted: 2025-02-26

## 2025-04-30 NOTE — ED ADULT NURSE NOTE - NSFALLUNIVINTERV_ED_ALL_ED
Additional Notes: Ak recheck in a month Detail Level: Detailed Render Risk Assessment In Note?: no Bed/Stretcher in lowest position, wheels locked, appropriate side rails in place/Call bell, personal items and telephone in reach/Instruct patient to call for assistance before getting out of bed/chair/stretcher/Non-slip footwear applied when patient is off stretcher/[place] to call system/Physically safe environment - no spills, clutter or unnecessary equipment/Purposeful proactive rounding/Room/bathroom lighting operational, light cord in reach Bed/Stretcher in lowest position, wheels locked, appropriate side rails in place/Call bell, personal items and telephone in reach/Instruct patient to call for assistance before getting out of bed/chair/stretcher/Non-slip footwear applied when patient is off stretcher/[place] to call system/Physically safe environment - no spills, clutter or unnecessary equipment/Purposeful proactive rounding/Room/bathroom lighting operational, light cord in reach

## 2025-05-13 ENCOUNTER — APPOINTMENT (OUTPATIENT)
Age: 80
End: 2025-05-13
Payer: MEDICARE

## 2025-05-13 VITALS — HEIGHT: 63 IN | BODY MASS INDEX: 41.64 KG/M2 | WEIGHT: 235 LBS

## 2025-05-13 DIAGNOSIS — M79.672 PAIN IN LEFT FOOT: ICD-10-CM

## 2025-05-13 DIAGNOSIS — L84 CORNS AND CALLOSITIES: ICD-10-CM

## 2025-05-13 DIAGNOSIS — M20.12 HALLUX VALGUS (ACQUIRED), LEFT FOOT: ICD-10-CM

## 2025-05-13 DIAGNOSIS — M20.11 HALLUX VALGUS (ACQUIRED), RIGHT FOOT: ICD-10-CM

## 2025-05-13 DIAGNOSIS — B35.1 TINEA UNGUIUM: ICD-10-CM

## 2025-05-13 DIAGNOSIS — M79.671 PAIN IN RIGHT FOOT: ICD-10-CM

## 2025-05-13 PROCEDURE — 11721 DEBRIDE NAIL 6 OR MORE: CPT | Mod: 79

## 2025-05-13 PROCEDURE — 99213 OFFICE O/P EST LOW 20 MIN: CPT | Mod: 25

## 2025-05-13 PROCEDURE — 11055 PARING/CUTG B9 HYPRKER LES 1: CPT

## 2025-05-14 PROBLEM — L84 CALLUS: Status: ACTIVE | Noted: 2025-05-14

## 2025-08-19 ENCOUNTER — APPOINTMENT (OUTPATIENT)
Age: 80
End: 2025-08-19
Payer: MEDICARE

## 2025-08-19 ENCOUNTER — NON-APPOINTMENT (OUTPATIENT)
Age: 80
End: 2025-08-19

## 2025-08-19 VITALS — WEIGHT: 230 LBS | BODY MASS INDEX: 40.75 KG/M2 | HEIGHT: 63 IN

## 2025-08-19 DIAGNOSIS — I73.9 PERIPHERAL VASCULAR DISEASE, UNSPECIFIED: ICD-10-CM

## 2025-08-19 DIAGNOSIS — L84 CORNS AND CALLOSITIES: ICD-10-CM

## 2025-08-19 DIAGNOSIS — B35.1 TINEA UNGUIUM: ICD-10-CM

## 2025-08-19 PROCEDURE — 11721 DEBRIDE NAIL 6 OR MORE: CPT | Mod: 59

## 2025-08-19 PROCEDURE — 11055 PARING/CUTG B9 HYPRKER LES 1: CPT | Mod: Q8
